# Patient Record
Sex: FEMALE | Race: WHITE | NOT HISPANIC OR LATINO | Employment: UNEMPLOYED | URBAN - METROPOLITAN AREA
[De-identification: names, ages, dates, MRNs, and addresses within clinical notes are randomized per-mention and may not be internally consistent; named-entity substitution may affect disease eponyms.]

---

## 2017-05-02 DIAGNOSIS — Z00.00 ROUTINE GENERAL MEDICAL EXAMINATION AT A HEALTH CARE FACILITY: Primary | ICD-10-CM

## 2017-05-08 DIAGNOSIS — Z00.00 ROUTINE GENERAL MEDICAL EXAMINATION AT A HEALTH CARE FACILITY: Primary | ICD-10-CM

## 2017-06-05 ENCOUNTER — HOSPITAL ENCOUNTER (OUTPATIENT)
Dept: RADIOLOGY | Facility: HOSPITAL | Age: 57
Discharge: HOME | End: 2017-06-05
Attending: INTERNAL MEDICINE
Payer: COMMERCIAL

## 2017-06-05 ENCOUNTER — OFFICE VISIT (OUTPATIENT)
Dept: INTERNAL MEDICINE | Facility: CLINIC | Age: 57
End: 2017-06-05
Payer: COMMERCIAL

## 2017-06-05 ENCOUNTER — CLINICAL SUPPORT (OUTPATIENT)
Dept: INTERNAL MEDICINE | Facility: CLINIC | Age: 57
End: 2017-06-05
Attending: INTERNAL MEDICINE
Payer: COMMERCIAL

## 2017-06-05 ENCOUNTER — HOSPITAL ENCOUNTER (OUTPATIENT)
Dept: CARDIOLOGY | Facility: CLINIC | Age: 57
Discharge: HOME | End: 2017-06-05
Payer: COMMERCIAL

## 2017-06-05 VITALS
HEIGHT: 63 IN | DIASTOLIC BLOOD PRESSURE: 75 MMHG | SYSTOLIC BLOOD PRESSURE: 120 MMHG | WEIGHT: 143.31 LBS | BODY MASS INDEX: 25.39 KG/M2

## 2017-06-05 DIAGNOSIS — Z00.00 ROUTINE GENERAL MEDICAL EXAMINATION AT A HEALTH CARE FACILITY: ICD-10-CM

## 2017-06-05 DIAGNOSIS — Z00.00 ANNUAL PHYSICAL EXAM: Primary | ICD-10-CM

## 2017-06-05 LAB — DIASTOLIC DYSFUNCTION: NO

## 2017-06-05 PROCEDURE — 99999 PR PBB SHADOW E&M-EST. PATIENT-LVL III: CPT | Mod: PBBFAC,,, | Performed by: INTERNAL MEDICINE

## 2017-06-05 PROCEDURE — 99396 PREV VISIT EST AGE 40-64: CPT | Mod: S$GLB,,, | Performed by: INTERNAL MEDICINE

## 2017-06-05 PROCEDURE — 71020 XR CHEST PA AND LATERAL: CPT | Mod: TC

## 2017-06-05 PROCEDURE — 71020 XR CHEST PA AND LATERAL: CPT | Mod: 26,,, | Performed by: RADIOLOGY

## 2017-06-05 PROCEDURE — 77067 SCR MAMMO BI INCL CAD: CPT | Mod: 26,,, | Performed by: RADIOLOGY

## 2017-06-05 PROCEDURE — 77067 SCR MAMMO BI INCL CAD: CPT | Mod: TC

## 2017-06-05 PROCEDURE — 93015 CV STRESS TEST SUPVJ I&R: CPT | Mod: S$GLB,,, | Performed by: INTERNAL MEDICINE

## 2017-06-05 RX ORDER — LEVOTHYROXINE SODIUM 137 UG/1
137 TABLET ORAL DAILY
Qty: 30 TABLET | Refills: 12
Start: 2017-06-05 | End: 2017-06-05

## 2017-06-05 RX ORDER — LEVOTHYROXINE SODIUM 125 UG/1
TABLET ORAL
Refills: 4 | COMMUNITY
Start: 2017-05-19 | End: 2020-07-06

## 2017-06-05 RX ORDER — VENLAFAXINE 50 MG/1
TABLET ORAL
Refills: 2 | COMMUNITY
Start: 2017-05-01 | End: 2023-10-02 | Stop reason: SDUPTHER

## 2017-06-05 RX ORDER — ERGOCALCIFEROL 1.25 MG/1
50000 CAPSULE ORAL
Refills: 2 | COMMUNITY
Start: 2017-04-15 | End: 2018-07-02

## 2017-06-05 NOTE — PROGRESS NOTES
Subjective:       Patient ID: Ranjana Kang is a 56 y.o. female.    Chief Complaint: Executive Health     PE    Recent sleeve surgery with 50 # weight loss.    Doing well, no complaints.  Labs done at [facility] a few months ago were acceptable with a cholesterol of 180, HDL 49, .  Glucose was 102.  Metabolic panel was acceptable.  A1c was 5.3.  TSH was suppressed at 0.04 with a T4 of 1.2; at that time, her thyroid was reduced to 125.  She will follow with her primary doctor soon regarding this.  CBC was acceptable.  Thiamine was 214.  Vitamin D was elevated at 98.  I have recommended that she reduce her vitamin D from 50,000 units weekly to 50,000 units monthly; she will discuss this with her PCP.    Patient Active Problem List:     Depression     Anxiety     Menopause syndrome     Glucose intolerance (impaired glucose tolerance)     Mixed hyperlipidemia     Acquired hypothyroidism     Family history of colon cancer: father in his 60s        Review of Systems   Constitutional: Negative for activity change, appetite change, chills, fatigue and fever.   HENT: Negative for sinus pressure and sore throat.    Eyes: Negative for visual disturbance.   Respiratory: Negative for cough, chest tightness, shortness of breath and wheezing.         No further KACY sx   Cardiovascular: Negative for chest pain, palpitations and leg swelling.   Gastrointestinal: Negative for abdominal distention, abdominal pain, anal bleeding, blood in stool, constipation, diarrhea, nausea and vomiting.   Endocrine: Negative for cold intolerance, heat intolerance, polydipsia, polyphagia and polyuria.        Thyroid overactive; meds adjusted now on 125   Genitourinary: Negative for dysuria, frequency, hematuria and vaginal bleeding.   Musculoskeletal: Negative for gait problem, joint swelling and myalgias.   Skin: Negative for rash.        Some hair loss   Neurological: Negative for dizziness, tremors, weakness, light-headedness and headaches.    Hematological: Negative for adenopathy. Does not bruise/bleed easily.   Psychiatric/Behavioral: Negative for confusion, hallucinations, sleep disturbance and suicidal ideas.       Objective:      Physical Exam   Constitutional: She is oriented to person, place, and time. She appears well-developed and well-nourished.   HENT:   Head: Normocephalic and atraumatic.   Right Ear: External ear normal.   Left Ear: External ear normal.   Nose: Nose normal.   Mouth/Throat: Oropharynx is clear and moist. No oropharyngeal exudate.   Eyes: Conjunctivae and EOM are normal. No scleral icterus.   Neck: Normal range of motion. Neck supple. No JVD present. No thyromegaly present.   Cardiovascular: Normal rate, regular rhythm, normal heart sounds and intact distal pulses.  Exam reveals no gallop.    No murmur heard.  Pulmonary/Chest: Effort normal and breath sounds normal. No respiratory distress. She has no wheezes. She exhibits no tenderness.   Abdominal: Soft. Bowel sounds are normal. She exhibits no distension and no mass. There is no tenderness. There is no rebound and no guarding.   Musculoskeletal: Normal range of motion. She exhibits no edema or tenderness.   Lymphadenopathy:     She has no cervical adenopathy.   Neurological: She is alert and oriented to person, place, and time. She displays normal reflexes. No cranial nerve deficit. Coordination normal.   Skin: Skin is warm. No rash noted. No erythema.   Psychiatric: She has a normal mood and affect. Her behavior is normal. Judgment and thought content normal.   Nursing note and vitals reviewed.      Assessment:       1. Annual physical exam    2. Hypothyroidism due to medication    3. Postmenopausal hormone replacement therapy        Plan:         Annual physical exam    Hypothyroidism due to medication  -     Continue on current regimen; will need repeat labs in the next 2-3 months; she will follow with her primary doctor regarding this    Menopause- off ERT    ETT  acceptable  Mammogram done today  Keep GYN follow up

## 2017-06-05 NOTE — LETTER
2017    Ranjana Kang  [address]  [place] [address]             [facility] - Internal Medicine  [address]  [facility] [address]  Phone: [phone]  Fax: [phone] Dear Mrs. Kang:    Thank you for allowing me to serve you and perform your Executive Health exam on 2017.  This letter will serve a brief summary of the history, physical findings, and laboratory/studies performed and recommendations at that time.    Reason for Visit: Executive Health Preventive Physical Examination    Past Medical History:   Diagnosis Date    Allergy     Anxiety     Depression     Family history of colon cancer: father in his 60s 2016    Glucose intolerance (impaired glucose tolerance) 2012    Headache     Hypothyroidism     Memory loss     Menopause syndrome     Mitral valve disorders 2012    Obesity     Other and unspecified hyperlipidemia     Sleep apnea: per sleep study 2015; CPAP at 11 cm recommended 2015    Special screening for malignant neoplasms, colon 2015    Thyroid disease        Past Surgical History:   Procedure Laterality Date    APPENDECTOMY  2015     SECTION, CLASSIC      FRACTURE SURGERY      HERNIA REPAIR      umbilical hernia    NOSE SURGERY         Family History   Problem Relation Age of Onset    Sleep apnea Mother     Cancer Mother      bladder sarcoma, smoking    Sleep apnea Father     Colon cancer Father     Depression Father      Bipolar    Cancer Father      colon    Depression Sister      suicide    No Known Problems Brother     No Known Problems Daughter     Melanoma Neg Hx     Skin cancer Neg Hx     Breast cancer Neg Hx     Ovarian cancer Neg Hx             Review of patient's allergies indicates:  No Known Allergies      Current Outpatient Prescriptions:     cetirizine (ZYRTEC) 10 mg Cap, Take by mouth., Disp: , Rfl:     sulfacetamide sodium-sulfur 10-5 % (w/w) Clsr, , Disp: , Rfl:  10    ergocalciferol (ERGOCALCIFEROL) 50,000 unit Cap, Take 50,000 Units by mouth every 30 days., Disp: , Rfl: 2    levothyroxine (SYNTHROID) 125 MCG tablet, TK 1 T PO QD, Disp: , Rfl: 4    lorazepam (ATIVAN) 0.5 MG tablet, , Disp: , Rfl: 2    nystatin-triamcinolone (MYCOLOG II) cream, APPLY TO THE AFFECTED AREA TWICE DAILY, Disp: 30 g, Rfl: 0    tretinoin (ALTRALIN) 0.05 % gel, APPLY TO FACE ONCE D QHS UTD, Disp: , Rfl: 10    venlafaxine (EFFEXOR) 50 MG Tab, TK 1 T PO  TID, Disp: , Rfl: 2     Review of Systems  Review of Systems - Negative except for some hair loss, possibly secondary to thyroid    Physical Exam:  General: General appearance: alert, well appearing, and in no distress.   Skin: Skin exam - normal coloration and turgor, no rashes, no suspicious skin lesions noted.  HEENT: Ears - bilateral TM's and external ear canals normal. , ENT exam reveals - ENT exam normal, no neck nodes or sinus tenderness.   Lungs: Chest: clear to auscultation, no wheezes, rales or rhonchi, symmetric air entry.   Heart: CVS exam: normal rate, regular rhythm, normal S1, S2, no murmurs, rubs, clicks or gallops.   Extremities: Exam of extremities: peripheral pulses normal, no pedal edema, no clubbing or cyanosis    Labs:  Results for orders placed or performed during the hospital encounter of 06/05/17   Cardiac treadmill stress test   Result Value Ref Range    Diastolic Dysfunction No         Assessment/Recommendations:  Routine Health Maintenance is up to date.  You should keep your follow-up with GYN within the next year.  You will be due for a colonoscopy next year.    At this time, you appear to be in good medical condition.   Your thyroid should be monitored at least every 3-4 months until levels normalize.  This could be responsible for some of the hair thinning.  Please let me know if your symptoms persist or worsen or do not improve after thyroid has normalized.      I look forward to seeing you again next year.   Please contact me should you have any questions or concerns regarding physical findings, or my recommendations.      Sincerely,            Malena Pinto MD, FACP

## 2018-06-05 DIAGNOSIS — Z12.11 SPECIAL SCREENING FOR MALIGNANT NEOPLASMS, COLON: ICD-10-CM

## 2018-06-05 DIAGNOSIS — Z00.00 ROUTINE GENERAL MEDICAL EXAMINATION AT A HEALTH CARE FACILITY: Primary | ICD-10-CM

## 2018-06-27 DIAGNOSIS — Z12.11 SPECIAL SCREENING FOR MALIGNANT NEOPLASMS, COLON: Primary | ICD-10-CM

## 2018-07-02 ENCOUNTER — HOSPITAL ENCOUNTER (OUTPATIENT)
Dept: RADIOLOGY | Facility: CLINIC | Age: 58
Discharge: HOME | End: 2018-07-02
Payer: COMMERCIAL

## 2018-07-02 ENCOUNTER — HOSPITAL ENCOUNTER (OUTPATIENT)
Dept: RADIOLOGY | Facility: HOSPITAL | Age: 58
Discharge: HOME | End: 2018-07-02
Attending: INTERNAL MEDICINE
Payer: COMMERCIAL

## 2018-07-02 ENCOUNTER — CLINICAL SUPPORT (OUTPATIENT)
Dept: INTERNAL MEDICINE | Facility: CLINIC | Age: 58
End: 2018-07-02
Payer: COMMERCIAL

## 2018-07-02 ENCOUNTER — OFFICE VISIT (OUTPATIENT)
Dept: INTERNAL MEDICINE | Facility: CLINIC | Age: 58
End: 2018-07-02
Payer: COMMERCIAL

## 2018-07-02 ENCOUNTER — HOSPITAL ENCOUNTER (OUTPATIENT)
Dept: CARDIOLOGY | Facility: CLINIC | Age: 58
Discharge: HOME | End: 2018-07-02
Payer: COMMERCIAL

## 2018-07-02 VITALS
HEIGHT: 63 IN | SYSTOLIC BLOOD PRESSURE: 98 MMHG | DIASTOLIC BLOOD PRESSURE: 60 MMHG | BODY MASS INDEX: 18.75 KG/M2 | WEIGHT: 105.81 LBS

## 2018-07-02 DIAGNOSIS — Z00.00 ANNUAL PHYSICAL EXAM: Primary | ICD-10-CM

## 2018-07-02 DIAGNOSIS — Z00.00 ROUTINE GENERAL MEDICAL EXAMINATION AT A HEALTH CARE FACILITY: Primary | ICD-10-CM

## 2018-07-02 DIAGNOSIS — Z00.00 ROUTINE GENERAL MEDICAL EXAMINATION AT A HEALTH CARE FACILITY: ICD-10-CM

## 2018-07-02 DIAGNOSIS — Z80.0 FAMILY HISTORY OF COLON CANCER: ICD-10-CM

## 2018-07-02 PROBLEM — Z98.84 HISTORY OF BARIATRIC SURGERY: Status: ACTIVE | Noted: 2018-07-02

## 2018-07-02 LAB
ALBUMIN SERPL BCP-MCNC: 3.9 G/DL
ALP SERPL-CCNC: 69 U/L
ALT SERPL W/O P-5'-P-CCNC: 43 U/L
ANION GAP SERPL CALC-SCNC: 9 MMOL/L
AST SERPL-CCNC: 35 U/L
BILIRUB SERPL-MCNC: 0.9 MG/DL
BUN SERPL-MCNC: 23 MG/DL
CALCIUM SERPL-MCNC: 9.9 MG/DL
CHLORIDE SERPL-SCNC: 107 MMOL/L
CHOLEST SERPL-MCNC: 182 MG/DL
CHOLEST/HDLC SERPL: 2.2 {RATIO}
CO2 SERPL-SCNC: 31 MMOL/L
CREAT SERPL-MCNC: 0.9 MG/DL
ERYTHROCYTE [DISTWIDTH] IN BLOOD BY AUTOMATED COUNT: 11.7 %
EST. GFR  (AFRICAN AMERICAN): >60 ML/MIN/1.73 M^2
EST. GFR  (NON AFRICAN AMERICAN): >60 ML/MIN/1.73 M^2
ESTIMATED AVG GLUCOSE: 100 MG/DL
GLUCOSE SERPL-MCNC: 103 MG/DL
HBA1C MFR BLD HPLC: 5.1 %
HCT VFR BLD AUTO: 44.3 %
HDLC SERPL-MCNC: 82 MG/DL
HDLC SERPL: 45.1 %
HGB BLD-MCNC: 14.6 G/DL
LDLC SERPL CALC-MCNC: 79 MG/DL
MCH RBC QN AUTO: 32.4 PG
MCHC RBC AUTO-ENTMCNC: 33 G/DL
MCV RBC AUTO: 98 FL
NONHDLC SERPL-MCNC: 100 MG/DL
PLATELET # BLD AUTO: 245 K/UL
PMV BLD AUTO: 10.2 FL
POTASSIUM SERPL-SCNC: 5 MMOL/L
PROT SERPL-MCNC: 6.4 G/DL
RBC # BLD AUTO: 4.5 M/UL
SODIUM SERPL-SCNC: 147 MMOL/L
TRIGL SERPL-MCNC: 105 MG/DL
TSH SERPL DL<=0.005 MIU/L-ACNC: 2.24 UIU/ML
WBC # BLD AUTO: 3.7 K/UL

## 2018-07-02 PROCEDURE — 77067 SCR MAMMO BI INCL CAD: CPT | Mod: 26,,, | Performed by: RADIOLOGY

## 2018-07-02 PROCEDURE — 77067 SCR MAMMO BI INCL CAD: CPT | Mod: TC

## 2018-07-02 PROCEDURE — 77063 BREAST TOMOSYNTHESIS BI: CPT | Mod: 26,,, | Performed by: RADIOLOGY

## 2018-07-02 PROCEDURE — 80053 COMPREHEN METABOLIC PANEL: CPT

## 2018-07-02 PROCEDURE — 77080 DXA BONE DENSITY AXIAL: CPT | Mod: 26,,, | Performed by: INTERNAL MEDICINE

## 2018-07-02 PROCEDURE — 77080 DXA BONE DENSITY AXIAL: CPT | Mod: TC

## 2018-07-02 PROCEDURE — 36415 COLL VENOUS BLD VENIPUNCTURE: CPT

## 2018-07-02 PROCEDURE — 84443 ASSAY THYROID STIM HORMONE: CPT

## 2018-07-02 PROCEDURE — 99999 PR PBB SHADOW E&M-EST. PATIENT-LVL III: CPT | Mod: PBBFAC,,, | Performed by: INTERNAL MEDICINE

## 2018-07-02 PROCEDURE — 80061 LIPID PANEL: CPT

## 2018-07-02 PROCEDURE — 99396 PREV VISIT EST AGE 40-64: CPT | Mod: S$GLB,,, | Performed by: INTERNAL MEDICINE

## 2018-07-02 PROCEDURE — 83036 HEMOGLOBIN GLYCOSYLATED A1C: CPT

## 2018-07-02 PROCEDURE — 93000 ELECTROCARDIOGRAM COMPLETE: CPT | Mod: S$GLB,,, | Performed by: INTERNAL MEDICINE

## 2018-07-02 PROCEDURE — 85027 COMPLETE CBC AUTOMATED: CPT

## 2018-07-02 NOTE — PATIENT INSTRUCTIONS
Prevention Guidelines, Women Ages 50 to 64  Screening tests and vaccines are an important part of managing your health. Health counseling is essential, too. Below are guidelines for these, for women ages 50 to 64. Talk with your healthcare provider to make sure youre up to date on what you need.  Screening Who needs it How often   Type 2 diabetes or prediabetes All adults beginning at age 45 and adults without symptoms at any age who are overweight or obese and have 1 or more additional risk factors for diabetes. At  least every 3 years   Alcohol misuse All women in this age group At routine exams   Blood pressure All women in this age group Every 2 years if your blood pressure is less than 120/80 mm Hg; yearly if your systolic blood pressure is 120 to 139 mm Hg, or your diastolic blood pressure reading is 80 to 89 mm Hg   Breast cancer All women in this age group Yearly mammogram and clinical breast exam1   Cervical cancer All women in this age group, except women who have had a complete hysterectomy Pap test every 3 years or Pap test with human papillomavirus (HPV) test every 5 years   Chlamydia Women at increased risk for infection At routine exams   Colorectal cancer All women in this age group Flexible sigmoidoscopy every 5 years, or colonoscopy every 10 years, or double-contrast barium enema every 5 years; yearly fecal occult blood test or fecal immunochemical test; or a stool DNA test as often as your health care provider advises; talk with your health care provider about which tests are best for you   Depression All women in this age group At routine exams   Gonorrhea Sexually active women at increased risk for infection At routine exams   Hepatitis C Anyone at increased risk; 1 time for those born between 1945 and 1965 At routine exams   High cholesterol or triglycerides All women in this age group who are at risk for coronary artery disease At least every 5 years   HIV All women At routine exams   Lung  cancer Adults age 55 to 80 who have smoked Yearly screening in smokers with 30 pack-year history of smoking or who quit within 15 years   Obesity All women in this age group At routine exams   Osteoporosis Women who are postmenopausal Ask your healthcare provider   Syphilis Women at increased risk for infection - talk with your healthcare provider At routine exams   Tuberculosis Women at increased risk for infection - talk with your healthcare provider Ask your healthcare provider   Vision All women in this age group Ask your healthcare provider   Vaccine Who needs it How often   Chickenpox (varicella) All women in this age group who have no record of this infection or vaccine 2 doses; the second dose should be given at least 4 weeks after the first dose   Hepatitis A Women at increased risk for infection - talk with your healthcare provider 2 doses given at least 6 months apart   Hepatitis B Women at increased risk for infection - talk with your healthcare provider 3 doses over 6 months; second dose should be given 1 month after the first dose; the third dose should be given at least 2 months after the second dose and at least 4 months after the first dose   Haemophilus influenzaeType B (HIB) Women at increased risk for infection - talk with your healthcare provider 1 to 3 doses   Influenza (flu) All women in this age group Once a year   Measles, mumps, rubella (MMR) Women in this age group through their late 50s who have no record of these infections or vaccines 1 dose   Meningococcal Women at increased risk for infection - talk with your healthcare provider 1 or more doses   Pneumococcal conjugate vaccine (PCV13) and pneumococcal polysaccharide vaccine (PPSV23) Women at increased risk for infection - talk with your healthcare provider PCV13: 1 dose ages 19 to 65 (protects against 13 types of pneumococcal bacteria)  PPSV23: 1 to 2 doses through age 64, or 1 dose at 65 or older (protects against 23 types of  pneumococcal bacteria)   Tetanus/diphtheria/pertussis (Td/Tdap) booster All women in this age group Td every 10 years, or a one-time dose of Tdap instead of a Td booster after age 18, then Td every 10 years   Zoster All women ages 60 and older 1 dose   Counseling Who needs it How often   BRCA gene mutation testing for breast and ovarian cancer susceptibility Women with increased risk for having gene mutation When your risk is known   Breast cancer and chemoprevention Women at high risk for breast cancer When your risk is known   Diet and exercise Women who are overweight or obese When diagnosed, and then at routine exams   Sexually transmitted infection prevention Women at increased risk for infection - talk with your healthcare provider At routine exams   Use of daily aspirin Women ages 55 and up in this age group who are at risk for cardiovascular health problems such as stroke When your risk is known   Use of tobacco and the health effects it can cause All women in this age group Every exam   1American Cancer Society  Date Last Reviewed: 1/26/2016  © 0459-2675 [employer]. [address] [place] [address], [place] [address]. All rights reserved. This information is not intended as a substitute for professional medical care. Always follow your healthcare professional's instructions.

## 2018-07-02 NOTE — LETTER
2018    Ranjana Dominguez  [address]  [place] [address]             [facility] - Internal Medicine  [address]  [facility] [address]  Phone: [phone]  Fax: [phone] Dear Mrs. Dominguez:    Thank you for allowing me to serve you and perform your Executive Health exam on 2018.  This letter will serve a brief summary of the history, physical findings, and laboratory/studies performed and recommendations at that time.    Reason for Visit: Executive Health Preventive Physical Examination    Past Medical History:   Diagnosis Date    Allergy     Anxiety     Depression     Family history of colon cancer: father in his 60s 2016    Glucose intolerance (impaired glucose tolerance) 2012    Headache(784.0)     History of bariatric surgery 2018    Hypothyroidism     Memory loss     Menopause syndrome     Mitral valve disorders(424.0) 2012    Obesity     Other and unspecified hyperlipidemia     Sleep apnea: per sleep study 2015; CPAP at 11 cm recommended 2015    Special screening for malignant neoplasms, colon 2015    Thyroid disease        Past Surgical History:   Procedure Laterality Date    APPENDECTOMY  2015     SECTION, CLASSIC      FRACTURE SURGERY      HERNIA REPAIR      umbilical hernia    NOSE SURGERY         Family History   Problem Relation Age of Onset    Sleep apnea Mother     Cancer Mother         bladder sarcoma, smoking    Sleep apnea Father     Colon cancer Father     Depression Father         Bipolar    Cancer Father         colon    Depression Sister         suicide    No Known Problems Brother     No Known Problems Daughter     Melanoma Neg Hx     Skin cancer Neg Hx     Breast cancer Neg Hx     Ovarian cancer Neg Hx             Review of patient's allergies indicates:   Allergen Reactions    Hydrocaine          Current Outpatient Prescriptions:     cetirizine (ZYRTEC) 10 mg Cap, Take  by mouth., Disp: , Rfl:     levothyroxine (SYNTHROID) 125 MCG tablet, TK 1 T PO QD, Disp: , Rfl: 4    lorazepam (ATIVAN) 0.5 MG tablet, , Disp: , Rfl: 2    nystatin-triamcinolone (MYCOLOG II) cream, APPLY TO THE AFFECTED AREA TWICE DAILY, Disp: 30 g, Rfl: 0    sulfacetamide sodium-sulfur 10-5 % (w/w) Clsr, , Disp: , Rfl: 10    tretinoin (ALTRALIN) 0.05 % gel, APPLY TO FACE ONCE D QHS UTD, Disp: , Rfl: 10    venlafaxine (EFFEXOR) 50 MG Tab, TK 1 T PO  TID, Disp: , Rfl: 2     Review of Systems  Review of Systems - Negative except for some additional weight loss and low blood pressure    Physical Exam:  General: General appearance: alert, well appearing, and in no distress.   Skin: Skin exam - normal coloration and turgor, no rashes, no suspicious skin lesions noted.  HEENT: Ears - bilateral TM's and external ear canals normal. , ENT exam reveals - ENT exam normal, no neck nodes or sinus tenderness.   Lungs: Chest: clear to auscultation, no wheezes, rales or rhonchi, symmetric air entry.   Heart: CVS exam: normal rate, regular rhythm, normal S1, S2, no murmurs, rubs, clicks or gallops.   Extremities: Exam of extremities: peripheral pulses normal, no pedal edema, no clubbing or cyanosis    Labs:  Results for orders placed or performed in visit on 07/02/18   Comprehensive metabolic panel   Result Value Ref Range    Sodium 147 (H) 136 - 145 mmol/L    Potassium 5.0 3.5 - 5.1 mmol/L    Chloride 107 95 - 110 mmol/L    CO2 31 (H) 23 - 29 mmol/L    Glucose 103 70 - 110 mg/dL    BUN, Bld 23 (H) 6 - 20 mg/dL    Creatinine 0.9 0.5 - 1.4 mg/dL    Calcium 9.9 8.7 - 10.5 mg/dL    Total Protein 6.4 6.0 - 8.4 g/dL    Albumin 3.9 3.5 - 5.2 g/dL    Total Bilirubin 0.9 0.1 - 1.0 mg/dL    Alkaline Phosphatase 69 55 - 135 U/L    AST 35 10 - 40 U/L    ALT 43 10 - 44 U/L    Anion Gap 9 8 - 16 mmol/L    eGFR if African American >60.0 >60 mL/min/1.73 m^2    eGFR if non African American >60.0 >60 mL/min/1.73 m^2   CBC Without Differential    Result Value Ref Range    WBC 3.70 (L) 3.90 - 12.70 K/uL    RBC 4.50 4.00 - 5.40 M/uL    Hemoglobin 14.6 12.0 - 16.0 g/dL    Hematocrit 44.3 37.0 - 48.5 %    MCV 98 82 - 98 fL    MCH 32.4 (H) 27.0 - 31.0 pg    MCHC 33.0 32.0 - 36.0 g/dL    RDW 11.7 11.5 - 14.5 %    Platelets 245 150 - 350 K/uL    MPV 10.2 9.2 - 12.9 fL   Lipid panel   Result Value Ref Range    Cholesterol 182 120 - 199 mg/dL    Triglycerides 105 30 - 150 mg/dL    HDL 82 (H) 40 - 75 mg/dL    LDL Cholesterol 79.0 63.0 - 159.0 mg/dL    HDL/Chol Ratio 45.1 20.0 - 50.0 %    Total Cholesterol/HDL Ratio 2.2 2.0 - 5.0    Non-HDL Cholesterol 100 mg/dL   TSH   Result Value Ref Range    TSH 2.236 0.400 - 4.000 uIU/mL   Hemoglobin A1c   Result Value Ref Range    Hemoglobin A1C 5.1 4.0 - 5.6 %    Estimated Avg Glucose 100 68 - 131 mg/dL        EKG and bone density acceptable.  Mammogram was likewise acceptable.    Assessment/Recommendations:  Routine Health Maintenance:  You will have a gyn exam later this year.  A colonoscopy has been ordered.    At this time, you appear to be in good medical condition.  However, your weight is low with a BMI of 18 and I recommend 10-15 lb of weight gain.  I suspect that your low blood pressure readings are related to her rapid weight loss.  Healthy fats such as avocado, nuts and small amounts of cheese and eggs would be beneficial.  If you are unable to gain weight, please let your bariatric surgeon know.  In addition, I recommend you discuss with your endocrinologist reducing her thyroid medication down from 125 to 100 or 112.  Even though your thyroid lab work is normal, this dose may be high for a woman of your size.    I look forward to seeing you again next year.  Please contact me should you have any questions or concerns regarding physical findings, or my recommendations.    When the new shingles vaccine is available next year, I would recommend that you take it- it is called Shingrix.    Sincerely,          Malena BYRD  MD Blair, FACP

## 2018-07-02 NOTE — PROGRESS NOTES
Subjective:       Patient ID: Ranjana Dominguez is a 58 y.o. female.    Chief Complaint: Executive Health    [facility]    Sees  at  for thyroid    Sometimes BP is low normal.    Weight loss, now BMI is 18.    Follows with bariatric physician (Dr. Kaur) as well as Psych MD.  Weaning Effexor from 150 to 100.    Due for gyn and colonoscopy.  Gyn has been scheduled.  Colonoscopy has been ordered but not scheduled, this was reiterated.    DEXA from today looks good.  EKG acceptable.  She will get mammogram today as well.    Patient Active Problem List:     Depression     Anxiety     Menopause syndrome     Glucose intolerance (impaired glucose tolerance)     Mixed hyperlipidemia     Acquired hypothyroidism     Family history of colon cancer: father in his 60s     History of bariatric surgery: sleeve 2017        Review of Systems   Constitutional: Negative for activity change, appetite change, chills, fatigue and fever.        Additional weight loss down to BMI 18   HENT: Negative for congestion, hearing loss, sinus pressure and sore throat.    Eyes: Negative for visual disturbance.   Respiratory: Negative for apnea, cough, shortness of breath and wheezing.    Cardiovascular: Negative for chest pain, palpitations and leg swelling.        Occasionally BP goes down, lightheaded- she had 1 fall when she got up out of bed rapidly and blood pressure dropped.   Gastrointestinal: Negative for abdominal distention, abdominal pain, constipation, diarrhea, nausea and vomiting.   Genitourinary: Negative for dysuria, frequency, hematuria and vaginal bleeding.   Musculoskeletal: Negative for gait problem, joint swelling and myalgias.   Skin: Negative for rash.   Neurological: Negative for dizziness, weakness, light-headedness and headaches.   Hematological: Negative for adenopathy. Does not bruise/bleed easily.   Psychiatric/Behavioral: Negative for confusion, hallucinations, sleep disturbance and suicidal ideas.        Objective:      Physical Exam   Constitutional: She is oriented to person, place, and time. She appears well-developed and well-nourished.   HENT:   Head: Normocephalic and atraumatic.   Right Ear: External ear normal.   Left Ear: External ear normal.   Nose: Nose normal.   Mouth/Throat: Oropharynx is clear and moist. No oropharyngeal exudate.   Eyes: Conjunctivae and EOM are normal. No scleral icterus.   Neck: Normal range of motion. Neck supple. No JVD present. No thyromegaly present.   Cardiovascular: Normal rate, regular rhythm, normal heart sounds and intact distal pulses.  Exam reveals no gallop.    No murmur heard.  Pulmonary/Chest: Effort normal and breath sounds normal. No respiratory distress. She has no wheezes.   Abdominal: Soft. Bowel sounds are normal. She exhibits no distension and no mass. There is no tenderness. There is no rebound and no guarding.   Musculoskeletal: Normal range of motion. She exhibits no edema or tenderness.   Lymphadenopathy:     She has no cervical adenopathy.   Neurological: She is alert and oriented to person, place, and time. She displays normal reflexes. No cranial nerve deficit. Coordination normal.   Skin: Skin is warm. No rash noted. No erythema.   Psychiatric: She has a normal mood and affect. Her behavior is normal. Judgment and thought content normal.   Nursing note and vitals reviewed.      Assessment:       1. Annual physical exam    2. Family history of colon cancer: father in his 60s        Plan:         Annual physical exam    Family history of colon cancer: father in his 60s  -     Case request GI: COLONOSCOPY     Labs reviewed, all acceptable   I told her that I think she should be on a lower dose of Synthroid, 125 is a big dose for a woman of her size, she will discuss with her endocrinologist.  Even the TSH is normal, think reducing to 112 or 100 would be better  I strongly encouraged her to gain 10 lb, BMI of 18 is too low  Hydration, healthy fat, salt,  monitor blood pressure  Colonoscopy this year  Shingles vaccine recommended, consider for 2019  Mammogram today  Gyn scheduled

## 2018-07-08 ENCOUNTER — PATIENT MESSAGE (OUTPATIENT)
Dept: INTERNAL MEDICINE | Facility: CLINIC | Age: 58
End: 2018-07-08

## 2018-07-09 DIAGNOSIS — Z80.0 FAMILY HISTORY OF COLON CANCER REQUIRING SCREENING COLONOSCOPY: Primary | ICD-10-CM

## 2018-07-09 RX ORDER — SODIUM, POTASSIUM,MAG SULFATES 17.5-3.13G
1 SOLUTION, RECONSTITUTED, ORAL ORAL DAILY
Qty: 1 KIT | Refills: 0 | Status: SHIPPED | OUTPATIENT
Start: 2018-07-09 | End: 2018-07-11

## 2018-08-13 ENCOUNTER — ANESTHESIA (OUTPATIENT)
Dept: ENDOSCOPY | Facility: HOSPITAL | Age: 58
End: 2018-08-13
Payer: COMMERCIAL

## 2018-08-13 ENCOUNTER — HOSPITAL ENCOUNTER (OUTPATIENT)
Facility: HOSPITAL | Age: 58
Discharge: HOME | End: 2018-08-13
Attending: COLON & RECTAL SURGERY | Admitting: COLON & RECTAL SURGERY
Payer: COMMERCIAL

## 2018-08-13 ENCOUNTER — ANESTHESIA EVENT (OUTPATIENT)
Dept: ENDOSCOPY | Facility: HOSPITAL | Age: 58
End: 2018-08-13
Payer: COMMERCIAL

## 2018-08-13 VITALS
BODY MASS INDEX: 18.07 KG/M2 | SYSTOLIC BLOOD PRESSURE: 119 MMHG | TEMPERATURE: 98 F | HEART RATE: 55 BPM | DIASTOLIC BLOOD PRESSURE: 75 MMHG | RESPIRATION RATE: 18 BRPM | OXYGEN SATURATION: 100 % | HEIGHT: 63 IN | WEIGHT: 102 LBS

## 2018-08-13 DIAGNOSIS — Z12.11 SPECIAL SCREENING FOR MALIGNANT NEOPLASMS, COLON: ICD-10-CM

## 2018-08-13 PROCEDURE — E9220 PRA ENDO ANESTHESIA: HCPCS | Mod: 33,,, | Performed by: NURSE ANESTHETIST, CERTIFIED REGISTERED

## 2018-08-13 PROCEDURE — 27201012 HC FORCEPS, HOT/COLD, DISP: Performed by: COLON & RECTAL SURGERY

## 2018-08-13 PROCEDURE — 88305 TISSUE EXAM BY PATHOLOGIST: CPT | Mod: 26,,, | Performed by: PATHOLOGY

## 2018-08-13 PROCEDURE — 88305 TISSUE EXAM BY PATHOLOGIST: CPT | Performed by: PATHOLOGY

## 2018-08-13 PROCEDURE — S5010 5% DEXTROSE AND 0.45% SALINE: HCPCS | Performed by: COLON & RECTAL SURGERY

## 2018-08-13 PROCEDURE — 37000008 HC ANESTHESIA 1ST 15 MINUTES: Performed by: COLON & RECTAL SURGERY

## 2018-08-13 PROCEDURE — 63600175 PHARM REV CODE 636 W HCPCS: Performed by: NURSE ANESTHETIST, CERTIFIED REGISTERED

## 2018-08-13 PROCEDURE — 37000009 HC ANESTHESIA EA ADD 15 MINS: Performed by: COLON & RECTAL SURGERY

## 2018-08-13 PROCEDURE — 45380 COLONOSCOPY AND BIOPSY: CPT | Mod: 33,,, | Performed by: COLON & RECTAL SURGERY

## 2018-08-13 PROCEDURE — 25000003 PHARM REV CODE 250: Performed by: COLON & RECTAL SURGERY

## 2018-08-13 PROCEDURE — 45380 COLONOSCOPY AND BIOPSY: CPT | Performed by: COLON & RECTAL SURGERY

## 2018-08-13 RX ORDER — DEXTROSE MONOHYDRATE AND SODIUM CHLORIDE 5; .45 G/100ML; G/100ML
INJECTION, SOLUTION INTRAVENOUS CONTINUOUS
Status: ACTIVE | OUTPATIENT
Start: 2018-08-13

## 2018-08-13 RX ORDER — LIDOCAINE HCL/PF 100 MG/5ML
SYRINGE (ML) INTRAVENOUS
Status: DISCONTINUED | OUTPATIENT
Start: 2018-08-13 | End: 2018-08-13

## 2018-08-13 RX ORDER — SODIUM CHLORIDE 0.9 % (FLUSH) 0.9 %
3 SYRINGE (ML) INJECTION
Status: ACTIVE | OUTPATIENT
Start: 2018-08-13

## 2018-08-13 RX ORDER — PROPOFOL 10 MG/ML
VIAL (ML) INTRAVENOUS
Status: DISCONTINUED | OUTPATIENT
Start: 2018-08-13 | End: 2018-08-13

## 2018-08-13 RX ORDER — PROPOFOL 10 MG/ML
VIAL (ML) INTRAVENOUS CONTINUOUS PRN
Status: DISCONTINUED | OUTPATIENT
Start: 2018-08-13 | End: 2018-08-13

## 2018-08-13 RX ADMIN — PROPOFOL 40 MG: 10 INJECTION, EMULSION INTRAVENOUS at 08:08

## 2018-08-13 RX ADMIN — LIDOCAINE HYDROCHLORIDE 100 MG: 20 INJECTION, SOLUTION INTRAVENOUS at 08:08

## 2018-08-13 RX ADMIN — PROPOFOL 20 MG: 10 INJECTION, EMULSION INTRAVENOUS at 09:08

## 2018-08-13 RX ADMIN — PROPOFOL 200 MCG/KG/MIN: 10 INJECTION, EMULSION INTRAVENOUS at 08:08

## 2018-08-13 RX ADMIN — PROPOFOL 20 MG: 10 INJECTION, EMULSION INTRAVENOUS at 08:08

## 2018-08-13 RX ADMIN — DEXTROSE MONOHYDRATE AND SODIUM CHLORIDE: 5; .45 INJECTION, SOLUTION INTRAVENOUS at 08:08

## 2018-08-13 RX ADMIN — DEXTROSE AND SODIUM CHLORIDE: 5; .45 INJECTION, SOLUTION INTRAVENOUS at 07:08

## 2018-08-13 NOTE — [employer]OVATION PATIENT INSTRUCTIONS
Discharge Summary/Instructions after an Endoscopic Procedure  Patient Name: Ranjana Dominguez  Patient MRN: [id number]  Patient YOB: 1960 Monday, August 13, 2018  Hansel Shell MD  RESTRICTIONS:  During your procedure today, you received medications for sedation.  These   medications may affect your judgment, balance and coordination.  Therefore,   for 24 hours, you have the following restrictions:   - DO NOT drive a car, operate machinery, make legal/financial decisions,   sign important papers or drink alcohol.    ACTIVITY:  Today: no heavy lifting, straining or running due to procedural   sedation/anesthesia.  The following day: return to full activity including work.  DIET:  Eat and drink normally unless instructed otherwise.     TREATMENT FOR COMMON SIDE EFFECTS:  - Mild abdominal pain, nausea, belching, bloating or excessive gas:  rest,   eat lightly and use a heating pad.  - Sore Throat: treat with throat lozenges and/or gargle with warm salt   water.  - Because air was used during the procedure, expelling large amounts of air   from your rectum or belching is normal.  - If a bowel prep was taken, you may not have a bowel movement for 1-3 days.    This is normal.  SYMPTOMS TO WATCH FOR AND REPORT TO YOUR PHYSICIAN:  1. Abdominal pain or bloating, other than gas cramps.  2. Chest pain.  3. Back pain.  4. Signs of infection such as: chills or fever occurring within 24 hours   after the procedure.  5. Rectal bleeding, which would show as bright red, maroon, or black stools.   (A tablespoon of blood from the rectum is not serious, especially if   hemorrhoids are present.)  6. Vomiting.  7. Weakness or dizziness.  GO DIRECTLY TO THE NEAREST EMERGENCY ROOM IF YOU HAVE ANY OF THE FOLLOWING:      Difficulty breathing              Chills and/or fever over 101 F   Persistent vomiting and/or vomiting blood   Severe abdominal pain   Severe chest pain   Black, tarry stools   Bleeding- more than one  tablespoon   Any other symptom or condition that you feel may need urgent attention  Your doctor recommends these additional instructions:  If any biopsies were taken, your doctors clinic will contact you in 1 to 2   weeks with any results.  - Discharge patient to home.   - Repeat colonoscopy in 3 years for surveillance.  For questions, problems or results please call your physician - Hansel Shell MD at Work:  [phone].  [facility], EMERGENCY ROOM PHONE NUMBER: [phone]  IF A COMPLICATION OR EMERGENCY SITUATION ARISES AND YOU ARE UNABLE TO REACH   YOUR PHYSICIAN - GO DIRECTLY TO THE EMERGENCY ROOM.  Hansel Shell MD  8/13/2018 9:16:27 AM  This report has been verified and signed electronically.  [employer]

## 2018-08-13 NOTE — ANESTHESIA RELEASE NOTE
"Anesthesia Release from PACU Note    Patient: Ranjana Dominguez    Procedure(s) Performed: Procedure(s) (LRB):  COLONOSCOPY (N/A)    Anesthesia type: general    Post pain: Adequate analgesia    Post assessment: no apparent anesthetic complications and tolerated procedure well    Last Vitals:   Visit Vitals  /75 (BP Location: Left arm, Patient Position: Lying)   Pulse (!) 55   Temp 36.4 °C (97.5 °F) (Temporal)   Resp 18   Ht 5' 3" (1.6 m)   Wt 46.3 kg (102 lb)   LMP 07/09/2004   SpO2 100%   Breastfeeding? No   BMI 18.07 kg/m²       Post vital signs: stable    Level of consciousness: awake and alert     Nausea/Vomiting: no nausea/no vomiting    Complications: none    Airway Patency: patent    Respiratory: unassisted, spontaneous ventilation, room air    Cardiovascular: stable and blood pressure at baseline    Hydration: euvolemic  "

## 2018-08-13 NOTE — PLAN OF CARE
Pt ambulated to Bed S. Arm band and allergy band applied. Plan of care initiated with patient. Pt verbalized understanding. Pt currently awaiting procedure. No c/o. No distress noted

## 2018-08-13 NOTE — TRANSFER OF CARE
"Anesthesia Transfer of Care Note    Patient: Ranjana Dominguez    Procedure(s) Performed: Procedure(s) (LRB):  COLONOSCOPY (N/A)    Patient location: GI    Anesthesia Type: general    Transport from OR: Transported from OR on room air with adequate spontaneous ventilation    Post pain: adequate analgesia    Post assessment: no apparent anesthetic complications    Post vital signs: stable    Level of consciousness: awake, alert and oriented    Nausea/Vomiting: no nausea/vomiting    Complications: none    Transfer of care protocol was followed      Last vitals:   Visit Vitals  /77 (BP Location: Left arm, Patient Position: Lying)   Pulse 72   Temp 36.7 °C (98.1 °F) (Temporal)   Resp 17   Ht 5' 3" (1.6 m)   Wt 46.3 kg (102 lb)   LMP 07/09/2004   SpO2 100%   Breastfeeding? No   BMI 18.07 kg/m²     "

## 2018-08-13 NOTE — DISCHARGE INSTRUCTIONS
Colonoscopy     A camera attached to a flexible tube with a viewing lens is used to take video pictures.     Colonoscopy is a test to view the inside of your lower digestive tract (colon and rectum). Sometimes it can show the last part of the small intestine (ileum). During the test, small pieces of tissue may be removed for testing. This is called a biopsy. Small growths, such as polyps, may also be removed.   Why is colonoscopy done?  The test is done to help look for colon cancer. And it can help find the source of abdominal pain, bleeding, and changes in bowel habits. It may be needed once a year, depending on factors such as your:  · Age  · Health history  · Family health history  · Symptoms  · Results from any prior colonoscopy  Risks and possible complications  These include:  · Bleeding               · A puncture or tear in the colon   · Risks of anesthesia  · A cancer lesion not being seen  Getting ready   To prepare for the test:  · Talk with your healthcare provider about the risks of the test (see below). Also ask your healthcare provider about alternatives to the test.  · Tell your healthcare provider about any medicines you take. Also tell him or her about any health conditions you may have.  · Make sure your rectum and colon are empty for the test. Follow the diet and bowel prep instructions exactly. If you dont, the test may need to be rescheduled.  · Plan for a friend or family member to drive you home after the test.     Colonoscopy provides an inside view of the entire colon.     You may discuss the results with your doctor right away or at a future visit.  During the test   The test is usually done in the hospital on an outpatient basis. This means you go home the same day. The procedure takes about 30 minutes. During that time:  · You are given relaxing (sedating) medicine through an IV line. You may be drowsy, or fully asleep.  · The healthcare provider will first give you a physical exam to  check for anal and rectal problems.  · Then the anus is lubricated and the scope inserted.  · If you are awake, you may have a feeling similar to needing to have a bowel movement. You may also feel pressure as air is pumped into the colon. Its OK to pass gas during the procedure.  · Biopsy, polyp removal, or other treatments may be done during the test.  After the test   You may have gas right after the test. It can help to try to pass it to help prevent later bloating. Your healthcare provider may discuss the results with you right away. Or you may need to schedule a follow-up visit to talk about the results. After the test, you can go back to your normal eating and other activities. You may be tired from the sedation and need to rest for a few hours.  Date Last Reviewed: 11/1/2016 © 2000-2017 [employer]. [address] [place] [address], [place] [address]. All rights reserved. This information is not intended as a substitute for professional medical care. Always follow your healthcare professional's instructions.

## 2018-08-13 NOTE — ANESTHESIA POSTPROCEDURE EVALUATION
"Anesthesia Post Evaluation    Patient: Ranjana Dominguez    Procedure(s) Performed: Procedure(s) (LRB):  COLONOSCOPY (N/A)    Final Anesthesia Type: general  Patient location during evaluation: GI PACU  Patient participation: Yes- Able to Participate  Level of consciousness: awake and alert  Post-procedure vital signs: reviewed and stable  Pain management: adequate  Airway patency: patent  PONV status at discharge: No PONV  Anesthetic complications: no      Cardiovascular status: hemodynamically stable  Respiratory status: unassisted, spontaneous ventilation and room air  Hydration status: euvolemic  Follow-up not needed.        Visit Vitals  /75 (BP Location: Left arm, Patient Position: Lying)   Pulse (!) 55   Temp 36.4 °C (97.5 °F) (Temporal)   Resp 18   Ht 5' 3" (1.6 m)   Wt 46.3 kg (102 lb)   LMP 07/09/2004   SpO2 100%   Breastfeeding? No   BMI 18.07 kg/m²       Pain/Alexsander Score: Pain Assessment Performed: Yes (8/13/2018  9:30 AM)  Presence of Pain: non-verbal indicators absent (8/13/2018  9:30 AM)  Alexsander Score: 10 (8/13/2018  9:30 AM)        "

## 2018-08-13 NOTE — ANESTHESIA PREPROCEDURE EVALUATION
2018  Ranjana Dominguez is a 58 y.o., female.  Past Medical History:   Diagnosis Date    Allergy     Anxiety     Depression     Family history of colon cancer: father in his 60s 2016    Glucose intolerance (impaired glucose tolerance) 2012    Headache(784.0)     History of bariatric surgery 2018    Hypothyroidism     Memory loss     Menopause syndrome     Mitral valve disorders(424.0) 2012    Obesity     Other and unspecified hyperlipidemia     Sleep apnea: per sleep study 2015; CPAP at 11 cm recommended 2015    Special screening for malignant neoplasms, colon 2015    Thyroid disease      Past Surgical History:   Procedure Laterality Date    APPENDECTOMY  2015     SECTION, CLASSIC      FRACTURE SURGERY      HERNIA REPAIR      umbilical hernia    NOSE SURGERY           Anesthesia Evaluation    I have reviewed the Patient Summary Reports.    I have reviewed the Nursing Notes.   I have reviewed the Medications.     Review of Systems  Anesthesia Hx:  No problems with previous Anesthesia  History of prior surgery of interest to airway management or planning: gastric bypass. Previous anesthesia: General Denies Family Hx of Anesthesia complications.   Denies Personal Hx of Anesthesia complications.   Social:  Non-Smoker    Cardiovascular:   Exercise tolerance: good Denies Hypertension.  Denies MI.  Denies CAD.    Low BP 2/2 effexor causing syncopal episodes,  titrating dose   Pulmonary:   Denies Sleep Apnea.    Renal/:  Renal/ Normal     Hepatic/GI:   H/o gastric bypass, doing well, denies GERD or abdominal pain   Endocrine:   Hypothyroidism        Physical Exam  General:  Well nourished    Airway/Jaw/Neck:  Airway Findings: Mouth Opening: Normal Tongue: Normal  General Airway Assessment: Adult  Mallampati: I  TM Distance: Normal, at  least 6 cm  Jaw/Neck Findings:  Neck ROM: Normal ROM      Dental:  Dental Findings: In tact        Mental Status:  Mental Status Findings:  Cooperative, Alert and Oriented         Anesthesia Plan  Type of Anesthesia, risks & benefits discussed:  Anesthesia Type:  general  Patient's Preference:   Intra-op Monitoring Plan:   Intra-op Monitoring Plan Comments:   Post Op Pain Control Plan:   Post Op Pain Control Plan Comments:   Induction:   IV  Beta Blocker:  Patient is not currently on a Beta-Blocker (No further documentation required).       Informed Consent: Patient understands risks and agrees with Anesthesia plan.  Questions answered. Anesthesia consent signed with patient.  ASA Score: 2     Day of Surgery Review of History & Physical:    H&P update referred to the surgeon.         Ready For Surgery From Anesthesia Perspective.

## 2018-08-13 NOTE — H&P
Endoscopy H&P    Procedure : Colonoscopy      family history of colon cancer (father)      Past Medical History:   Diagnosis Date    Allergy     Anxiety     Depression     Family history of colon cancer: father in his 60s 5/13/2016    Glucose intolerance (impaired glucose tolerance) 7/9/2012    Headache(784.0)     History of bariatric surgery 7/2/2018    Hypothyroidism     Memory loss     Menopause syndrome     Mitral valve disorders(424.0) 5/30/2012    Obesity     Other and unspecified hyperlipidemia     Sleep apnea: per sleep study June 2015; CPAP at 11 cm recommended 6/29/2015    Special screening for malignant neoplasms, colon 6/18/2015    Thyroid disease      Sedation Problems: NO  Family History   Problem Relation Age of Onset    Sleep apnea Mother     Cancer Mother         bladder sarcoma, smoking    Sleep apnea Father     Depression Father         Bipolar    Cancer Father         colon    Colon cancer Father     Depression Sister         suicide    No Known Problems Brother     No Known Problems Daughter     Melanoma Neg Hx     Skin cancer Neg Hx     Breast cancer Neg Hx     Ovarian cancer Neg Hx      Fam Hx of Sedation Problems: NO  Social History     Socioeconomic History    Marital status:      Spouse name: Not on file    Number of children: Not on file    Years of education: Not on file    Highest education level: Not on file   Social Needs    Financial resource strain: Not on file    Food insecurity - worry: Not on file    Food insecurity - inability: Not on file    Transportation needs - medical: Not on file    Transportation needs - non-medical: Not on file   Occupational History    Occupation: [employer]   Tobacco Use    Smoking status: Former Smoker     Packs/day: 0.50     Years: 7.00     Pack years: 3.50     Types: Cigarettes     Last attempt to quit: 11/20/1982      Years since quittin.7    Smokeless tobacco: Never Used   Substance and Sexual Activity    Alcohol use: Yes     Alcohol/week: 0.0 oz     Types: 2 - 4 Glasses of wine per week     Frequency: Never     Comment: weekends    Drug use: No    Sexual activity: Yes     Partners: Male   Other Topics Concern    Are you pregnant or think you may be? No    Breast-feeding No   Social History Narrative    Not on file       Review of Systems - Negative    Respiratory ROS: no cough, shortness of breath, or wheezing  Cardiovascular ROS: no chest pain or dyspnea on exertion  Gastrointestinal ROS: no abdominal pain, change in bowel habits, or black or bloody stools  Musculoskeletal ROS: negative  Neurological ROS: no TIA or stroke symptoms        Physical Exam:  General: no distress  Head: normocephalic  Airway:  normal oropharynx, airway normal  Neck: supple, symmetrical, trachea midline  Lungs:  clear to auscultation bilaterally and normal respiratory effort  Heart: regular rate and rhythm, S1, S2 normal, no murmur, rub or gallop  Abdomen: soft, non-tender non-distented; bowel sounds normal; no masses,  no organomegaly  Extremities: no cyanosis or edema, or clubbing       Deep Sedation: Mallampati Score per anesthesia     SedationPlan :Gen     ASA : II

## 2018-08-14 ENCOUNTER — PATIENT MESSAGE (OUTPATIENT)
Dept: INTERNAL MEDICINE | Facility: CLINIC | Age: 58
End: 2018-08-14

## 2018-08-14 PROBLEM — K63.5 COLON POLYP, HYPERPLASTIC: Status: ACTIVE | Noted: 2018-08-14

## 2018-08-20 ENCOUNTER — TELEPHONE (OUTPATIENT)
Dept: INTERNAL MEDICINE | Facility: CLINIC | Age: 58
End: 2018-08-20

## 2018-08-20 ENCOUNTER — PATIENT MESSAGE (OUTPATIENT)
Dept: INTERNAL MEDICINE | Facility: CLINIC | Age: 58
End: 2018-08-20

## 2018-08-20 NOTE — LETTER
August 31, 2018    Ranjana Dominguez  [address]  [place] [address]             [facility]  [address]  [facility] [address]  Phone: [phone]  Fax: [phone] Dear Mrs. Dominguez:    Your health and well-being are very important to us.  You are due for your Pap smear at this time.  Please reschedule your GYN appointment at your earliest convenience.  You can call gyn at [phone] to schedule this.  If you need any assistance with this appointment, please do not hesitate to respond to this letter, or to the [facility] message that contains this same information, or call my office at [phone].        Sincerely,        Malena Pinto MD

## 2018-08-21 ENCOUNTER — TELEPHONE (OUTPATIENT)
Dept: ENDOSCOPY | Facility: HOSPITAL | Age: 58
End: 2018-08-21

## 2018-08-23 ENCOUNTER — TELEPHONE (OUTPATIENT)
Dept: SURGERY | Facility: CLINIC | Age: 58
End: 2018-08-23

## 2018-08-23 NOTE — TELEPHONE ENCOUNTER
----- Message from Hansel Shell MD sent at 8/20/2018  7:52 AM CDT -----  Hyperpalstic polyp  recommend scope 5 years due to family hsitory

## 2018-08-24 ENCOUNTER — TELEPHONE (OUTPATIENT)
Dept: SURGERY | Facility: CLINIC | Age: 58
End: 2018-08-24

## 2018-08-24 NOTE — TELEPHONE ENCOUNTER
Left message for patient to follow up in 3 years for a colonoscopy and disregard the 5 year follow up letter that was sent inadvertently.

## 2018-09-25 ENCOUNTER — OFFICE VISIT (OUTPATIENT)
Dept: URGENT CARE | Facility: CLINIC | Age: 58
End: 2018-09-25
Payer: COMMERCIAL

## 2018-09-25 VITALS
HEART RATE: 60 BPM | DIASTOLIC BLOOD PRESSURE: 86 MMHG | WEIGHT: 102 LBS | BODY MASS INDEX: 18.07 KG/M2 | OXYGEN SATURATION: 100 % | TEMPERATURE: 98 F | SYSTOLIC BLOOD PRESSURE: 133 MMHG | HEIGHT: 63 IN

## 2018-09-25 DIAGNOSIS — R52 BODY ACHES: ICD-10-CM

## 2018-09-25 DIAGNOSIS — B34.9 VIRAL SYNDROME: Primary | ICD-10-CM

## 2018-09-25 DIAGNOSIS — R09.81 SINUS CONGESTION: ICD-10-CM

## 2018-09-25 PROCEDURE — 96372 THER/PROPH/DIAG INJ SC/IM: CPT | Mod: S$GLB,,, | Performed by: FAMILY MEDICINE

## 2018-09-25 PROCEDURE — 99214 OFFICE O/P EST MOD 30 MIN: CPT | Mod: 25,S$GLB,, | Performed by: FAMILY MEDICINE

## 2018-09-25 RX ORDER — BETAMETHASONE SODIUM PHOSPHATE AND BETAMETHASONE ACETATE 3; 3 MG/ML; MG/ML
9 INJECTION, SUSPENSION INTRA-ARTICULAR; INTRALESIONAL; INTRAMUSCULAR; SOFT TISSUE
Status: COMPLETED | OUTPATIENT
Start: 2018-09-25 | End: 2018-09-25

## 2018-09-25 RX ORDER — FLUTICASONE PROPIONATE 50 MCG
2 SPRAY, SUSPENSION (ML) NASAL DAILY
Qty: 1 BOTTLE | Refills: 0 | Status: SHIPPED | OUTPATIENT
Start: 2018-09-25 | End: 2018-09-25 | Stop reason: SDUPTHER

## 2018-09-25 RX ORDER — IBUPROFEN 800 MG/1
800 TABLET ORAL 3 TIMES DAILY
Qty: 30 TABLET | Refills: 0 | Status: SHIPPED | OUTPATIENT
Start: 2018-09-25 | End: 2018-10-05

## 2018-09-25 RX ADMIN — BETAMETHASONE SODIUM PHOSPHATE AND BETAMETHASONE ACETATE 9 MG: 3; 3 INJECTION, SUSPENSION INTRA-ARTICULAR; INTRALESIONAL; INTRAMUSCULAR; SOFT TISSUE at 05:09

## 2018-09-25 NOTE — PROGRESS NOTES
"Subjective:       Patient ID: Ranjana Dominguez is a 58 y.o. female.    Vitals:  height is 5' 3" (1.6 m) and weight is 46.3 kg (102 lb). Her oral temperature is 97.8 °F (36.6 °C). Her blood pressure is 133/86 and her pulse is 60. Her oxygen saturation is 100%.     Chief Complaint: Sinus Problem    Pt doesn't like taking decongestants bc of the stimulant feeling will do congestants.       Sinus Problem   This is a new problem. The current episode started in the past 7 days. The problem has been gradually worsening since onset. Her pain is at a severity of 2/10. The pain is mild. Associated symptoms include chills, coughing, headaches, sinus pressure, a sore throat and swollen glands. Pertinent negatives include no shortness of breath. Past treatments include acetaminophen. The treatment provided mild relief.     Review of Systems   Constitution: Positive for chills and weakness. Negative for fever.   HENT: Positive for sinus pressure and sore throat.    Eyes: Negative for blurred vision.   Cardiovascular: Negative for chest pain.   Respiratory: Positive for cough. Negative for shortness of breath.    Skin: Negative for rash.   Musculoskeletal: Negative for back pain and joint pain.   Gastrointestinal: Negative for abdominal pain, diarrhea, nausea and vomiting.   Neurological: Positive for headaches.   Psychiatric/Behavioral: The patient is not nervous/anxious.        Objective:      Physical Exam   Constitutional: She is oriented to person, place, and time. She appears well-developed and well-nourished.   HENT:   Head: Normocephalic and atraumatic.   Right Ear: External ear normal.   Left Ear: External ear normal.   Mouth/Throat: Oropharynx is clear and moist.   Eyes: EOM are normal. Pupils are equal, round, and reactive to light.   Neck: Normal range of motion. Neck supple. No JVD present. No tracheal deviation present. No thyromegaly present.   Cardiovascular: Normal rate, regular rhythm and normal heart " sounds. Exam reveals no gallop and no friction rub.   No murmur heard.  Pulmonary/Chest: Breath sounds normal. No respiratory distress. She has no wheezes. She has no rales. She exhibits no tenderness.   Abdominal: Soft. Bowel sounds are normal. She exhibits no distension and no mass. There is no tenderness. There is no rebound and no guarding. No hernia.   Musculoskeletal: Normal range of motion. She exhibits no edema, tenderness or deformity.   Lymphadenopathy:     She has no cervical adenopathy.   Neurological: She is alert and oriented to person, place, and time. She displays normal reflexes. No cranial nerve deficit. She exhibits normal muscle tone. Coordination normal.   Skin: Skin is warm. Capillary refill takes less than 2 seconds. No rash noted. No erythema. No pallor.   Psychiatric: She has a normal mood and affect. Her behavior is normal. Judgment and thought content normal.   Vitals reviewed.      Assessment:       1. Viral syndrome    2. Sinus congestion    3. Body aches        Plan:         Viral syndrome  -     ibuprofen (ADVIL,MOTRIN) 800 MG tablet; Take 1 tablet (800 mg total) by mouth 3 (three) times daily. for 10 days  Dispense: 30 tablet; Refill: 0    Sinus congestion  -     betamethasone acetate-betamethasone sodium phosphate injection 9 mg; Inject 1.5 mLs (9 mg total) into the muscle one time.  -     fluticasone (FLONASE) 50 mcg/actuation nasal spray; 2 sprays (100 mcg total) by Each Nare route once daily.  Dispense: 1 Bottle; Refill: 0    Body aches  -     betamethasone acetate-betamethasone sodium phosphate injection 9 mg; Inject 1.5 mLs (9 mg total) into the muscle one time.  -     ibuprofen (ADVIL,MOTRIN) 800 MG tablet; Take 1 tablet (800 mg total) by mouth 3 (three) times daily. for 10 days  Dispense: 30 tablet; Refill: 0          Patient Instructions     Viral Syndrome (Adult)  A viral illness may cause a number of symptoms. The symptoms depend on the part of the body that the virus  "affects. If it settles in your nose, throat, and lungs, it may cause cough, sore throat, congestion, and sometimes headache. If it settles in your stomach and intestinal tract, it may cause vomiting and diarrhea. Sometimes it causes vague symptoms like "aching all over," feeling tired, loss of appetite, or fever.  A viral illness usually lasts 1 to 2 weeks, but sometimes it lasts longer. In some cases, a more serious infection can look like a viral syndrome in the first few days of the illness. You may need another exam and additional tests to know the difference. Watch for the warning signs listed below.  Home care  Follow these guidelines for taking care of yourself at home:  · If symptoms are severe, rest at home for the first 2 to 3 days.  · Stay away from cigarette smoke - both your smoke and the smoke from others.  · You may use over-the-counter acetaminophen or ibuprofen for fever, muscle aching, and headache, unless another medicine was prescribed for this. If you have chronic liver or kidney disease or ever had a stomach ulcer or GI bleeding, talk with your doctor before using these medicines. No one who is younger than 18 and ill with a fever should take aspirin. It may cause severe disease or death.  · Your appetite may be poor, so a light diet is fine. Avoid dehydration by drinking 8 to 12 8-ounce glasses of fluids each day. This may include water; orange juice; lemonade; apple, grape, and cranberry juice; clear fruit drinks; electrolyte replacement and sports drinks; and decaffeinated teas and coffee. If you have been diagnosed with a kidney disease, ask your doctor how much and what types of fluids you should drink to prevent dehydration. If you have kidney disease, drinking too much fluid can cause it build up in the your body and be dangerous to your health.  · Over-the-counter remedies won't shorten the length of the illness but may be helpful for cough, sore throat; and nasal and sinus congestion. " Don't use decongestants if you have high blood pressure.  Follow-up care  Follow up with your healthcare provider if you do not improve over the next week.  Call 911  Get emergency medical care if any of the following occur:  · Convulsion  · Feeling weak, dizzy, or like you are going to faint  · Chest pain, shortness of breath, wheezing, or difficulty breathing  When to seek medical advice  Call your healthcare provider right away if any of these occur:  · Cough with lots of colored sputum (mucus) or blood in your sputum  · Chest pain, shortness of breath, wheezing, or difficulty breathing  · Severe headache; face, neck, or ear pain  · Severe, constant pain in the lower right side of your belly (abdominal)  · Continued vomiting (cant keep liquids down)  · Frequent diarrhea (more than 5 times a day); blood (red or black color) or mucus in diarrhea  · Feeling weak, dizzy, or like you are going to faint  · Extreme thirst  · Fever of 100.4°F (38°C) or higher, or as directed by your healthcare provider  Date Last Reviewed: 9/25/2015  © 7877-5471 [email or web address]. [address] [place] [address]. All rights reserved. This information is not intended as a substitute for professional medical care. Always follow your healthcare professional's instructions.      Follow up with your doctor in a few days as needed.  Return to the urgent care or go to the ER if symptoms get worse.    Prabhu Farris MD

## 2018-09-25 NOTE — PATIENT INSTRUCTIONS
"  Viral Syndrome (Adult)  A viral illness may cause a number of symptoms. The symptoms depend on the part of the body that the virus affects. If it settles in your nose, throat, and lungs, it may cause cough, sore throat, congestion, and sometimes headache. If it settles in your stomach and intestinal tract, it may cause vomiting and diarrhea. Sometimes it causes vague symptoms like "aching all over," feeling tired, loss of appetite, or fever.  A viral illness usually lasts 1 to 2 weeks, but sometimes it lasts longer. In some cases, a more serious infection can look like a viral syndrome in the first few days of the illness. You may need another exam and additional tests to know the difference. Watch for the warning signs listed below.  Home care  Follow these guidelines for taking care of yourself at home:  · If symptoms are severe, rest at home for the first 2 to 3 days.  · Stay away from cigarette smoke - both your smoke and the smoke from others.  · You may use over-the-counter acetaminophen or ibuprofen for fever, muscle aching, and headache, unless another medicine was prescribed for this. If you have chronic liver or kidney disease or ever had a stomach ulcer or GI bleeding, talk with your doctor before using these medicines. No one who is younger than 18 and ill with a fever should take aspirin. It may cause severe disease or death.  · Your appetite may be poor, so a light diet is fine. Avoid dehydration by drinking 8 to 12 8-ounce glasses of fluids each day. This may include water; orange juice; lemonade; apple, grape, and cranberry juice; clear fruit drinks; electrolyte replacement and sports drinks; and decaffeinated teas and coffee. If you have been diagnosed with a kidney disease, ask your doctor how much and what types of fluids you should drink to prevent dehydration. If you have kidney disease, drinking too much fluid can cause it build up in the your body and be dangerous to your " health.  · Over-the-counter remedies won't shorten the length of the illness but may be helpful for cough, sore throat; and nasal and sinus congestion. Don't use decongestants if you have high blood pressure.  Follow-up care  Follow up with your healthcare provider if you do not improve over the next week.  Call 911  Get emergency medical care if any of the following occur:  · Convulsion  · Feeling weak, dizzy, or like you are going to faint  · Chest pain, shortness of breath, wheezing, or difficulty breathing  When to seek medical advice  Call your healthcare provider right away if any of these occur:  · Cough with lots of colored sputum (mucus) or blood in your sputum  · Chest pain, shortness of breath, wheezing, or difficulty breathing  · Severe headache; face, neck, or ear pain  · Severe, constant pain in the lower right side of your belly (abdominal)  · Continued vomiting (cant keep liquids down)  · Frequent diarrhea (more than 5 times a day); blood (red or black color) or mucus in diarrhea  · Feeling weak, dizzy, or like you are going to faint  · Extreme thirst  · Fever of 100.4°F (38°C) or higher, or as directed by your healthcare provider  Date Last Reviewed: 9/25/2015  © 6122-0546 [employer]. [address] [place] [address], [place] [address]. All rights reserved. This information is not intended as a substitute for professional medical care. Always follow your healthcare professional's instructions.      Follow up with your doctor in a few days as needed.  Return to the urgent care or go to the ER if symptoms get worse.    Prabhu Farris MD

## 2018-09-28 RX ORDER — FLUTICASONE PROPIONATE 50 MCG
SPRAY, SUSPENSION (ML) NASAL
Qty: 48 ML | Refills: 0 | Status: SHIPPED | OUTPATIENT
Start: 2018-09-28 | End: 2024-01-09

## 2018-10-12 ENCOUNTER — CLINICAL SUPPORT (OUTPATIENT)
Dept: URGENT CARE | Facility: CLINIC | Age: 58
End: 2018-10-12
Payer: COMMERCIAL

## 2018-10-12 PROCEDURE — 90471 IMMUNIZATION ADMIN: CPT | Mod: S$GLB,,, | Performed by: EMERGENCY MEDICINE

## 2018-10-12 PROCEDURE — 90686 IIV4 VACC NO PRSV 0.5 ML IM: CPT | Mod: S$GLB,,, | Performed by: EMERGENCY MEDICINE

## 2018-12-11 ENCOUNTER — OFFICE VISIT (OUTPATIENT)
Dept: OBSTETRICS AND GYNECOLOGY | Facility: CLINIC | Age: 58
End: 2018-12-11
Payer: COMMERCIAL

## 2018-12-11 ENCOUNTER — TELEPHONE (OUTPATIENT)
Dept: OBSTETRICS AND GYNECOLOGY | Facility: CLINIC | Age: 58
End: 2018-12-11

## 2018-12-11 VITALS
WEIGHT: 110.63 LBS | BODY MASS INDEX: 19.6 KG/M2 | DIASTOLIC BLOOD PRESSURE: 76 MMHG | HEIGHT: 63 IN | SYSTOLIC BLOOD PRESSURE: 126 MMHG

## 2018-12-11 DIAGNOSIS — N95.2 VAGINAL ATROPHY: ICD-10-CM

## 2018-12-11 DIAGNOSIS — Z78.0 POSTMENOPAUSE: ICD-10-CM

## 2018-12-11 DIAGNOSIS — Z01.419 WELL WOMAN EXAM WITH ROUTINE GYNECOLOGICAL EXAM: Primary | ICD-10-CM

## 2018-12-11 PROCEDURE — 99396 PREV VISIT EST AGE 40-64: CPT | Mod: S$GLB,,, | Performed by: NURSE PRACTITIONER

## 2018-12-11 PROCEDURE — 99999 PR PBB SHADOW E&M-EST. PATIENT-LVL III: CPT | Mod: PBBFAC,,, | Performed by: NURSE PRACTITIONER

## 2018-12-11 PROCEDURE — 88175 CYTOPATH C/V AUTO FLUID REDO: CPT

## 2018-12-11 RX ORDER — VENLAFAXINE 25 MG/1
TABLET ORAL
Refills: 4 | COMMUNITY
Start: 2018-12-03 | End: 2021-07-30

## 2018-12-11 RX ORDER — LORAZEPAM 1 MG/1
TABLET ORAL
Refills: 0 | COMMUNITY
Start: 2018-09-24 | End: 2019-08-15 | Stop reason: SDUPTHER

## 2018-12-11 RX ORDER — ESTRADIOL 10 UG/1
INSERT VAGINAL
Qty: 24 TABLET | Refills: 4 | Status: ON HOLD | OUTPATIENT
Start: 2018-12-11 | End: 2021-11-17

## 2018-12-11 NOTE — PROGRESS NOTES
HISTORY OF PRESENT ILLNESS:    Ranjana Dominguez is a 58 y.o. female , presents for an [facility] routine exam and c/o vaginal dryness.  -Not taking oral HRT.   -Has lost weight due to weight loss surgery and exercise.    Past Medical History:   Diagnosis Date    Allergy     Anxiety     Colon polyp, hyperplastic: see colonoscopy 2018    Depression     Family history of colon cancer: father in his 60s 2016    Glucose intolerance (impaired glucose tolerance) 2012    Headache(784.0)     History of bariatric surgery 2018    Hypothyroidism     Memory loss     Menopause syndrome     Mitral valve disorders(424.0) 2012    Obesity     Other and unspecified hyperlipidemia     Sleep apnea: per sleep study 2015; CPAP at 11 cm recommended 2015    Special screening for malignant neoplasms, colon 2015    Thyroid disease        Past Surgical History:   Procedure Laterality Date    APPENDECTOMY  2015     SECTION, CLASSIC      COLONOSCOPY N/A 2018    Procedure: COLONOSCOPY;  Surgeon: Hansel Shell MD;  Location: [facility] ([address] [place] [address]);  Service: Endoscopy;  Laterality: N/A;    COLONOSCOPY N/A 2018    Performed by Hansel Shell MD at [facility] (4TH FLR)    COLONOSCOPY N/A 2015    Performed by Hansel Shell MD at [facility] (4TH FLR)    FRACTURE SURGERY      HERNIA REPAIR      umbilical hernia    NOSE SURGERY          MEDICATIONS AND ALLERGIES:      Current Outpatient Medications:     cetirizine (ZYRTEC) 10 mg Cap, Take by mouth., Disp: , Rfl:     fluticasone (FLONASE) 50 mcg/actuation nasal spray, SHAKE LIQUID AND USE 2 SPRAYS(100 MCG) IN EACH NOSTRIL EVERY DAY, Disp: 48 mL, Rfl: 0    levothyroxine (SYNTHROID) 125 MCG tablet, TK 1 T PO QD, Disp: , Rfl: 4    lorazepam (ATIVAN) 0.5 MG tablet, , Disp: , Rfl: 2    LORazepam (ATIVAN) 1 MG tablet, , Disp: , Rfl: 0    sulfacetamide sodium-sulfur 10-5 % (w/w) Clsr,  , Disp: , Rfl: 10    venlafaxine (EFFEXOR) 25 MG Tab, TK 1 T PO TID, Disp: , Rfl: 4    venlafaxine (EFFEXOR) 50 MG Tab, TK 1 T PO  TID, Disp: , Rfl: 2    estradiol (VAGIFEM) 10 mcg Tab, Insert one tablet vaginally twice weekly H.S, Disp: 24 tablet, Rfl: 4  No current facility-administered medications for this visit.     Facility-Administered Medications Ordered in Other Visits:     dextrose 5 % and 0.45 % NaCl infusion, , Intravenous, Continuous, Hansel Shell MD    dextrose 5 % and 0.45 % NaCl infusion, , Intravenous, Continuous, Hansel Shell MD, Last Rate: 20 mL/hr at 08/13/18 0755    sodium chloride 0.9% flush 3 mL, 3 mL, Intravenous, PRN, Hansel Shell MD    Review of patient's allergies indicates:   Allergen Reactions    Hydrocaine     Hydrocodone Itching       Family History   Problem Relation Age of Onset    Sleep apnea Mother     Cancer Mother         bladder sarcoma, smoking    Sleep apnea Father     Depression Father         Bipolar    Cancer Father         colon    Colon cancer Father     Depression Sister         suicide    No Known Problems Brother     No Known Problems Daughter     Melanoma Neg Hx     Skin cancer Neg Hx     Breast cancer Neg Hx     Ovarian cancer Neg Hx        Social History     Socioeconomic History    Marital status:      Spouse name: Not on file    Number of children: Not on file    Years of education: Not on file    Highest education level: Not on file   Social Needs    Financial resource strain: Not on file    Food insecurity - worry: Not on file    Food insecurity - inability: Not on file    Transportation needs - medical: Not on file    Transportation needs - non-medical: Not on file   Occupational History    Occupation: [employer]   Tobacco Use    Smoking status: Former Smoker     Packs/day: 0.50     Years: 7.00     Pack years: 3.50     Types: Cigarettes     Last attempt to quit: 11/20/1982     Years since  "quittin.0    Smokeless tobacco: Never Used   Substance and Sexual Activity    Alcohol use: Yes     Alcohol/week: 0.0 oz     Types: 2 - 4 Glasses of wine per week     Frequency: Never     Comment: weekends    Drug use: No    Sexual activity: Yes     Partners: Male   Other Topics Concern    Are you pregnant or think you may be? No    Breast-feeding No   Social History Narrative    Not on file       OB HISTORY: Number of vaginal deliveries: 1     COMPREHENSIVE GYN HISTORY:  PAP History: Denies abnormal Paps. LAST PAP 4-21-15 NORMAL.   Infection History: Denies STDs. Denies PID.  Benign History: Denies uterine fibroids. Denies ovarian cysts. Denies endometriosis. Denies other conditions.  Cancer History: Denies cervical cancer. Denies uterine cancer or hyperplasia. Denies ovarian cancer. Denies vulvar cancer or pre-cancer. Denies vaginal cancer or pre-cancer. Denies breast cancer. Denies colon cancer.  Sexual Activity History: Reports currently being sexually active  Menstrual History: Denies menses. Pt is : 2004. On HRT    ROS:  GENERAL: + INTENTIONAL WT LOSS. No swelling. No fatigue. No fever. No vasomotor symptoms.  CARDIOVASCULAR: No chest pain. No shortness of breath. No leg cramps.   NEUROLOGICAL: No headaches. No vision changes.  BREASTS: No pain. No lumps. No discharge.  ABDOMEN: No pain. No nausea. No vomiting. No diarrhea. No constipation.  REPRODUCTIVE: No abnormal bleeding.   VULVA: No pain. No lesions. No itching.  VAGINA: No relaxation. No itching. No odor. No discharge. No lesions. + DRYNESS.  URINARY: No incontinence. No nocturia. No frequency. No dysuria.    /76   Ht 5' 3" (1.6 m)   Wt 50.2 kg (110 lb 9.6 oz)   LMP 2004   BMI 19.59 kg/m²   6-7-16  lb    PE:  APPEARANCE: Well nourished, well developed, in no acute distress.  AFFECT: WNL, alert and oriented x 3.  SKIN: No hirsutism or acne.  NECK: Neck symmetric without masses or thyromegaly.  NODES: No inguinal, " cervical, axillary or femoral lymph node enlargement.  CHEST: Good respiratory effort.   ABDOMEN: Soft. No tenderness or masses.  BREASTS: Symmetrical, no skin changes or visible lesions. No palpable masses, nipple discharge bilaterally.  PELVIC: ATROPHIC EXTERNAL FEMALE GENITALIA without lesions. Normal hair distribution. Adequate perineal body, normal urethral meatus. VAGINA DRY / ATROPHIC  without lesions or discharge. CERVIX STENOTIC without lesions, discharge or tenderness. No significant cystocele or rectocele. Bimanual exam shows uterus to be normal size, regular, mobile and nontender. Adnexa without masses or tenderness.  RECTAL: Rectovaginal exam confirms above with normal sphincter tone, no masses.  EXTREMITIES: No edema.    DIAGNOSIS:  1. Well woman exam with routine gynecological exam    2. Postmenopause    3. Vaginal atrophy        PLAN:    Orders Placed This Encounter    Liquid-based pap smear, screening    estradiol (VAGIFEM) 10 mcg Tab   Up to date on mammogram, BMD and colonoscopy    COUNSELING:  The patient was counseled today on:  -options for treatment of vaginal atrophy with water based vaginal lubricants vs vaginal estrogen cream, tablets, ring and she chose tablets;  -Vagifem use and potential side effects;  -osteoporosis prevention and regular weight bearing exercise;  -A.C.S. Pap and pelvic exam guidelines (pap every 3 years, no pap after age 65) and recommendations for yearly mammogram;  -to see her PCP for other health maintenance.    FOLLOW-UP with me in one year and with Dr Navarro in one year.

## 2019-03-16 ENCOUNTER — OFFICE VISIT (OUTPATIENT)
Dept: URGENT CARE | Facility: CLINIC | Age: 59
End: 2019-03-16
Payer: COMMERCIAL

## 2019-03-16 VITALS
WEIGHT: 110 LBS | RESPIRATION RATE: 19 BRPM | BODY MASS INDEX: 18.78 KG/M2 | SYSTOLIC BLOOD PRESSURE: 136 MMHG | HEIGHT: 64 IN | HEART RATE: 94 BPM | OXYGEN SATURATION: 97 % | TEMPERATURE: 102 F | DIASTOLIC BLOOD PRESSURE: 88 MMHG

## 2019-03-16 DIAGNOSIS — J02.9 SORE THROAT: Primary | ICD-10-CM

## 2019-03-16 DIAGNOSIS — R50.9 FEVER, UNSPECIFIED FEVER CAUSE: ICD-10-CM

## 2019-03-16 DIAGNOSIS — J10.1 INFLUENZA A: ICD-10-CM

## 2019-03-16 LAB
CTP QC/QA: YES
CTP QC/QA: YES
FLUAV AG NPH QL: POSITIVE
FLUBV AG NPH QL: NEGATIVE
S PYO RRNA THROAT QL PROBE: NEGATIVE

## 2019-03-16 PROCEDURE — 99214 PR OFFICE/OUTPT VISIT, EST, LEVL IV, 30-39 MIN: ICD-10-PCS | Mod: 25,S$GLB,, | Performed by: FAMILY MEDICINE

## 2019-03-16 PROCEDURE — 96372 PR INJECTION,THERAP/PROPH/DIAG2ST, IM OR SUBCUT: ICD-10-PCS | Mod: S$GLB,,, | Performed by: FAMILY MEDICINE

## 2019-03-16 PROCEDURE — 96372 THER/PROPH/DIAG INJ SC/IM: CPT | Mod: S$GLB,,, | Performed by: FAMILY MEDICINE

## 2019-03-16 PROCEDURE — 87880 POCT RAPID STREP A: ICD-10-PCS | Mod: QW,S$GLB,, | Performed by: FAMILY MEDICINE

## 2019-03-16 PROCEDURE — 87804 INFLUENZA ASSAY W/OPTIC: CPT | Mod: QW,S$GLB,, | Performed by: FAMILY MEDICINE

## 2019-03-16 PROCEDURE — 3008F PR BODY MASS INDEX (BMI) DOCUMENTED: ICD-10-PCS | Mod: CPTII,S$GLB,, | Performed by: FAMILY MEDICINE

## 2019-03-16 PROCEDURE — 3008F BODY MASS INDEX DOCD: CPT | Mod: CPTII,S$GLB,, | Performed by: FAMILY MEDICINE

## 2019-03-16 PROCEDURE — 87804 POCT INFLUENZA A/B: ICD-10-PCS | Mod: 59,QW,S$GLB, | Performed by: FAMILY MEDICINE

## 2019-03-16 PROCEDURE — 99214 OFFICE O/P EST MOD 30 MIN: CPT | Mod: 25,S$GLB,, | Performed by: FAMILY MEDICINE

## 2019-03-16 PROCEDURE — 87880 STREP A ASSAY W/OPTIC: CPT | Mod: QW,S$GLB,, | Performed by: FAMILY MEDICINE

## 2019-03-16 RX ORDER — OSELTAMIVIR PHOSPHATE 75 MG/1
75 CAPSULE ORAL 2 TIMES DAILY
Qty: 10 CAPSULE | Refills: 0 | Status: SHIPPED | OUTPATIENT
Start: 2019-03-16 | End: 2019-03-21

## 2019-03-16 RX ORDER — BETAMETHASONE SODIUM PHOSPHATE AND BETAMETHASONE ACETATE 3; 3 MG/ML; MG/ML
6 INJECTION, SUSPENSION INTRA-ARTICULAR; INTRALESIONAL; INTRAMUSCULAR; SOFT TISSUE
Status: COMPLETED | OUTPATIENT
Start: 2019-03-16 | End: 2019-03-16

## 2019-03-16 RX ORDER — IBUPROFEN 800 MG/1
800 TABLET ORAL 2 TIMES DAILY
Qty: 14 TABLET | Refills: 0 | Status: SHIPPED | OUTPATIENT
Start: 2019-03-16 | End: 2019-03-23

## 2019-03-16 RX ADMIN — BETAMETHASONE SODIUM PHOSPHATE AND BETAMETHASONE ACETATE 6 MG: 3; 3 INJECTION, SUSPENSION INTRA-ARTICULAR; INTRALESIONAL; INTRAMUSCULAR; SOFT TISSUE at 11:03

## 2019-03-16 NOTE — PROGRESS NOTES
"Subjective:       Patient ID: Ranjana Dominguez is a 58 y.o. female.    Vitals:  height is 5' 4" (1.626 m) and weight is 49.9 kg (110 lb). Her oral temperature is 102 °F (38.9 °C) (abnormal). Her blood pressure is 136/88 and her pulse is 94. Her respiration is 19 and oxygen saturation is 97%.     Chief Complaint: URI and Rash    URI    This is a new problem. The current episode started in the past 7 days (thursday). The problem has been gradually worsening. The maximum temperature recorded prior to her arrival was 102 - 102.9 F. The fever has been present for 1 to 2 days. Associated symptoms include a sore throat. Pertinent negatives include no abdominal pain, chest pain, congestion, coughing, diarrhea, dysuria, ear pain, headaches, joint pain, joint swelling, nausea, neck pain, plugged ear sensation, rash, rhinorrhea, sinus pain, sneezing, swollen glands, vomiting or wheezing. She has tried acetaminophen and NSAIDs for the symptoms. The treatment provided mild relief.   Rash   This is a new problem. The current episode started today. The problem is unchanged. The affected locations include the chest. The rash is characterized by redness. It is unknown if there was an exposure to a precipitant. Associated symptoms include a fever and a sore throat. Pertinent negatives include no anorexia, congestion, cough, diarrhea, eye pain, facial edema, fatigue, joint pain, nail changes, rhinorrhea, shortness of breath or vomiting. Past treatments include nothing. The treatment provided no relief. There is no history of allergies, asthma, eczema or varicella.       Constitution: Positive for fever. Negative for chills, sweating and fatigue.   HENT: Positive for sore throat. Negative for ear pain, facial swelling, congestion, sinus pain, sinus pressure and voice change.    Neck: Negative for neck pain and painful lymph nodes.   Cardiovascular: Negative for chest pain.   Eyes: Negative for eye itching, eye pain, eye redness " and eyelid swelling.   Respiratory: Negative for chest tightness, cough, sputum production, bloody sputum, COPD, shortness of breath, stridor, wheezing and asthma.    Gastrointestinal: Negative for abdominal pain, nausea, vomiting and diarrhea.   Genitourinary: Negative for dysuria.   Musculoskeletal: Negative for joint pain, joint swelling and muscle ache.   Skin: Negative for color change, pale, rash, wound, abrasion, laceration, lesion, skin thickening/induration, puncture wound, erythema, bruising, abscess, avulsion and hives.   Allergic/Immunologic: Negative for environmental allergies, seasonal allergies, asthma, immunocompromised state, hives and sneezing.   Neurological: Negative for headaches.   Hematologic/Lymphatic: Negative for swollen lymph nodes.       Objective:      Physical Exam   Skin: No erythema.       Assessment:       1. Sore throat    2. Fever, unspecified fever cause    3. Influenza A        Plan:         Sore throat  -     POCT rapid strep A    Fever, unspecified fever cause  -     POCT Influenza A/B    Influenza A    Other orders  -     oseltamivir (TAMIFLU) 75 MG capsule; Take 1 capsule (75 mg total) by mouth 2 (two) times daily. for 5 days  Dispense: 10 capsule; Refill: 0  -     ibuprofen (ADVIL,MOTRIN) 800 MG tablet; Take 1 tablet (800 mg total) by mouth 2 (two) times daily. for 7 days  Dispense: 14 tablet; Refill: 0  -     betamethasone acetate-betamethasone sodium phosphate injection 6 mg

## 2019-03-16 NOTE — PATIENT INSTRUCTIONS

## 2019-07-02 ENCOUNTER — PATIENT MESSAGE (OUTPATIENT)
Dept: INTERNAL MEDICINE | Facility: CLINIC | Age: 59
End: 2019-07-02

## 2019-07-11 ENCOUNTER — PATIENT MESSAGE (OUTPATIENT)
Dept: INTERNAL MEDICINE | Facility: CLINIC | Age: 59
End: 2019-07-11

## 2019-08-06 DIAGNOSIS — Z00.00 ROUTINE GENERAL MEDICAL EXAMINATION AT A HEALTH CARE FACILITY: Primary | ICD-10-CM

## 2019-08-15 ENCOUNTER — HOSPITAL ENCOUNTER (OUTPATIENT)
Dept: RADIOLOGY | Facility: HOSPITAL | Age: 59
Discharge: HOME | End: 2019-08-15
Attending: INTERNAL MEDICINE
Payer: COMMERCIAL

## 2019-08-15 ENCOUNTER — PATIENT MESSAGE (OUTPATIENT)
Dept: INTERNAL MEDICINE | Facility: CLINIC | Age: 59
End: 2019-08-15

## 2019-08-15 ENCOUNTER — CLINICAL SUPPORT (OUTPATIENT)
Dept: INTERNAL MEDICINE | Facility: CLINIC | Age: 59
End: 2019-08-15
Payer: COMMERCIAL

## 2019-08-15 ENCOUNTER — OFFICE VISIT (OUTPATIENT)
Dept: INTERNAL MEDICINE | Facility: CLINIC | Age: 59
End: 2019-08-15
Payer: COMMERCIAL

## 2019-08-15 ENCOUNTER — HOSPITAL ENCOUNTER (OUTPATIENT)
Dept: CARDIOLOGY | Facility: CLINIC | Age: 59
Discharge: HOME | End: 2019-08-15
Payer: COMMERCIAL

## 2019-08-15 VITALS
BODY MASS INDEX: 19.92 KG/M2 | SYSTOLIC BLOOD PRESSURE: 110 MMHG | HEIGHT: 63 IN | WEIGHT: 112.44 LBS | DIASTOLIC BLOOD PRESSURE: 70 MMHG

## 2019-08-15 DIAGNOSIS — Z00.00 ROUTINE GENERAL MEDICAL EXAMINATION AT A HEALTH CARE FACILITY: Primary | ICD-10-CM

## 2019-08-15 DIAGNOSIS — Z00.00 ROUTINE GENERAL MEDICAL EXAMINATION AT A HEALTH CARE FACILITY: ICD-10-CM

## 2019-08-15 DIAGNOSIS — Z00.00 ANNUAL PHYSICAL EXAM: Primary | ICD-10-CM

## 2019-08-15 LAB
ALBUMIN SERPL BCP-MCNC: 3.9 G/DL (ref 3.5–5.2)
ALP SERPL-CCNC: 65 U/L (ref 55–135)
ALT SERPL W/O P-5'-P-CCNC: 16 U/L (ref 10–44)
ANION GAP SERPL CALC-SCNC: 6 MMOL/L (ref 8–16)
AST SERPL-CCNC: 21 U/L (ref 10–40)
BILIRUB SERPL-MCNC: 0.6 MG/DL (ref 0.1–1)
BUN SERPL-MCNC: 19 MG/DL (ref 6–20)
CALCIUM SERPL-MCNC: 9.7 MG/DL (ref 8.7–10.5)
CHLORIDE SERPL-SCNC: 104 MMOL/L (ref 95–110)
CHOLEST SERPL-MCNC: 217 MG/DL (ref 120–199)
CHOLEST/HDLC SERPL: 2.2 {RATIO} (ref 2–5)
CO2 SERPL-SCNC: 31 MMOL/L (ref 23–29)
CREAT SERPL-MCNC: 0.8 MG/DL (ref 0.5–1.4)
ERYTHROCYTE [DISTWIDTH] IN BLOOD BY AUTOMATED COUNT: 11.8 % (ref 11.5–14.5)
EST. GFR  (AFRICAN AMERICAN): >60 ML/MIN/1.73 M^2
EST. GFR  (NON AFRICAN AMERICAN): >60 ML/MIN/1.73 M^2
ESTIMATED AVG GLUCOSE: 97 MG/DL (ref 68–131)
GLUCOSE SERPL-MCNC: 114 MG/DL (ref 70–110)
HBA1C MFR BLD HPLC: 5 % (ref 4–5.6)
HCT VFR BLD AUTO: 46.1 % (ref 37–48.5)
HDLC SERPL-MCNC: 97 MG/DL (ref 40–75)
HDLC SERPL: 44.7 % (ref 20–50)
HGB BLD-MCNC: 15.7 G/DL (ref 12–16)
LDLC SERPL CALC-MCNC: 103.4 MG/DL (ref 63–159)
MCH RBC QN AUTO: 32.8 PG (ref 27–31)
MCHC RBC AUTO-ENTMCNC: 34.1 G/DL (ref 32–36)
MCV RBC AUTO: 96 FL (ref 82–98)
NONHDLC SERPL-MCNC: 120 MG/DL
PLATELET # BLD AUTO: 284 K/UL (ref 150–350)
PMV BLD AUTO: 10.2 FL (ref 9.2–12.9)
POTASSIUM SERPL-SCNC: 4.4 MMOL/L (ref 3.5–5.1)
PROT SERPL-MCNC: 6.5 G/DL (ref 6–8.4)
RBC # BLD AUTO: 4.78 M/UL (ref 4–5.4)
SODIUM SERPL-SCNC: 141 MMOL/L (ref 136–145)
T4 FREE SERPL-MCNC: 0.94 NG/DL (ref 0.71–1.51)
TRIGL SERPL-MCNC: 83 MG/DL (ref 30–150)
TSH SERPL DL<=0.005 MIU/L-ACNC: 4.92 UIU/ML (ref 0.4–4)
WBC # BLD AUTO: 3.86 K/UL (ref 3.9–12.7)

## 2019-08-15 PROCEDURE — 85027 COMPLETE CBC AUTOMATED: CPT

## 2019-08-15 PROCEDURE — 84439 ASSAY OF FREE THYROXINE: CPT

## 2019-08-15 PROCEDURE — 77067 SCR MAMMO BI INCL CAD: CPT | Mod: TC

## 2019-08-15 PROCEDURE — 93005 ELECTROCARDIOGRAM TRACING: CPT | Mod: PBBFAC | Performed by: INTERNAL MEDICINE

## 2019-08-15 PROCEDURE — 77067 SCR MAMMO BI INCL CAD: CPT | Mod: 26,,, | Performed by: RADIOLOGY

## 2019-08-15 PROCEDURE — 99396 PR PREVENTIVE VISIT,EST,40-64: ICD-10-PCS | Mod: S$GLB,,, | Performed by: INTERNAL MEDICINE

## 2019-08-15 PROCEDURE — 77063 MAMMO DIGITAL SCREENING BILAT WITH TOMOSYNTHESIS_CAD: ICD-10-PCS | Mod: 26,,, | Performed by: RADIOLOGY

## 2019-08-15 PROCEDURE — 93010 EKG 12-LEAD: ICD-10-PCS | Mod: S$PBB,,, | Performed by: INTERNAL MEDICINE

## 2019-08-15 PROCEDURE — 84443 ASSAY THYROID STIM HORMONE: CPT

## 2019-08-15 PROCEDURE — 36415 COLL VENOUS BLD VENIPUNCTURE: CPT

## 2019-08-15 PROCEDURE — 77067 MAMMO DIGITAL SCREENING BILAT WITH TOMOSYNTHESIS_CAD: ICD-10-PCS | Mod: 26,,, | Performed by: RADIOLOGY

## 2019-08-15 PROCEDURE — 80053 COMPREHEN METABOLIC PANEL: CPT

## 2019-08-15 PROCEDURE — 80061 LIPID PANEL: CPT

## 2019-08-15 PROCEDURE — 99999 PR PBB SHADOW E&M-EST. PATIENT-LVL III: ICD-10-PCS | Mod: PBBFAC,,, | Performed by: INTERNAL MEDICINE

## 2019-08-15 PROCEDURE — 93010 ELECTROCARDIOGRAM REPORT: CPT | Mod: S$PBB,,, | Performed by: INTERNAL MEDICINE

## 2019-08-15 PROCEDURE — 77063 BREAST TOMOSYNTHESIS BI: CPT | Mod: 26,,, | Performed by: RADIOLOGY

## 2019-08-15 PROCEDURE — 83036 HEMOGLOBIN GLYCOSYLATED A1C: CPT

## 2019-08-15 PROCEDURE — 99999 PR PBB SHADOW E&M-EST. PATIENT-LVL III: CPT | Mod: PBBFAC,,, | Performed by: INTERNAL MEDICINE

## 2019-08-15 PROCEDURE — 99396 PREV VISIT EST AGE 40-64: CPT | Mod: S$GLB,,, | Performed by: INTERNAL MEDICINE

## 2019-08-15 NOTE — LETTER
August 15, 2019    Ranjana Dominguez  [address]  [place] [address]             [facility]  [address]  [facility] [address]  Phone: [phone]  Fax: [phone] Dear Mrs. Dominguez:    Thank you for allowing me to serve you and perform your [facility] exam on 8/15/2019.  This letter will serve a brief summary of the history, physical findings, and laboratory/studies performed and recommendations at that time.    Reason for Visit: [facility] Preventive Physical Examination    Past Medical History:   Diagnosis Date    Allergy     Anxiety     Colon polyp, hyperplastic: see colonoscopy 2018    Depression     Family history of colon cancer: father in his 60s 2016    Glucose intolerance (impaired glucose tolerance) 2012    Headache(784.0)     History of bariatric surgery 2018    Hypothyroidism     Memory loss     Menopause syndrome     Mitral valve disorders(424.0) 2012    Obesity     Other and unspecified hyperlipidemia     Sleep apnea: per sleep study 2015; CPAP at 11 cm recommended 2015    Special screening for malignant neoplasms, colon 2015    Thyroid disease        Past Surgical History:   Procedure Laterality Date    APPENDECTOMY  2015    BREAST CYST ASPIRATION       SECTION, CLASSIC      COLONOSCOPY N/A 2018    [facility]formed [facility] at [facility] (4TH FLR)    COLONOSCOPY N/A 2015    Performed by [facility] at [facility] (4TH FLR)    FRACTURE SURGERY      HERNIA REPAIR      umbilical hernia    NOSE SURGERY      SLEEVE GASTROPLASTY         Family History   Problem Relation Age of Onset    Sleep apnea Mother     Cancer Mother         bladder sarcoma, smoking    Sleep apnea Father     Depression Father         Bipolar    Cancer Father         colon    Colon cancer Father     Depression Sister         suicide    No Known Problems Brother      No Known Problems Daughter     Cancer Maternal Grandmother         lymphoma    Heart disease Paternal Grandmother     Melanoma Neg Hx     Skin cancer Neg Hx     Breast cancer Neg Hx     Ovarian cancer Neg Hx             Review of patient's allergies indicates:   Allergen Reactions    Decongestant tablet     Hydrocodone Itching         Current Outpatient Medications:     cetirizine (ZYRTEC) 10 mg Cap, Take by mouth., Disp: , Rfl:     fluticasone (FLONASE) 50 mcg/actuation nasal spray, SHAKE LIQUID AND USE 2 SPRAYS(100 MCG) IN EACH NOSTRIL EVERY DAY, Disp: 48 mL, Rfl: 0    levothyroxine (SYNTHROID) 125 MCG tablet, TK 1 T PO QD, Disp: , Rfl: 4    lorazepam (ATIVAN) 0.5 MG tablet, , Disp: , Rfl: 2    sulfacetamide sodium-sulfur 10-5 % (w/w) Clsr, , Disp: , Rfl: 10    venlafaxine (EFFEXOR) 25 MG Tab, TK 1 T PO TID, Disp: , Rfl: 4    venlafaxine (EFFEXOR) 50 MG Tab, TK 1 T PO  TID, Disp: , Rfl: 2    estradiol (VAGIFEM) 10 mcg Tab, Insert one tablet vaginally twice weekly H.S, Disp: 24 tablet, Rfl: 4  No current facility-administered medications for this visit.     Facility-Administered Medications Ordered in Other Visits:     dextrose 5 % and 0.45 % NaCl infusion, , Intravenous, Continuous, Hansel Shell MD    dextrose 5 % and 0.45 % NaCl infusion, , Intravenous, Continuous, Hansel Shell MD, Last Rate: 20 mL/hr at 08/13/18 0755    sodium chloride 0.9% flush 3 mL, 3 mL, Intravenous, PRN, Hansel Shell MD     Review of Systems  Review of Systems - Negative     Physical Exam:  General: General appearance: alert, well appearing, and in no distress.   Skin: Skin exam - normal coloration and turgor, no rashes, no suspicious skin lesions noted.  HEENT: Ears - bilateral TM's and external ear canals normal. , ENT exam reveals - ENT exam normal, no neck nodes or sinus tenderness.   Lungs: Chest: clear to auscultation, no wheezes, rales or rhonchi, symmetric air entry.   Heart: CVS exam: normal  rate, regular rhythm, normal S1, S2, no murmurs, rubs, clicks or gallops.   Extremities: Exam of extremities: peripheral pulses normal, no pedal edema, no clubbing or cyanosis    Labs:  Results for orders placed or performed in visit on 08/15/19   Comprehensive metabolic panel   Result Value Ref Range    Sodium 141 136 - 145 mmol/L    Potassium 4.4 3.5 - 5.1 mmol/L    Chloride 104 95 - 110 mmol/L    CO2 31 (H) 23 - 29 mmol/L    Glucose 114 (H) 70 - 110 mg/dL    BUN, Bld 19 6 - 20 mg/dL    Creatinine 0.8 0.5 - 1.4 mg/dL    Calcium 9.7 8.7 - 10.5 mg/dL    Total Protein 6.5 6.0 - 8.4 g/dL    Albumin 3.9 3.5 - 5.2 g/dL    Total Bilirubin 0.6 0.1 - 1.0 mg/dL    Alkaline Phosphatase 65 55 - 135 U/L    AST 21 10 - 40 U/L    ALT 16 10 - 44 U/L    Anion Gap 6 (L) 8 - 16 mmol/L    eGFR if African American >60.0 >60 mL/min/1.73 m^2    eGFR if non African American >60.0 >60 mL/min/1.73 m^2   CBC Without Differential   Result Value Ref Range    WBC 3.86 (L) 3.90 - 12.70 K/uL    RBC 4.78 4.00 - 5.40 M/uL    Hemoglobin 15.7 12.0 - 16.0 g/dL    Hematocrit 46.1 37.0 - 48.5 %    Mean Corpuscular Volume 96 82 - 98 fL    Mean Corpuscular Hemoglobin 32.8 (H) 27.0 - 31.0 pg    Mean Corpuscular Hemoglobin Conc 34.1 32.0 - 36.0 g/dL    RDW 11.8 11.5 - 14.5 %    Platelets 284 150 - 350 K/uL    MPV 10.2 9.2 - 12.9 fL   Lipid panel   Result Value Ref Range    Cholesterol 217 (H) 120 - 199 mg/dL    Triglycerides 83 30 - 150 mg/dL    HDL 97 (H) 40 - 75 mg/dL    LDL Cholesterol 103.4 63.0 - 159.0 mg/dL    Hdl/Cholesterol Ratio 44.7 20.0 - 50.0 %    Total Cholesterol/HDL Ratio 2.2 2.0 - 5.0    Non-HDL Cholesterol 120 mg/dL   TSH   Result Value Ref Range    TSH 4.921 (H) 0.400 - 4.000 uIU/mL   Hemoglobin A1c   Result Value Ref Range    Hemoglobin A1C 5.0 4.0 - 5.6 %    Estimated Avg Glucose 97 68 - 131 mg/dL   T4, free   Result Value Ref Range    Free T4 0.94 0.71 - 1.51 ng/dL      EKG was acceptable.    Mammogram was  acceptable.    Assessment/Recommendations:  Routine Health Maintenance:  I recommend an annual flu shot.  I also recommend that you try to get the shingles vaccine which is a series of 2.    At this time, you appear to be in good medical condition.   Please keep all your follow-ups with your other physicians and work on gaining 5 lb in the next year.  We can do a bone density exam sometime before the age of 65.  I look forward to seeing you again next year.  Please contact me should you have any questions or concerns regarding physical findings, or my recommendations.        Sincerely,            Malena Pinto MD, FACP

## 2019-08-15 NOTE — PROGRESS NOTES
Subjective:       Patient ID: Ranjana Dominguez is a 59 y.o. female.    Chief Complaint: [employer] [facility]    [facility]     Sees  at  for thyroid.  Reviewed labs, slightly underactive.  However, she feels well and given her BMI of 19 I am reluctant to increase her medication.     BP doing well.     Weight loss has stabilized, now BMI is 19.     Follows with bariatric physician (Dr. Kaur) twice a year and he does labs.  No longer follows with Psych.  Weaning Effexor from 150 to 100.     Due for gyn and colonoscopy.  Gyn has been scheduled.  Colonoscopy has been ordered but not scheduled, this was reiterated.    Patient Active Problem List:     Anxiety     Glucose intolerance (impaired glucose tolerance)     Mixed hyperlipidemia     Acquired hypothyroidism     Family history of colon cancer: father in his 60s     History of bariatric surgery: sleeve 2017     Colon polyp, hyperplastic: see colonoscopy 8/18      Review of Systems   Constitutional: Negative for activity change, appetite change, chills, fatigue and fever.   HENT: Negative for congestion, hearing loss, sinus pressure and sore throat.    Eyes: Negative for visual disturbance.   Respiratory: Negative for apnea, cough, shortness of breath and wheezing.    Cardiovascular: Negative for chest pain, palpitations and leg swelling.   Gastrointestinal: Negative for abdominal distention, abdominal pain, constipation, diarrhea, nausea and vomiting.   Endocrine: Negative for polydipsia, polyphagia and polyuria.   Genitourinary: Negative for dysuria, frequency, hematuria and vaginal bleeding.   Musculoskeletal: Negative for gait problem, joint swelling and myalgias.   Skin: Negative for rash.   Neurological: Negative for dizziness, weakness, light-headedness and headaches.   Hematological: Negative for adenopathy. Does not bruise/bleed easily.   Psychiatric/Behavioral: Negative for confusion, hallucinations, sleep disturbance and suicidal ideas.        Objective:      Physical Exam   Constitutional: She is oriented to person, place, and time. She appears well-developed and well-nourished.   HENT:   Head: Normocephalic and atraumatic.   Right Ear: External ear normal.   Left Ear: External ear normal.   Nose: Nose normal.   Mouth/Throat: Oropharynx is clear and moist. No oropharyngeal exudate.   Eyes: Conjunctivae and EOM are normal. No scleral icterus.   Neck: Normal range of motion. Neck supple. No JVD present. No thyromegaly present.   Cardiovascular: Normal rate, regular rhythm, normal heart sounds and intact distal pulses. Exam reveals no gallop.   No murmur heard.  Pulmonary/Chest: Effort normal and breath sounds normal. No respiratory distress. She has no wheezes.   Abdominal: Soft. Bowel sounds are normal. She exhibits no distension and no mass. There is no tenderness. There is no rebound and no guarding.   Musculoskeletal: Normal range of motion. She exhibits no edema or tenderness.   Lymphadenopathy:     She has no cervical adenopathy.   Neurological: She is alert and oriented to person, place, and time. She displays normal reflexes. No cranial nerve deficit. Coordination normal.   Skin: Skin is warm. No rash noted. No erythema.   Psychiatric: She has a normal mood and affect. Her behavior is normal. Judgment and thought content normal.   Nursing note and vitals reviewed.      Assessment:       1. Annual physical exam        Plan:         IFG reviewed, A1c is 5    Mammogram to be obtained    Other labs an EKG acceptable    Continue with exercise and follow-ups with her other physicians    Try to gain 5 lb in the next year    Flu shot and shingles vaccine when available

## 2019-08-15 NOTE — PATIENT INSTRUCTIONS
Prevention Guidelines, Women Ages 50 to 64  Screening tests and vaccines are an important part of managing your health. Health counseling is essential, too. Below are guidelines for these, for women ages 50 to 64. Talk with your healthcare provider to make sure youre up to date on what you need.  Screening Who needs it How often   Type 2 diabetes or prediabetes All adults beginning at age 45 and adults without symptoms at any age who are overweight or obese and have 1 or more additional risk factors for diabetes. At  least every 3 years   Alcohol misuse All women in this age group At routine exams   Blood pressure All women in this age group Every 2 years if your blood pressure is less than 120/80 mm Hg; yearly if your systolic blood pressure is 120 to 139 mm Hg, or your diastolic blood pressure reading is 80 to 89 mm Hg   Breast cancer All women in this age group Yearly mammogram and clinical breast exam1   Cervical cancer All women in this age group, except women who have had a complete hysterectomy Pap test every 3 years or Pap test with human papillomavirus (HPV) test every 5 years   Chlamydia Women at increased risk for infection At routine exams   Colorectal cancer All women in this age group Flexible sigmoidoscopy every 5 years, or colonoscopy every 10 years, or double-contrast barium enema every 5 years; yearly fecal occult blood test or fecal immunochemical test; or a stool DNA test as often as your health care provider advises; talk with your health care provider about which tests are best for you   Depression All women in this age group At routine exams   Gonorrhea Sexually active women at increased risk for infection At routine exams   Hepatitis C Anyone at increased risk; 1 time for those born between 1945 and 1965 At routine exams   High cholesterol or triglycerides All women in this age group who are at risk for coronary artery disease At least every 5 years   HIV All women At routine exams   Lung  cancer Adults age 55 to 80 who have smoked Yearly screening in smokers with 30 pack-year history of smoking or who quit within 15 years   Obesity All women in this age group At routine exams   Osteoporosis Women who are postmenopausal Ask your healthcare provider   Syphilis Women at increased risk for infection - talk with your healthcare provider At routine exams   Tuberculosis Women at increased risk for infection - talk with your healthcare provider Ask your healthcare provider   Vision All women in this age group Ask your healthcare provider   Vaccine Who needs it How often   Chickenpox (varicella) All women in this age group who have no record of this infection or vaccine 2 doses; the second dose should be given at least 4 weeks after the first dose   Hepatitis A Women at increased risk for infection - talk with your healthcare provider 2 doses given at least 6 months apart   Hepatitis B Women at increased risk for infection - talk with your healthcare provider 3 doses over 6 months; second dose should be given 1 month after the first dose; the third dose should be given at least 2 months after the second dose and at least 4 months after the first dose   Haemophilus influenzaeType B (HIB) Women at increased risk for infection - talk with your healthcare provider 1 to 3 doses   Influenza (flu) All women in this age group Once a year   Measles, mumps, rubella (MMR) Women in this age group through their late 50s who have no record of these infections or vaccines 1 dose   Meningococcal Women at increased risk for infection - talk with your healthcare provider 1 or more doses   Pneumococcal conjugate vaccine (PCV13) and pneumococcal polysaccharide vaccine (PPSV23) Women at increased risk for infection - talk with your healthcare provider PCV13: 1 dose ages 19 to 65 (protects against 13 types of pneumococcal bacteria)  PPSV23: 1 to 2 doses through age 64, or 1 dose at 65 or older (protects against 23 types of  pneumococcal bacteria)   Tetanus/diphtheria/pertussis (Td/Tdap) booster All women in this age group Td every 10 years, or a one-time dose of Tdap instead of a Td booster after age 18, then Td every 10 years   Zoster All women ages 60 and older 1 dose   Counseling Who needs it How often   BRCA gene mutation testing for breast and ovarian cancer susceptibility Women with increased risk for having gene mutation When your risk is known   Breast cancer and chemoprevention Women at high risk for breast cancer When your risk is known   Diet and exercise Women who are overweight or obese When diagnosed, and then at routine exams   Sexually transmitted infection prevention Women at increased risk for infection - talk with your healthcare provider At routine exams   Use of daily aspirin Women ages 55 and up in this age group who are at risk for cardiovascular health problems such as stroke When your risk is known   Use of tobacco and the health effects it can cause All women in this age group Every exam   1American [employer]  Date Last Reviewed: 1/26/2016  © 6272-5095 [employer]. [address] [place] [address], [place] [address]. All rights reserved. This information is not intended as a substitute for professional medical care. Always follow your healthcare professional's instructions.    [employer] when available

## 2019-08-23 ENCOUNTER — PATIENT MESSAGE (OUTPATIENT)
Dept: INTERNAL MEDICINE | Facility: CLINIC | Age: 59
End: 2019-08-23

## 2020-07-06 ENCOUNTER — IMMUNIZATION (OUTPATIENT)
Dept: PHARMACY | Facility: CLINIC | Age: 60
End: 2020-07-06
Payer: COMMERCIAL

## 2020-07-06 ENCOUNTER — OFFICE VISIT (OUTPATIENT)
Dept: INTERNAL MEDICINE | Facility: CLINIC | Age: 60
End: 2020-07-06
Payer: COMMERCIAL

## 2020-07-06 VITALS
DIASTOLIC BLOOD PRESSURE: 75 MMHG | HEIGHT: 63 IN | BODY MASS INDEX: 19.84 KG/M2 | WEIGHT: 112 LBS | SYSTOLIC BLOOD PRESSURE: 120 MMHG

## 2020-07-06 DIAGNOSIS — K63.5 HYPERPLASTIC COLONIC POLYP, UNSPECIFIED PART OF COLON: ICD-10-CM

## 2020-07-06 DIAGNOSIS — Z98.84 HISTORY OF BARIATRIC SURGERY: ICD-10-CM

## 2020-07-06 DIAGNOSIS — E03.9 ACQUIRED HYPOTHYROIDISM: ICD-10-CM

## 2020-07-06 DIAGNOSIS — Z80.0 FAMILY HISTORY OF COLON CANCER: ICD-10-CM

## 2020-07-06 DIAGNOSIS — Z12.31 SCREENING MAMMOGRAM, ENCOUNTER FOR: ICD-10-CM

## 2020-07-06 DIAGNOSIS — E78.2 MIXED HYPERLIPIDEMIA: ICD-10-CM

## 2020-07-06 DIAGNOSIS — Z00.00 ANNUAL PHYSICAL EXAM: Primary | ICD-10-CM

## 2020-07-06 LAB
BACTERIA #/AREA URNS AUTO: NORMAL /HPF
BILIRUB UR QL STRIP: NEGATIVE
CLARITY UR REFRACT.AUTO: CLEAR
COLOR UR AUTO: YELLOW
GLUCOSE UR QL STRIP: NEGATIVE
HGB UR QL STRIP: NEGATIVE
KETONES UR QL STRIP: NEGATIVE
LEUKOCYTE ESTERASE UR QL STRIP: ABNORMAL
MICROSCOPIC COMMENT: NORMAL
NITRITE UR QL STRIP: NEGATIVE
NON-SQ EPI CELLS #/AREA URNS AUTO: <1 /HPF
PH UR STRIP: 6 [PH] (ref 5–8)
PROT UR QL STRIP: NEGATIVE
SP GR UR STRIP: 1.01 (ref 1–1.03)
SQUAMOUS #/AREA URNS AUTO: 1 /HPF
URN SPEC COLLECT METH UR: ABNORMAL
WBC #/AREA URNS AUTO: 5 /HPF (ref 0–5)

## 2020-07-06 PROCEDURE — 81001 URINALYSIS AUTO W/SCOPE: CPT

## 2020-07-06 PROCEDURE — 99999 PR PBB SHADOW E&M-EST. PATIENT-LVL III: ICD-10-PCS | Mod: PBBFAC,,, | Performed by: INTERNAL MEDICINE

## 2020-07-06 PROCEDURE — 99999 PR PBB SHADOW E&M-EST. PATIENT-LVL III: CPT | Mod: PBBFAC,,, | Performed by: INTERNAL MEDICINE

## 2020-07-06 PROCEDURE — 99396 PREV VISIT EST AGE 40-64: CPT | Mod: S$GLB,,, | Performed by: INTERNAL MEDICINE

## 2020-07-06 PROCEDURE — 99396 PR PREVENTIVE VISIT,EST,40-64: ICD-10-PCS | Mod: S$GLB,,, | Performed by: INTERNAL MEDICINE

## 2020-07-06 RX ORDER — LEVOTHYROXINE SODIUM 100 UG/1
100 TABLET ORAL
Qty: 30 TABLET | Refills: 11 | Status: SHIPPED | OUTPATIENT
Start: 2020-07-06 | End: 2021-05-21

## 2020-07-06 NOTE — PROGRESS NOTES
Subjective:       Patient ID: Ranjana Dominguez is a 60 y.o. female.    Chief Complaint: Annual Exam    Annual exam    Overall ok    Some stress, laid off last December.  Has virtual visits with her counselor.  Feels OK overall.    Seen by her bariatric doctor, all labs ok other than TSH .02.  Discussed reducing.  BMI is still below 20 but slightly improved.  I have encouraged her to try to gain weight to get up to 120 lb.    Bone density in the last couple of years was normal; will recheck at the appropriate time.    Cholesterol 204; HDL 89; LDL 93.    Loose stools a few months ago but have abated; all studies acceptable.  No fever, chills or sweats.  No change in caliber or color of stool.  Colonoscopy is due next year.    The 10-year ASCVD risk score (Tarik PRESCOTT Jr., et al., 2013) is: 2.4% from last labs    Values used to calculate the score:      Age: 60 years      Sex: Female      Is Non- : No      Diabetic: No      Tobacco smoker: No      Systolic Blood Pressure: 120 mmHg      Is BP treated: No      HDL Cholesterol: 97 mg/dL      Total Cholesterol: 217 mg/dL    Patient Active Problem List:     Anxiety     Glucose intolerance (impaired glucose tolerance)     Mixed hyperlipidemia     Acquired hypothyroidism     Family history of colon cancer: father in his 60s     History of bariatric surgery: sleeve 2017     Colon polyp, hyperplastic: see colonoscopy 8/18      Review of Systems   Constitutional: Negative for activity change and unexpected weight change.   HENT: Negative for hearing loss, rhinorrhea and trouble swallowing.    Eyes: Negative for discharge and visual disturbance.   Respiratory: Negative for chest tightness and wheezing.    Cardiovascular: Negative for chest pain and palpitations.   Gastrointestinal: Positive for constipation and diarrhea. Negative for blood in stool and vomiting.        Had loose stools, this has abated.  Occasional sluggish bowels.  No current diarrhea,  no fever, chills, sweats or blood in her stool.  No change in stool caliber or color   Endocrine: Negative for polydipsia and polyuria.   Genitourinary: Positive for dysuria. Negative for difficulty urinating, hematuria and menstrual problem.        Had some mild symptoms, drink a lot of water and they have completely resolved.  No hematuria   Musculoskeletal: Negative for arthralgias, joint swelling and neck pain.   Neurological: Negative for weakness and headaches.   Psychiatric/Behavioral: Negative for confusion and dysphoric mood.        Situational stress, no SI or HI, sees counselor outside       Objective:      Physical Exam  Vitals signs and nursing note reviewed.   Constitutional:       Appearance: She is well-developed.   HENT:      Head: Normocephalic and atraumatic.      Right Ear: External ear normal.      Left Ear: External ear normal.      Nose: Nose normal.      Mouth/Throat:      Pharynx: No oropharyngeal exudate.   Eyes:      General: No scleral icterus.     Conjunctiva/sclera: Conjunctivae normal.   Neck:      Musculoskeletal: Normal range of motion and neck supple.      Thyroid: No thyromegaly.      Vascular: No JVD.   Cardiovascular:      Rate and Rhythm: Normal rate and regular rhythm.      Heart sounds: Normal heart sounds. No murmur. No gallop.    Pulmonary:      Effort: Pulmonary effort is normal. No respiratory distress.      Breath sounds: Normal breath sounds. No wheezing.   Abdominal:      General: Bowel sounds are normal. There is no distension.      Palpations: Abdomen is soft. There is no mass.      Tenderness: There is no abdominal tenderness. There is no guarding or rebound.   Musculoskeletal: Normal range of motion.         General: No tenderness.   Lymphadenopathy:      Cervical: No cervical adenopathy.   Skin:     General: Skin is warm.      Findings: No erythema or rash.      Comments: Large tattoo left shoulder   Neurological:      Mental Status: She is alert and oriented to  person, place, and time.      Cranial Nerves: No cranial nerve deficit.      Coordination: Coordination normal.      Deep Tendon Reflexes: Reflexes normal.   Psychiatric:         Behavior: Behavior normal.         Thought Content: Thought content normal.         Judgment: Judgment normal.         Assessment:       1. Annual physical exam    2. Mixed hyperlipidemia    3. Acquired hypothyroidism    4. History of bariatric surgery: sleeve 2017    5. Hyperplastic colonic polyp, unspecified part of colon    6. Family history of colon cancer: father in his 60s    7. Screening mammogram, encounter for        Plan:         Ranjana was seen today for annual exam.    Diagnoses and all orders for this visit:    Annual physical exam  -     Urinalysis, Reflex to Urine Culture Urine, Clean Catch    Mixed hyperlipidemia; still not a candidate for statin therapy    Acquired hypothyroidism; reduce Synthroid to 100 mcg and recheck labs in 3 months  -     TSH; Future    History of bariatric surgery: sleeve 2017; stable.  Encourage her to try to work on gaining another 8 lb to get her BMI above 20.  She feels she can do this.  No alarm symptoms.  -     Vitamin B12; Future    Hyperplastic colonic polyp, unspecified part of colon; colonoscopy due next year    Family history of colon cancer: father in his 60s; colonoscopy due next year    Screening mammogram, encounter for  -     Mammo Digital Screening Bilat w/ [facility]; Standing    Other orders  -     levothyroxine (SYNTHROID) 100 MCG tablet; Take 1 tablet (100 mcg total) by mouth before breakfast.      Shingles vaccine today  I will review all studies and determine further tx depending on findings

## 2020-08-11 ENCOUNTER — PATIENT MESSAGE (OUTPATIENT)
Dept: INTERNAL MEDICINE | Facility: CLINIC | Age: 60
End: 2020-08-11

## 2020-08-15 ENCOUNTER — HOSPITAL ENCOUNTER (OUTPATIENT)
Dept: RADIOLOGY | Facility: HOSPITAL | Age: 60
Discharge: HOME | End: 2020-08-15
Attending: INTERNAL MEDICINE
Payer: COMMERCIAL

## 2020-08-15 DIAGNOSIS — Z12.31 SCREENING MAMMOGRAM, ENCOUNTER FOR: ICD-10-CM

## 2020-08-15 PROCEDURE — 77067 MAMMO DIGITAL SCREENING BILAT WITH TOMOSYNTHESIS_CAD: ICD-10-PCS | Mod: 26,,, | Performed by: RADIOLOGY

## 2020-08-15 PROCEDURE — 77063 BREAST TOMOSYNTHESIS BI: CPT | Mod: 26,,, | Performed by: RADIOLOGY

## 2020-08-15 PROCEDURE — 77063 MAMMO DIGITAL SCREENING BILAT WITH TOMOSYNTHESIS_CAD: ICD-10-PCS | Mod: 26,,, | Performed by: RADIOLOGY

## 2020-08-15 PROCEDURE — 77067 SCR MAMMO BI INCL CAD: CPT | Mod: 26,,, | Performed by: RADIOLOGY

## 2020-08-15 PROCEDURE — 77067 SCR MAMMO BI INCL CAD: CPT | Mod: TC

## 2020-09-08 ENCOUNTER — IMMUNIZATION (OUTPATIENT)
Dept: PHARMACY | Facility: CLINIC | Age: 60
End: 2020-09-08
Payer: COMMERCIAL

## 2020-09-08 ENCOUNTER — IMMUNIZATION (OUTPATIENT)
Dept: PHARMACY | Facility: CLINIC | Age: 60
End: 2020-09-08

## 2020-10-07 ENCOUNTER — TELEPHONE (OUTPATIENT)
Dept: INTERNAL MEDICINE | Facility: CLINIC | Age: 60
End: 2020-10-07

## 2020-10-07 NOTE — TELEPHONE ENCOUNTER
She needs labs, TSH and B12, please schedule and let me know what date scheduled, thank you, orders are in from July

## 2020-11-10 ENCOUNTER — LAB VISIT (OUTPATIENT)
Dept: LAB | Facility: HOSPITAL | Age: 60
End: 2020-11-10
Attending: INTERNAL MEDICINE
Payer: COMMERCIAL

## 2020-11-10 DIAGNOSIS — Z98.84 HISTORY OF BARIATRIC SURGERY: ICD-10-CM

## 2020-11-10 DIAGNOSIS — E03.9 ACQUIRED HYPOTHYROIDISM: ICD-10-CM

## 2020-11-10 LAB
TSH SERPL DL<=0.005 MIU/L-ACNC: 0.49 UIU/ML (ref 0.4–4)
VIT B12 SERPL-MCNC: 875 PG/ML (ref 210–950)

## 2020-11-10 PROCEDURE — 36415 COLL VENOUS BLD VENIPUNCTURE: CPT

## 2020-11-10 PROCEDURE — 84443 ASSAY THYROID STIM HORMONE: CPT

## 2020-11-10 PROCEDURE — 82607 VITAMIN B-12: CPT

## 2020-11-11 ENCOUNTER — PATIENT MESSAGE (OUTPATIENT)
Dept: INTERNAL MEDICINE | Facility: CLINIC | Age: 60
End: 2020-11-11

## 2020-11-11 NOTE — LETTER
November 19, 2020    Ranjana Dominguez  [address]  [place] [address]             Venkata Gallagher [facility]  [address]  [facility] [address]  Phone: [phone]  Fax: [phone] Dear Mrs. Dominguez:    Labs acceptable.  Please continue on your same regimen.     If you have any questions or concerns, please don't hesitate to call or you can respond to the my [facility] message which has this same information.    Sincerely,        Malena Pinto MD

## 2020-11-19 NOTE — TELEPHONE ENCOUNTER
Has not read [employer] message, letter sent today with  results and recommendations

## 2021-02-26 ENCOUNTER — IMMUNIZATION (OUTPATIENT)
Dept: PHARMACY | Facility: CLINIC | Age: 61
End: 2021-02-26
Payer: COMMERCIAL

## 2021-02-26 DIAGNOSIS — Z23 NEED FOR VACCINATION: Primary | ICD-10-CM

## 2021-03-09 ENCOUNTER — OFFICE VISIT (OUTPATIENT)
Dept: PODIATRY | Facility: CLINIC | Age: 61
End: 2021-03-09
Payer: COMMERCIAL

## 2021-03-09 ENCOUNTER — HOSPITAL ENCOUNTER (OUTPATIENT)
Dept: CARDIOLOGY | Facility: HOSPITAL | Age: 61
Discharge: HOME | End: 2021-03-09
Attending: PODIATRIST
Payer: COMMERCIAL

## 2021-03-09 ENCOUNTER — HOSPITAL ENCOUNTER (OUTPATIENT)
Dept: RADIOLOGY | Facility: HOSPITAL | Age: 61
Discharge: HOME | End: 2021-03-09
Attending: PODIATRIST
Payer: COMMERCIAL

## 2021-03-09 ENCOUNTER — TELEPHONE (OUTPATIENT)
Dept: RHEUMATOLOGY | Facility: CLINIC | Age: 61
End: 2021-03-09

## 2021-03-09 VITALS
SYSTOLIC BLOOD PRESSURE: 160 MMHG | BODY MASS INDEX: 19.84 KG/M2 | DIASTOLIC BLOOD PRESSURE: 87 MMHG | HEIGHT: 63 IN | HEART RATE: 76 BPM

## 2021-03-09 DIAGNOSIS — M79.676 PAIN AND SWELLING OF TOE, UNSPECIFIED LATERALITY: ICD-10-CM

## 2021-03-09 DIAGNOSIS — L53.9 ERYTHEMA OF TOE: ICD-10-CM

## 2021-03-09 DIAGNOSIS — M79.89 PAIN AND SWELLING OF TOE, UNSPECIFIED LATERALITY: ICD-10-CM

## 2021-03-09 DIAGNOSIS — L53.9 ERYTHEMA OF TOE: Primary | ICD-10-CM

## 2021-03-09 LAB
LEFT ABI: 1.11
LEFT ARM BP: 127 MMHG
LEFT DORSALIS PEDIS: 140 MMHG
LEFT POSTERIOR TIBIAL: 147 MMHG
RIGHT ABI: 1.09
RIGHT ARM BP: 133 MMHG
RIGHT DORSALIS PEDIS: 137 MMHG
RIGHT POSTERIOR TIBIAL: 145 MMHG

## 2021-03-09 PROCEDURE — 1125F AMNT PAIN NOTED PAIN PRSNT: CPT | Mod: S$GLB,,, | Performed by: PODIATRIST

## 2021-03-09 PROCEDURE — 3008F BODY MASS INDEX DOCD: CPT | Mod: CPTII,S$GLB,, | Performed by: PODIATRIST

## 2021-03-09 PROCEDURE — 99203 OFFICE O/P NEW LOW 30 MIN: CPT | Mod: S$GLB,,, | Performed by: PODIATRIST

## 2021-03-09 PROCEDURE — 73630 XR FOOT COMPLETE 3 VIEW BILATERAL: ICD-10-PCS | Mod: 26,,, | Performed by: RADIOLOGY

## 2021-03-09 PROCEDURE — 73630 X-RAY EXAM OF FOOT: CPT | Mod: 26,,, | Performed by: RADIOLOGY

## 2021-03-09 PROCEDURE — 93922 UPR/L XTREMITY ART 2 LEVELS: CPT

## 2021-03-09 PROCEDURE — 3008F PR BODY MASS INDEX (BMI) DOCUMENTED: ICD-10-PCS | Mod: CPTII,S$GLB,, | Performed by: PODIATRIST

## 2021-03-09 PROCEDURE — 99999 PR PBB SHADOW E&M-EST. PATIENT-LVL III: ICD-10-PCS | Mod: PBBFAC,,, | Performed by: PODIATRIST

## 2021-03-09 PROCEDURE — 99999 PR PBB SHADOW E&M-EST. PATIENT-LVL III: CPT | Mod: PBBFAC,,, | Performed by: PODIATRIST

## 2021-03-09 PROCEDURE — 93922 ANKLE BRACHIAL INDICES (ABI): ICD-10-PCS | Mod: 26,,, | Performed by: INTERNAL MEDICINE

## 2021-03-09 PROCEDURE — 1125F PR PAIN SEVERITY QUANTIFIED, PAIN PRESENT: ICD-10-PCS | Mod: S$GLB,,, | Performed by: PODIATRIST

## 2021-03-09 PROCEDURE — 93922 UPR/L XTREMITY ART 2 LEVELS: CPT | Mod: 26,,, | Performed by: INTERNAL MEDICINE

## 2021-03-09 PROCEDURE — 99203 PR OFFICE/OUTPT VISIT, NEW, LEVL III, 30-44 MIN: ICD-10-PCS | Mod: S$GLB,,, | Performed by: PODIATRIST

## 2021-03-09 PROCEDURE — 73630 X-RAY EXAM OF FOOT: CPT | Mod: TC,50,PO

## 2021-03-26 ENCOUNTER — IMMUNIZATION (OUTPATIENT)
Dept: PHARMACY | Facility: CLINIC | Age: 61
End: 2021-03-26
Payer: COMMERCIAL

## 2021-03-26 DIAGNOSIS — Z23 NEED FOR VACCINATION: Primary | ICD-10-CM

## 2021-05-21 ENCOUNTER — OFFICE VISIT (OUTPATIENT)
Dept: INTERNAL MEDICINE | Facility: CLINIC | Age: 61
End: 2021-05-21
Payer: COMMERCIAL

## 2021-05-21 VITALS
DIASTOLIC BLOOD PRESSURE: 80 MMHG | SYSTOLIC BLOOD PRESSURE: 120 MMHG | HEIGHT: 63 IN | BODY MASS INDEX: 20.2 KG/M2 | WEIGHT: 114 LBS

## 2021-05-21 DIAGNOSIS — Z80.0 FAMILY HISTORY OF COLON CANCER: ICD-10-CM

## 2021-05-21 DIAGNOSIS — Z12.31 SCREENING MAMMOGRAM, ENCOUNTER FOR: ICD-10-CM

## 2021-05-21 DIAGNOSIS — K63.5 HYPERPLASTIC COLONIC POLYP, UNSPECIFIED PART OF COLON: ICD-10-CM

## 2021-05-21 DIAGNOSIS — M81.0 OSTEOPOROSIS WITHOUT CURRENT PATHOLOGICAL FRACTURE, UNSPECIFIED OSTEOPOROSIS TYPE: ICD-10-CM

## 2021-05-21 DIAGNOSIS — Z00.00 ANNUAL PHYSICAL EXAM: Primary | ICD-10-CM

## 2021-05-21 DIAGNOSIS — E78.2 MIXED HYPERLIPIDEMIA: ICD-10-CM

## 2021-05-21 DIAGNOSIS — Z12.4 ENCOUNTER FOR PAPANICOLAOU SMEAR FOR CERVICAL CANCER SCREENING: ICD-10-CM

## 2021-05-21 DIAGNOSIS — Z98.84 HISTORY OF BARIATRIC SURGERY: ICD-10-CM

## 2021-05-21 DIAGNOSIS — R73.02 GLUCOSE INTOLERANCE (IMPAIRED GLUCOSE TOLERANCE): ICD-10-CM

## 2021-05-21 DIAGNOSIS — I73.00 RAYNAUD'S DISEASE WITHOUT GANGRENE: ICD-10-CM

## 2021-05-21 DIAGNOSIS — E03.9 ACQUIRED HYPOTHYROIDISM: ICD-10-CM

## 2021-05-21 PROCEDURE — 99396 PR PREVENTIVE VISIT,EST,40-64: ICD-10-PCS | Mod: S$GLB,,, | Performed by: INTERNAL MEDICINE

## 2021-05-21 PROCEDURE — 1126F PR PAIN SEVERITY QUANTIFIED, NO PAIN PRESENT: ICD-10-PCS | Mod: S$GLB,,, | Performed by: INTERNAL MEDICINE

## 2021-05-21 PROCEDURE — 99999 PR PBB SHADOW E&M-EST. PATIENT-LVL V: ICD-10-PCS | Mod: PBBFAC,,, | Performed by: INTERNAL MEDICINE

## 2021-05-21 PROCEDURE — 1126F AMNT PAIN NOTED NONE PRSNT: CPT | Mod: S$GLB,,, | Performed by: INTERNAL MEDICINE

## 2021-05-21 PROCEDURE — 99396 PREV VISIT EST AGE 40-64: CPT | Mod: S$GLB,,, | Performed by: INTERNAL MEDICINE

## 2021-05-21 PROCEDURE — 3008F PR BODY MASS INDEX (BMI) DOCUMENTED: ICD-10-PCS | Mod: CPTII,S$GLB,, | Performed by: INTERNAL MEDICINE

## 2021-05-21 PROCEDURE — 99999 PR PBB SHADOW E&M-EST. PATIENT-LVL V: CPT | Mod: PBBFAC,,, | Performed by: INTERNAL MEDICINE

## 2021-05-21 PROCEDURE — 3008F BODY MASS INDEX DOCD: CPT | Mod: CPTII,S$GLB,, | Performed by: INTERNAL MEDICINE

## 2021-05-21 RX ORDER — LEVOTHYROXINE SODIUM 88 UG/1
88 TABLET ORAL
Qty: 30 TABLET | Refills: 11 | Status: SHIPPED | OUTPATIENT
Start: 2021-05-21 | End: 2022-05-04 | Stop reason: SDUPTHER

## 2021-07-16 ENCOUNTER — HOSPITAL ENCOUNTER (OUTPATIENT)
Dept: RADIOLOGY | Facility: CLINIC | Age: 61
Discharge: HOME | End: 2021-07-16
Attending: INTERNAL MEDICINE
Payer: COMMERCIAL

## 2021-07-16 DIAGNOSIS — M81.0 OSTEOPOROSIS WITHOUT CURRENT PATHOLOGICAL FRACTURE, UNSPECIFIED OSTEOPOROSIS TYPE: ICD-10-CM

## 2021-07-16 PROCEDURE — 77080 DXA BONE DENSITY AXIAL: CPT | Mod: TC

## 2021-07-16 PROCEDURE — 77080 DXA BONE DENSITY AXIAL: CPT | Mod: 26,,, | Performed by: INTERNAL MEDICINE

## 2021-07-16 PROCEDURE — 77080 DEXA BONE DENSITY SPINE HIP: ICD-10-PCS | Mod: 26,,, | Performed by: INTERNAL MEDICINE

## 2021-07-28 PROBLEM — M85.89 OSTEOPENIA OF MULTIPLE SITES: Status: ACTIVE | Noted: 2021-07-28

## 2021-07-30 ENCOUNTER — OFFICE VISIT (OUTPATIENT)
Dept: OBSTETRICS AND GYNECOLOGY | Facility: CLINIC | Age: 61
End: 2021-07-30
Payer: COMMERCIAL

## 2021-07-30 VITALS
WEIGHT: 116.19 LBS | DIASTOLIC BLOOD PRESSURE: 84 MMHG | HEIGHT: 63 IN | SYSTOLIC BLOOD PRESSURE: 120 MMHG | BODY MASS INDEX: 20.59 KG/M2

## 2021-07-30 DIAGNOSIS — Z01.818 PRE-OP TESTING: Primary | ICD-10-CM

## 2021-07-30 DIAGNOSIS — Z01.419 WELL WOMAN EXAM WITH ROUTINE GYNECOLOGICAL EXAM: Primary | ICD-10-CM

## 2021-07-30 DIAGNOSIS — Z12.11 ENCOUNTER FOR SCREENING COLONOSCOPY: Primary | ICD-10-CM

## 2021-07-30 DIAGNOSIS — Z12.4 ENCOUNTER FOR PAPANICOLAOU SMEAR FOR CERVICAL CANCER SCREENING: ICD-10-CM

## 2021-07-30 PROCEDURE — 1126F PR PAIN SEVERITY QUANTIFIED, NO PAIN PRESENT: ICD-10-PCS | Mod: CPTII,S$GLB,, | Performed by: OBSTETRICS & GYNECOLOGY

## 2021-07-30 PROCEDURE — 87624 HPV HI-RISK TYP POOLED RSLT: CPT | Performed by: OBSTETRICS & GYNECOLOGY

## 2021-07-30 PROCEDURE — 99396 PR PREVENTIVE VISIT,EST,40-64: ICD-10-PCS | Mod: S$GLB,,, | Performed by: OBSTETRICS & GYNECOLOGY

## 2021-07-30 PROCEDURE — 3079F DIAST BP 80-89 MM HG: CPT | Mod: CPTII,S$GLB,, | Performed by: OBSTETRICS & GYNECOLOGY

## 2021-07-30 PROCEDURE — 3079F PR MOST RECENT DIASTOLIC BLOOD PRESSURE 80-89 MM HG: ICD-10-PCS | Mod: CPTII,S$GLB,, | Performed by: OBSTETRICS & GYNECOLOGY

## 2021-07-30 PROCEDURE — 99999 PR PBB SHADOW E&M-EST. PATIENT-LVL III: ICD-10-PCS | Mod: PBBFAC,,, | Performed by: OBSTETRICS & GYNECOLOGY

## 2021-07-30 PROCEDURE — 1159F PR MEDICATION LIST DOCUMENTED IN MEDICAL RECORD: ICD-10-PCS | Mod: CPTII,S$GLB,, | Performed by: OBSTETRICS & GYNECOLOGY

## 2021-07-30 PROCEDURE — 1159F MED LIST DOCD IN RCRD: CPT | Mod: CPTII,S$GLB,, | Performed by: OBSTETRICS & GYNECOLOGY

## 2021-07-30 PROCEDURE — 3008F PR BODY MASS INDEX (BMI) DOCUMENTED: ICD-10-PCS | Mod: CPTII,S$GLB,, | Performed by: OBSTETRICS & GYNECOLOGY

## 2021-07-30 PROCEDURE — 3074F PR MOST RECENT SYSTOLIC BLOOD PRESSURE < 130 MM HG: ICD-10-PCS | Mod: CPTII,S$GLB,, | Performed by: OBSTETRICS & GYNECOLOGY

## 2021-07-30 PROCEDURE — 3074F SYST BP LT 130 MM HG: CPT | Mod: CPTII,S$GLB,, | Performed by: OBSTETRICS & GYNECOLOGY

## 2021-07-30 PROCEDURE — 99999 PR PBB SHADOW E&M-EST. PATIENT-LVL III: CPT | Mod: PBBFAC,,, | Performed by: OBSTETRICS & GYNECOLOGY

## 2021-07-30 PROCEDURE — 99396 PREV VISIT EST AGE 40-64: CPT | Mod: S$GLB,,, | Performed by: OBSTETRICS & GYNECOLOGY

## 2021-07-30 PROCEDURE — 3008F BODY MASS INDEX DOCD: CPT | Mod: CPTII,S$GLB,, | Performed by: OBSTETRICS & GYNECOLOGY

## 2021-07-30 PROCEDURE — 1126F AMNT PAIN NOTED NONE PRSNT: CPT | Mod: CPTII,S$GLB,, | Performed by: OBSTETRICS & GYNECOLOGY

## 2021-07-30 PROCEDURE — 88175 CYTOPATH C/V AUTO FLUID REDO: CPT | Performed by: OBSTETRICS & GYNECOLOGY

## 2021-07-30 RX ORDER — SODIUM, POTASSIUM,MAG SULFATES 17.5-3.13G
1 SOLUTION, RECONSTITUTED, ORAL ORAL DAILY
Qty: 1 KIT | Refills: 0 | Status: SHIPPED | OUTPATIENT
Start: 2021-07-30 | End: 2021-08-01

## 2021-08-05 LAB
HPV HR 12 DNA SPEC QL NAA+PROBE: NEGATIVE
HPV16 AG SPEC QL: NEGATIVE
HPV18 DNA SPEC QL NAA+PROBE: NEGATIVE

## 2021-08-07 LAB
FINAL PATHOLOGIC DIAGNOSIS: NORMAL
Lab: NORMAL

## 2021-08-21 ENCOUNTER — HOSPITAL ENCOUNTER (OUTPATIENT)
Dept: RADIOLOGY | Facility: HOSPITAL | Age: 61
Discharge: HOME | End: 2021-08-21
Attending: INTERNAL MEDICINE
Payer: COMMERCIAL

## 2021-08-21 ENCOUNTER — LAB VISIT (OUTPATIENT)
Dept: LAB | Facility: HOSPITAL | Age: 61
End: 2021-08-21
Payer: COMMERCIAL

## 2021-08-21 VITALS — WEIGHT: 114 LBS | HEIGHT: 63 IN | BODY MASS INDEX: 20.2 KG/M2

## 2021-08-21 DIAGNOSIS — E03.9 ACQUIRED HYPOTHYROIDISM: ICD-10-CM

## 2021-08-21 DIAGNOSIS — Z12.31 SCREENING MAMMOGRAM, ENCOUNTER FOR: ICD-10-CM

## 2021-08-21 DIAGNOSIS — E78.2 MIXED HYPERLIPIDEMIA: ICD-10-CM

## 2021-08-21 LAB
CHOLEST SERPL-MCNC: 193 MG/DL (ref 120–199)
CHOLEST/HDLC SERPL: 1.9 {RATIO} (ref 2–5)
HDLC SERPL-MCNC: 103 MG/DL (ref 40–75)
HDLC SERPL: 53.4 % (ref 20–50)
LDLC SERPL CALC-MCNC: 78.8 MG/DL (ref 63–159)
NONHDLC SERPL-MCNC: 90 MG/DL
TRIGL SERPL-MCNC: 56 MG/DL (ref 30–150)
TSH SERPL DL<=0.005 MIU/L-ACNC: 3.26 UIU/ML (ref 0.4–4)

## 2021-08-21 PROCEDURE — 36415 COLL VENOUS BLD VENIPUNCTURE: CPT | Performed by: INTERNAL MEDICINE

## 2021-08-21 PROCEDURE — 77067 MAMMO DIGITAL SCREENING BILAT WITH TOMO: ICD-10-PCS | Mod: 26,,, | Performed by: RADIOLOGY

## 2021-08-21 PROCEDURE — 77063 BREAST TOMOSYNTHESIS BI: CPT | Mod: 26,,, | Performed by: RADIOLOGY

## 2021-08-21 PROCEDURE — 77063 MAMMO DIGITAL SCREENING BILAT WITH TOMO: ICD-10-PCS | Mod: 26,,, | Performed by: RADIOLOGY

## 2021-08-21 PROCEDURE — 84443 ASSAY THYROID STIM HORMONE: CPT | Performed by: INTERNAL MEDICINE

## 2021-08-21 PROCEDURE — 77067 SCR MAMMO BI INCL CAD: CPT | Mod: TC

## 2021-08-21 PROCEDURE — 80061 LIPID PANEL: CPT | Performed by: INTERNAL MEDICINE

## 2021-08-21 PROCEDURE — 77067 SCR MAMMO BI INCL CAD: CPT | Mod: 26,,, | Performed by: RADIOLOGY

## 2021-09-13 ENCOUNTER — TELEPHONE (OUTPATIENT)
Dept: ENDOSCOPY | Facility: HOSPITAL | Age: 61
End: 2021-09-13

## 2021-10-18 ENCOUNTER — TELEPHONE (OUTPATIENT)
Dept: ENDOSCOPY | Facility: HOSPITAL | Age: 61
End: 2021-10-18

## 2021-10-18 ENCOUNTER — PATIENT MESSAGE (OUTPATIENT)
Dept: ENDOSCOPY | Facility: HOSPITAL | Age: 61
End: 2021-10-18
Payer: COMMERCIAL

## 2021-11-17 ENCOUNTER — HOSPITAL ENCOUNTER (OUTPATIENT)
Facility: HOSPITAL | Age: 61
Discharge: HOME | End: 2021-11-17
Attending: STUDENT IN AN ORGANIZED HEALTH CARE EDUCATION/TRAINING PROGRAM | Admitting: STUDENT IN AN ORGANIZED HEALTH CARE EDUCATION/TRAINING PROGRAM
Payer: COMMERCIAL

## 2021-11-17 ENCOUNTER — ANESTHESIA EVENT (OUTPATIENT)
Dept: ENDOSCOPY | Facility: HOSPITAL | Age: 61
End: 2021-11-17
Payer: COMMERCIAL

## 2021-11-17 ENCOUNTER — ANESTHESIA (OUTPATIENT)
Dept: ENDOSCOPY | Facility: HOSPITAL | Age: 61
End: 2021-11-17
Payer: COMMERCIAL

## 2021-11-17 VITALS
TEMPERATURE: 98 F | OXYGEN SATURATION: 99 % | SYSTOLIC BLOOD PRESSURE: 132 MMHG | HEIGHT: 63 IN | WEIGHT: 111 LBS | RESPIRATION RATE: 14 BRPM | HEART RATE: 64 BPM | BODY MASS INDEX: 19.67 KG/M2 | DIASTOLIC BLOOD PRESSURE: 70 MMHG

## 2021-11-17 DIAGNOSIS — Z80.0 FAMILY HISTORY OF COLON CANCER: Primary | ICD-10-CM

## 2021-11-17 DIAGNOSIS — Z12.11 ENCOUNTER FOR SCREENING COLONOSCOPY: ICD-10-CM

## 2021-11-17 PROCEDURE — E9220 PRA ENDO ANESTHESIA: ICD-10-PCS | Mod: 33,,, | Performed by: NURSE ANESTHETIST, CERTIFIED REGISTERED

## 2021-11-17 PROCEDURE — 88305 TISSUE EXAM BY PATHOLOGIST: CPT | Performed by: PATHOLOGY

## 2021-11-17 PROCEDURE — E9220 PRA ENDO ANESTHESIA: HCPCS | Mod: 33,,, | Performed by: NURSE ANESTHETIST, CERTIFIED REGISTERED

## 2021-11-17 PROCEDURE — 45380 COLONOSCOPY AND BIOPSY: CPT | Mod: 33,,, | Performed by: STUDENT IN AN ORGANIZED HEALTH CARE EDUCATION/TRAINING PROGRAM

## 2021-11-17 PROCEDURE — 25000003 PHARM REV CODE 250: Performed by: NURSE ANESTHETIST, CERTIFIED REGISTERED

## 2021-11-17 PROCEDURE — 45380 COLONOSCOPY AND BIOPSY: CPT | Performed by: STUDENT IN AN ORGANIZED HEALTH CARE EDUCATION/TRAINING PROGRAM

## 2021-11-17 PROCEDURE — 27201012 HC FORCEPS, HOT/COLD, DISP: Performed by: STUDENT IN AN ORGANIZED HEALTH CARE EDUCATION/TRAINING PROGRAM

## 2021-11-17 PROCEDURE — 37000009 HC ANESTHESIA EA ADD 15 MINS: Performed by: STUDENT IN AN ORGANIZED HEALTH CARE EDUCATION/TRAINING PROGRAM

## 2021-11-17 PROCEDURE — 63600175 PHARM REV CODE 636 W HCPCS: Performed by: NURSE ANESTHETIST, CERTIFIED REGISTERED

## 2021-11-17 PROCEDURE — 88305 TISSUE EXAM BY PATHOLOGIST: CPT | Mod: 26,,, | Performed by: PATHOLOGY

## 2021-11-17 PROCEDURE — 88305 TISSUE EXAM BY PATHOLOGIST: ICD-10-PCS | Mod: 26,,, | Performed by: PATHOLOGY

## 2021-11-17 PROCEDURE — 45380 PR COLONOSCOPY,BIOPSY: ICD-10-PCS | Mod: 33,,, | Performed by: STUDENT IN AN ORGANIZED HEALTH CARE EDUCATION/TRAINING PROGRAM

## 2021-11-17 PROCEDURE — 37000008 HC ANESTHESIA 1ST 15 MINUTES: Performed by: STUDENT IN AN ORGANIZED HEALTH CARE EDUCATION/TRAINING PROGRAM

## 2021-11-17 RX ORDER — LIDOCAINE HYDROCHLORIDE 20 MG/ML
INJECTION, SOLUTION EPIDURAL; INFILTRATION; INTRACAUDAL; PERINEURAL
Status: DISCONTINUED | OUTPATIENT
Start: 2021-11-17 | End: 2021-11-17

## 2021-11-17 RX ORDER — PROPOFOL 10 MG/ML
VIAL (ML) INTRAVENOUS CONTINUOUS PRN
Status: DISCONTINUED | OUTPATIENT
Start: 2021-11-17 | End: 2021-11-17

## 2021-11-17 RX ORDER — PROPOFOL 10 MG/ML
VIAL (ML) INTRAVENOUS
Status: DISCONTINUED | OUTPATIENT
Start: 2021-11-17 | End: 2021-11-17

## 2021-11-17 RX ADMIN — SODIUM CHLORIDE: 0.9 INJECTION, SOLUTION INTRAVENOUS at 08:11

## 2021-11-17 RX ADMIN — PROPOFOL 40 MG: 10 INJECTION, EMULSION INTRAVENOUS at 08:11

## 2021-11-17 RX ADMIN — LIDOCAINE HYDROCHLORIDE 50 MG: 20 INJECTION, SOLUTION EPIDURAL; INFILTRATION; INTRACAUDAL; PERINEURAL at 08:11

## 2021-11-17 RX ADMIN — PROPOFOL 10 MG: 10 INJECTION, EMULSION INTRAVENOUS at 08:11

## 2021-11-17 RX ADMIN — Medication 150 MCG/KG/MIN: at 08:11

## 2021-11-24 LAB
FINAL PATHOLOGIC DIAGNOSIS: NORMAL
GROSS: NORMAL
Lab: NORMAL

## 2021-12-13 ENCOUNTER — IMMUNIZATION (OUTPATIENT)
Dept: PHARMACY | Facility: CLINIC | Age: 61
End: 2021-12-13
Payer: COMMERCIAL

## 2021-12-13 DIAGNOSIS — Z23 NEED FOR VACCINATION: Primary | ICD-10-CM

## 2021-12-15 ENCOUNTER — NURSE TRIAGE (OUTPATIENT)
Dept: ADMINISTRATIVE | Facility: CLINIC | Age: 61
End: 2021-12-15
Payer: COMMERCIAL

## 2022-04-28 ENCOUNTER — PATIENT MESSAGE (OUTPATIENT)
Dept: INTERNAL MEDICINE | Facility: CLINIC | Age: 62
End: 2022-04-28
Payer: COMMERCIAL

## 2022-05-03 ENCOUNTER — RESEARCH ENCOUNTER (OUTPATIENT)
Dept: RESEARCH | Facility: HOSPITAL | Age: 62
End: 2022-05-03
Payer: COMMERCIAL

## 2022-05-03 NOTE — PROGRESS NOTES
The Patient was called today regarding the patient's participation in (IRB #[id number] PI: Julia).   The Verbal Informed Consent was read and discussed by the consenter. The following was discussed:   Types of specimens to be collected   All medical information released to researchers will be stripped of identifiers and no patient information will be given to anyone outside of this research project.    Participating in a research study is not the same as getting regular medical care and will not improve the patient's health. The purpose of a research study is to gather information.  Being in this study does not interfere with your regular medical care.   The patient/LAR does not have to participate in this study. If they do not join, their care at [facility] will not be affected.  The person granting permission was provided adequate time to ask questions regarding the scope and purpose of the study.  Permission was obtained by telephone.   The above statements were read by the person obtaining permission to the person granting permission and witnessed by Jenny Sheridan, who also confirmed the patient's consent to the study. The witness information was documented on the verbal consent form as well.  This Verbal Informed Consent process was conducted prior to initiation of any study procedures.

## 2022-05-04 ENCOUNTER — OFFICE VISIT (OUTPATIENT)
Dept: INTERNAL MEDICINE | Facility: CLINIC | Age: 62
End: 2022-05-04
Payer: COMMERCIAL

## 2022-05-04 VITALS
SYSTOLIC BLOOD PRESSURE: 120 MMHG | HEIGHT: 63 IN | DIASTOLIC BLOOD PRESSURE: 80 MMHG | WEIGHT: 119 LBS | BODY MASS INDEX: 21.09 KG/M2

## 2022-05-04 DIAGNOSIS — K57.90 DIVERTICULOSIS: ICD-10-CM

## 2022-05-04 DIAGNOSIS — Z00.00 ANNUAL PHYSICAL EXAM: Primary | ICD-10-CM

## 2022-05-04 DIAGNOSIS — E78.2 MIXED HYPERLIPIDEMIA: ICD-10-CM

## 2022-05-04 DIAGNOSIS — K63.5 HYPERPLASTIC COLONIC POLYP, UNSPECIFIED PART OF COLON: ICD-10-CM

## 2022-05-04 DIAGNOSIS — E03.9 ACQUIRED HYPOTHYROIDISM: ICD-10-CM

## 2022-05-04 DIAGNOSIS — M85.89 OSTEOPENIA OF MULTIPLE SITES: ICD-10-CM

## 2022-05-04 DIAGNOSIS — F43.9 SITUATIONAL STRESS: ICD-10-CM

## 2022-05-04 PROCEDURE — 3074F SYST BP LT 130 MM HG: CPT | Mod: CPTII,S$GLB,, | Performed by: INTERNAL MEDICINE

## 2022-05-04 PROCEDURE — 99999 PR PBB SHADOW E&M-EST. PATIENT-LVL IV: CPT | Mod: PBBFAC,,, | Performed by: INTERNAL MEDICINE

## 2022-05-04 PROCEDURE — 1159F PR MEDICATION LIST DOCUMENTED IN MEDICAL RECORD: ICD-10-PCS | Mod: CPTII,S$GLB,, | Performed by: INTERNAL MEDICINE

## 2022-05-04 PROCEDURE — 99396 PR PREVENTIVE VISIT,EST,40-64: ICD-10-PCS | Mod: S$GLB,,, | Performed by: INTERNAL MEDICINE

## 2022-05-04 PROCEDURE — 1159F MED LIST DOCD IN RCRD: CPT | Mod: CPTII,S$GLB,, | Performed by: INTERNAL MEDICINE

## 2022-05-04 PROCEDURE — 3079F DIAST BP 80-89 MM HG: CPT | Mod: CPTII,S$GLB,, | Performed by: INTERNAL MEDICINE

## 2022-05-04 PROCEDURE — 3079F PR MOST RECENT DIASTOLIC BLOOD PRESSURE 80-89 MM HG: ICD-10-PCS | Mod: CPTII,S$GLB,, | Performed by: INTERNAL MEDICINE

## 2022-05-04 PROCEDURE — 3008F BODY MASS INDEX DOCD: CPT | Mod: CPTII,S$GLB,, | Performed by: INTERNAL MEDICINE

## 2022-05-04 PROCEDURE — 3074F PR MOST RECENT SYSTOLIC BLOOD PRESSURE < 130 MM HG: ICD-10-PCS | Mod: CPTII,S$GLB,, | Performed by: INTERNAL MEDICINE

## 2022-05-04 PROCEDURE — 99396 PREV VISIT EST AGE 40-64: CPT | Mod: S$GLB,,, | Performed by: INTERNAL MEDICINE

## 2022-05-04 PROCEDURE — 3008F PR BODY MASS INDEX (BMI) DOCUMENTED: ICD-10-PCS | Mod: CPTII,S$GLB,, | Performed by: INTERNAL MEDICINE

## 2022-05-04 PROCEDURE — 99999 PR PBB SHADOW E&M-EST. PATIENT-LVL IV: ICD-10-PCS | Mod: PBBFAC,,, | Performed by: INTERNAL MEDICINE

## 2022-05-04 RX ORDER — LANOLIN ALCOHOL/MO/W.PET/CERES
1000 CREAM (GRAM) TOPICAL DAILY
Qty: 30 TABLET | Refills: 12 | Status: SHIPPED | OUTPATIENT
Start: 2022-05-04 | End: 2023-10-02 | Stop reason: SDUPTHER

## 2022-05-04 RX ORDER — VIT C/E/ZN/COPPR/LUTEIN/ZEAXAN 250MG-90MG
2000 CAPSULE ORAL DAILY
Qty: 60 CAPSULE | Refills: 12 | Status: SHIPPED | OUTPATIENT
Start: 2022-05-04 | End: 2023-10-02

## 2022-05-04 RX ORDER — LEVOTHYROXINE SODIUM 88 UG/1
88 TABLET ORAL
Qty: 90 TABLET | Refills: 3 | Status: SHIPPED | OUTPATIENT
Start: 2022-05-04 | End: 2023-05-24

## 2022-05-04 NOTE — Clinical Note
Hi, she is interested in doing some family therapy (you take care of her  who has Parkinson's).   Anybody in particular that you recommend for this?  Thanks  Malena Pinto

## 2022-05-04 NOTE — PROGRESS NOTES
Subjective:       Patient ID: Ranjana Dominguez is a 61 y.o. female.    Chief Complaint: Annual Exam    Annual exam    Had outside labs done through her bariatric team.    Lipids reviewed, , HDL 97, LDL 95    PTH 88- had been 189 before.   Vitamin-D is 30, discussed vitamin-D in need for supplementation.    B12 366    She had a colonoscopy this year, adenoma was found, 3 year follow-up recommended.    Weight issues reviewed, BMI is now 21 which is better but I recommended that she try not to lose any weight and perhaps try to gain 5 lb.  She is exercising.  Bone density will be due next year.    Some situational stress, has been with Parkinson's; would like to see about some family therapy regarding this.    Patient Active Problem List:     Anxiety     Glucose intolerance (impaired glucose tolerance)     Mixed hyperlipidemia     Acquired hypothyroidism     Family history of colon cancer: father in his 60s     History of bariatric surgery: sleeve 2017     Colon polyp, hyperplastic 2018; adenoma 2021 repeat 3 years     Osteopenia of multiple sites: see DEXA 7/21     Diverticulosis: see colonoscopy 2021        Review of Systems   Constitutional: Negative for activity change and unexpected weight change.   HENT: Negative for hearing loss and trouble swallowing.    Eyes: Negative for discharge.   Respiratory: Negative for chest tightness and wheezing.    Cardiovascular: Negative for chest pain and palpitations.   Gastrointestinal: Negative for constipation, diarrhea and vomiting.   Genitourinary: Negative for difficulty urinating and hematuria.   Neurological: Negative for headaches.   Psychiatric/Behavioral: Negative for dysphoric mood.       Objective:      Physical Exam  Vitals and nursing note reviewed.   Constitutional:       Appearance: She is well-developed.   HENT:      Head: Normocephalic and atraumatic.      Right Ear: External ear normal.      Left Ear: External ear normal.      Nose: Nose  normal.      Mouth/Throat:      Pharynx: No oropharyngeal exudate.   Eyes:      General: No scleral icterus.     Extraocular Movements: Extraocular movements intact.      Conjunctiva/sclera: Conjunctivae normal.   Neck:      Thyroid: No thyromegaly.      Vascular: No JVD.   Cardiovascular:      Rate and Rhythm: Normal rate and regular rhythm.      Heart sounds: Normal heart sounds. No murmur heard.    No gallop.   Pulmonary:      Effort: Pulmonary effort is normal. No respiratory distress.      Breath sounds: Normal breath sounds. No wheezing.   Abdominal:      General: Bowel sounds are normal. There is no distension.      Palpations: Abdomen is soft. There is no mass.      Tenderness: There is no abdominal tenderness. There is no guarding or rebound.   Musculoskeletal:         General: No tenderness. Normal range of motion.      Cervical back: Normal range of motion and neck supple.   Lymphadenopathy:      Cervical: No cervical adenopathy.   Skin:     General: Skin is warm.      Findings: No erythema or rash.   Neurological:      General: No focal deficit present.      Mental Status: She is alert and oriented to person, place, and time.      Cranial Nerves: No cranial nerve deficit.      Coordination: Coordination normal.   Psychiatric:         Behavior: Behavior normal.         Thought Content: Thought content normal.         Judgment: Judgment normal.         Assessment:       1. Annual physical exam    2. Acquired hypothyroidism    3. Mixed hyperlipidemia    4. Hyperplastic colonic polyp, unspecified part of colon    5. Osteopenia of multiple sites: see DEXA 7/21    6. Diverticulosis: see colonoscopy 2021    7. Situational stress        Plan:         Ranjana was seen today for annual exam.    Diagnoses and all orders for this visit:    Annual physical exam    Acquired hypothyroidism; stable    Mixed hyperlipidemia; stable, continue regimen; will continue to monitor    Hyperplastic colonic polyp, unspecified  part of colon; adenoma 2021, repeat 3 years    Osteopenia of multiple sites: see DEXA 7/21; exercise and fall prevention; repeat 3 years    Diverticulosis: see colonoscopy 2021; alarm symptoms reviewed    Situational stress  -     Ambulatory referral/consult to Psychology; Future    Other orders  -     levothyroxine (SYNTHROID) 88 MCG tablet; Take 1 tablet (88 mcg total) by mouth before breakfast.  -     cholecalciferol, vitamin D3, (VITAMIN D3) 25 mcg (1,000 unit) capsule; Take 2 capsules (2,000 Units total) by mouth once daily.  -     cyanocobalamin (VITAMIN B-12) 1000 MCG tablet; Take 1 tablet (1,000 mcg total) by mouth once daily.    PTH reviewed, will continue to monitor, supplement vitamin-D  COVID booster recommended  Annual follow-up, return sooner with problems in the interim

## 2022-05-26 ENCOUNTER — OFFICE VISIT (OUTPATIENT)
Dept: URGENT CARE | Facility: CLINIC | Age: 62
End: 2022-05-26
Payer: COMMERCIAL

## 2022-05-26 VITALS
SYSTOLIC BLOOD PRESSURE: 159 MMHG | HEIGHT: 63 IN | WEIGHT: 150 LBS | TEMPERATURE: 98 F | HEART RATE: 71 BPM | DIASTOLIC BLOOD PRESSURE: 92 MMHG | BODY MASS INDEX: 26.58 KG/M2 | RESPIRATION RATE: 18 BRPM | OXYGEN SATURATION: 97 %

## 2022-05-26 DIAGNOSIS — S29.9XXA RIB INJURY: Primary | ICD-10-CM

## 2022-05-26 PROCEDURE — 1160F PR REVIEW ALL MEDS BY PRESCRIBER/CLIN PHARMACIST DOCUMENTED: ICD-10-PCS | Mod: CPTII,S$GLB,, | Performed by: STUDENT IN AN ORGANIZED HEALTH CARE EDUCATION/TRAINING PROGRAM

## 2022-05-26 PROCEDURE — 71101 X-RAY EXAM UNILAT RIBS/CHEST: CPT | Mod: FY,LT,S$GLB, | Performed by: RADIOLOGY

## 2022-05-26 PROCEDURE — 99213 PR OFFICE/OUTPT VISIT, EST, LEVL III, 20-29 MIN: ICD-10-PCS | Mod: S$GLB,,, | Performed by: STUDENT IN AN ORGANIZED HEALTH CARE EDUCATION/TRAINING PROGRAM

## 2022-05-26 PROCEDURE — 3080F DIAST BP >= 90 MM HG: CPT | Mod: CPTII,S$GLB,, | Performed by: STUDENT IN AN ORGANIZED HEALTH CARE EDUCATION/TRAINING PROGRAM

## 2022-05-26 PROCEDURE — 3008F PR BODY MASS INDEX (BMI) DOCUMENTED: ICD-10-PCS | Mod: CPTII,S$GLB,, | Performed by: STUDENT IN AN ORGANIZED HEALTH CARE EDUCATION/TRAINING PROGRAM

## 2022-05-26 PROCEDURE — 1159F MED LIST DOCD IN RCRD: CPT | Mod: CPTII,S$GLB,, | Performed by: STUDENT IN AN ORGANIZED HEALTH CARE EDUCATION/TRAINING PROGRAM

## 2022-05-26 PROCEDURE — 1160F RVW MEDS BY RX/DR IN RCRD: CPT | Mod: CPTII,S$GLB,, | Performed by: STUDENT IN AN ORGANIZED HEALTH CARE EDUCATION/TRAINING PROGRAM

## 2022-05-26 PROCEDURE — 3008F BODY MASS INDEX DOCD: CPT | Mod: CPTII,S$GLB,, | Performed by: STUDENT IN AN ORGANIZED HEALTH CARE EDUCATION/TRAINING PROGRAM

## 2022-05-26 PROCEDURE — 99213 OFFICE O/P EST LOW 20 MIN: CPT | Mod: S$GLB,,, | Performed by: STUDENT IN AN ORGANIZED HEALTH CARE EDUCATION/TRAINING PROGRAM

## 2022-05-26 PROCEDURE — 3077F PR MOST RECENT SYSTOLIC BLOOD PRESSURE >= 140 MM HG: ICD-10-PCS | Mod: CPTII,S$GLB,, | Performed by: STUDENT IN AN ORGANIZED HEALTH CARE EDUCATION/TRAINING PROGRAM

## 2022-05-26 PROCEDURE — 3077F SYST BP >= 140 MM HG: CPT | Mod: CPTII,S$GLB,, | Performed by: STUDENT IN AN ORGANIZED HEALTH CARE EDUCATION/TRAINING PROGRAM

## 2022-05-26 PROCEDURE — 71101 XR RIBS MIN 3 VIEWS W/ PA CHEST LEFT: ICD-10-PCS | Mod: FY,LT,S$GLB, | Performed by: RADIOLOGY

## 2022-05-26 PROCEDURE — 1159F PR MEDICATION LIST DOCUMENTED IN MEDICAL RECORD: ICD-10-PCS | Mod: CPTII,S$GLB,, | Performed by: STUDENT IN AN ORGANIZED HEALTH CARE EDUCATION/TRAINING PROGRAM

## 2022-05-26 PROCEDURE — 3080F PR MOST RECENT DIASTOLIC BLOOD PRESSURE >= 90 MM HG: ICD-10-PCS | Mod: CPTII,S$GLB,, | Performed by: STUDENT IN AN ORGANIZED HEALTH CARE EDUCATION/TRAINING PROGRAM

## 2022-05-26 NOTE — PROGRESS NOTES
"Subjective:       Patient ID: Ranjana Dominguez is a 61 y.o. female.    Vitals:  height is 5' 3" (1.6 m) and weight is 68 kg (150 lb). Her temperature is 98.2 °F (36.8 °C). Her blood pressure is 159/92 (abnormal) and her pulse is 71. Her respiration is 18 and oxygen saturation is 97%.     Chief Complaint: Rib Injury    Pt complains of pain left side rib pain from fall she took yesterday. Claimed she tripped over tv cord and hit the side of chest and rib area.       Respiratory: Negative for shortness of breath.    Skin: Negative for erythema.       Objective:      Physical Exam   Constitutional: She is oriented to person, place, and time. She does not appear ill. No distress.   HENT:   Head: Normocephalic.   Pulmonary/Chest: No respiratory distress. She exhibits tenderness.         Comments: Anterior left chest under breast.     Neurological: She is alert and oriented to person, place, and time. Coordination normal.   Skin: Skin is warm and dry. No bruising and No erythema   Psychiatric: Her behavior is normal. Mood normal.   Nursing note and vitals reviewed.        Assessment:       1. Rib injury        Narrative & Impression  EXAMINATION:  XR RIBS MIN 3 VIEWS W/ PA CHEST LEFT     CLINICAL HISTORY:  Unspecified injury of thorax, initial encounter     FINDINGS:  Ribs three views PA chest left: Heart size is normal.  Lungs are clear.  No pneumothorax, pleural fluid, or lung contusion seen.  No rib fracture or rib lesion seen.        Electronically signed by: Tim Turner MD  Date:                                            05/26/2022  Time:                                           11:03  Plan:         Rib injury  -     XR RIB LEFT W/ PA CHEST; Future; Expected date: 05/26/2022       Discussed xray findings with the patient. Supportive care. ED for severe symptoms. Otherwise, f/u with PCP if symptoms persist.             "

## 2022-07-22 ENCOUNTER — IMMUNIZATION (OUTPATIENT)
Dept: PHARMACY | Facility: CLINIC | Age: 62
End: 2022-07-22
Payer: COMMERCIAL

## 2022-07-22 DIAGNOSIS — Z23 NEED FOR VACCINATION: Primary | ICD-10-CM

## 2022-10-17 ENCOUNTER — OFFICE VISIT (OUTPATIENT)
Dept: ORTHOPEDICS | Facility: CLINIC | Age: 62
End: 2022-10-17
Payer: COMMERCIAL

## 2022-10-17 ENCOUNTER — HOSPITAL ENCOUNTER (OUTPATIENT)
Dept: RADIOLOGY | Facility: HOSPITAL | Age: 62
Discharge: HOME | End: 2022-10-17
Attending: ORTHOPAEDIC SURGERY
Payer: COMMERCIAL

## 2022-10-17 VITALS — HEIGHT: 63 IN | WEIGHT: 116.19 LBS | BODY MASS INDEX: 20.59 KG/M2

## 2022-10-17 DIAGNOSIS — M54.16 LUMBAR RADICULOPATHY: Primary | ICD-10-CM

## 2022-10-17 DIAGNOSIS — M51.36 DDD (DEGENERATIVE DISC DISEASE), LUMBAR: ICD-10-CM

## 2022-10-17 PROCEDURE — 72110 X-RAY EXAM L-2 SPINE 4/>VWS: CPT | Mod: TC

## 2022-10-17 PROCEDURE — 72110 XR LUMBAR SPINE AP AND LAT WITH FLEX/EXT: ICD-10-PCS | Mod: 26,,, | Performed by: RADIOLOGY

## 2022-10-17 PROCEDURE — 1159F MED LIST DOCD IN RCRD: CPT | Mod: CPTII,S$GLB,, | Performed by: ORTHOPAEDIC SURGERY

## 2022-10-17 PROCEDURE — 99204 OFFICE O/P NEW MOD 45 MIN: CPT | Mod: S$GLB,,, | Performed by: ORTHOPAEDIC SURGERY

## 2022-10-17 PROCEDURE — 99999 PR PBB SHADOW E&M-EST. PATIENT-LVL III: CPT | Mod: PBBFAC,,, | Performed by: ORTHOPAEDIC SURGERY

## 2022-10-17 PROCEDURE — 99999 PR PBB SHADOW E&M-EST. PATIENT-LVL III: ICD-10-PCS | Mod: PBBFAC,,, | Performed by: ORTHOPAEDIC SURGERY

## 2022-10-17 PROCEDURE — 72110 X-RAY EXAM L-2 SPINE 4/>VWS: CPT | Mod: 26,,, | Performed by: RADIOLOGY

## 2022-10-17 PROCEDURE — 99204 PR OFFICE/OUTPT VISIT, NEW, LEVL IV, 45-59 MIN: ICD-10-PCS | Mod: S$GLB,,, | Performed by: ORTHOPAEDIC SURGERY

## 2022-10-17 PROCEDURE — 1159F PR MEDICATION LIST DOCUMENTED IN MEDICAL RECORD: ICD-10-PCS | Mod: CPTII,S$GLB,, | Performed by: ORTHOPAEDIC SURGERY

## 2022-10-17 RX ORDER — METHYLPREDNISOLONE 4 MG/1
TABLET ORAL
Qty: 1 EACH | Refills: 0 | Status: SHIPPED | OUTPATIENT
Start: 2022-10-17 | End: 2022-11-07

## 2022-10-17 RX ORDER — GABAPENTIN 100 MG/1
100 CAPSULE ORAL 3 TIMES DAILY
Qty: 90 CAPSULE | Refills: 0 | Status: SHIPPED | OUTPATIENT
Start: 2022-10-17 | End: 2022-11-28

## 2022-10-17 NOTE — PROGRESS NOTES
DATE: 10/17/2022  PATIENT: Ranjana Dominguez    Supervising Physician: Fred Campos M.D.    CHIEF COMPLAINT: low back and bilateral leg pain     HISTORY:  Ranjana Dominguez is a 62 y.o. female here for initial evaluation of low back and bilateral leg pain (Back - 8, Leg - 8).  The pain in the back of both legs is what bothers her most.  The pain has been present for months, significantly worsening a few weeks ago when she was doing dead lifts/squats at the gym. The patient describes the pain as aching in the lower back and behind both legs.  The pain is worse with nothing and improved by nothing and is constant. There is positive associated numbness and tingling. There is negative subjective weakness. Prior treatments have included home exercises, but no ESIs or surgery.    The patient denies myelopathic symptoms such as handwriting changes or difficulty with buttons/coins/keys. Denies perineal paresthesias, bowel/bladder dysfunction.    PAST MEDICAL/SURGICAL HISTORY:  Past Medical History:   Diagnosis Date    Allergy     Anxiety     Colon polyp, hyperplastic: see colonoscopy 2018    Depression     Family history of colon cancer: father in his 60s 2016    Glucose intolerance (impaired glucose tolerance) 2012    Headache(784.0)     History of bariatric surgery 2018    Hypothyroidism     Memory loss     Menopause syndrome     Mitral valve disorders(424.0) 2012    Obesity     Other and unspecified hyperlipidemia     Sleep apnea: per sleep study 2015; CPAP at 11 cm recommended 2015    Special screening for malignant neoplasms, colon 2015    Thyroid disease      Past Surgical History:   Procedure Laterality Date    APPENDECTOMY  2015    BREAST CYST ASPIRATION       SECTION, CLASSIC      COLONOSCOPY N/A 2018    Procedure: COLONOSCOPY;  Surgeon: Hansel Shell MD;  Location: [facility] ([address] [place] [address]);  Service: Endoscopy;  Laterality:  N/A;    COLONOSCOPY N/A 11/17/2021    Procedure: COLONOSCOPY;  Surgeon: Martínez Amin MD;  Location: [facility] ([address] [place] [address]);  Service: Endoscopy;  Laterality: N/A;  8/10 - LVM about cancelling procedure- sm  pt completed COVID vaccine- see Immunization record in chart-rb    FRACTURE SURGERY      HERNIA REPAIR      umbilical hernia    NOSE SURGERY      SLEEVE GASTROPLASTY         Medications:   Current Outpatient Medications on File Prior to Visit   Medication Sig Dispense Refill    cholecalciferol, vitamin D3, (VITAMIN D3) 25 mcg (1,000 unit) capsule Take 2 capsules (2,000 Units total) by mouth once daily. 60 capsule 12    cyanocobalamin (VITAMIN B-12) 1000 MCG tablet Take 1 tablet (1,000 mcg total) by mouth once daily. 30 tablet 12    fluticasone (FLONASE) 50 mcg/actuation nasal spray SHAKE LIQUID AND USE 2 SPRAYS(100 MCG) IN EACH NOSTRIL EVERY DAY 48 mL 0    levothyroxine (SYNTHROID) 88 MCG tablet Take 1 tablet (88 mcg total) by mouth before breakfast. 90 tablet 3    lorazepam (ATIVAN) 0.5 MG tablet   2    sars-cov-2, covid-19, (MODERNA COVID-19) 50 mcg/0.25 ml injection (BOOSTER) Inject into the muscle. 0.25 mL 0    venlafaxine (EFFEXOR) 50 MG Tab TK 1 T PO  TID  2     Current Facility-Administered Medications on File Prior to Visit   Medication Dose Route Frequency Provider Last Rate Last Admin    dextrose 5 % and 0.45 % NaCl infusion   Intravenous Continuous Hansel Shell MD   New Bag at 08/13/18 0843    dextrose 5 % and 0.45 % NaCl infusion   Intravenous Continuous Hansel Shell MD 20 mL/hr at 08/13/18 0755 New Bag at 08/13/18 0755    sodium chloride 0.9% flush 3 mL  3 mL Intravenous PRN Hansel Shell MD           Social History:   Social History     Socioeconomic History    Marital status:     Number of children: 1   Occupational History    Occupation: [employer]   Tobacco Use    Smoking status: Former     Packs/day: 0.50     Years: 7.00     Pack years: 3.50     Types:  Cigarettes     Quit date: 1982     Years since quittin.9    Smokeless tobacco: Never   Substance and Sexual Activity    Alcohol use: Yes     Alcohol/week: 0.0 standard drinks     Types: 2 - 4 Glasses of wine per week     Comment: weekends    Drug use: No    Sexual activity: Yes     Partners: Male   Other Topics Concern    Are you pregnant or think you may be? No    Breast-feeding No     Social Determinants of Health     Financial Resource Strain: Low Risk     Difficulty of Paying Living Expenses: Not hard at all   Food Insecurity: No Food Insecurity    Worried About Running Out of Food in the Last Year: Never true    Ran Out of Food in the Last Year: Never true   Transportation Needs: No Transportation Needs    Lack of Transportation (Medical): No    Lack of Transportation (Non-Medical): No   Physical Activity: Insufficiently Active    Days of Exercise per Week: 4 days    Minutes of Exercise per Session: 30 min   Stress: Stress Concern Present    Feeling of Stress : To some extent   Social Connections: Unknown    Frequency of Communication with Friends and Family: More than three times a week    Frequency of Social Gatherings with Friends and Family: More than three times a week    Active Member of Clubs or Organizations: No    Attends Club or Organization Meetings: Never    Marital Status:    Housing Stability: Low Risk     Unable to Pay for Housing in the Last Year: No    Number of Places Lived in the Last Year: 1    Unstable Housing in the Last Year: No       REVIEW OF SYSTEMS:  Constitution: Negative. Negative for chills, fever and night sweats.   Cardiovascular: Negative for chest pain and syncope.   Respiratory: Negative for cough and shortness of breath.   Gastrointestinal: See HPI. Negative for nausea/vomiting. Negative for abdominal pain.  Genitourinary: See HPI. Negative for discoloration or dysuria.  Skin: Negative for dry skin, itching and rash.   Hematologic/Lymphatic: Negative for  "bleeding problem. Does not bruise/bleed easily.   Musculoskeletal: Negative for falls and muscle weakness.   Neurological: See HPI. No seizures.   Endocrine: Negative for polydipsia, polyphagia and polyuria.   Allergic/Immunologic: Negative for hives and persistent infections.     EXAM:  Ht 5' 3" (1.6 m)   Wt 52.7 kg (116 lb 2.9 oz)   LMP 07/09/2004   BMI 20.58 kg/m²     General: The patient is a very pleasant 62 y.o. female in no apparent distress, the patient is oriented to person, place and time.  Psych: Normal mood and affect  HEENT: Vision grossly intact, hearing intact to the spoken word.  Lungs: Respirations unlabored.  Gait: Antalgic station and gait, no difficulty with toe or heel walk.   Skin: Dorsal lumbar skin negative for rashes, lesions, hairy patches and surgical scars. There is negative lumbar tenderness to palpation.  Range of motion: Lumbar range of motion is acceptable.  Spinal Balance: Global saggital and coronal spinal balance acceptable, not significant for scoliosis and kyphosis.  Musculoskeletal: No pain with the range of motion of the bilateral hips. No trochanteric tenderness to palpation.  Vascular: Bilateral lower extremities warm and well perfused, dorsalis pedis pulses 2+ bilaterally.  Neurological: 4/5 strength and tone in all major motor groups in the bilateral lower extremities. Normal sensation to light touch in the L2-S1 dermatomes bilaterally.  Deep tendon reflexes symmetric 2+ in the bilateral lower extremities.  Negative Babinski bilaterally. Straight leg raise positive bilaterally.    IMAGING:      Today I personally reviewed AP, Lat and Flex/Ex  upright L-spine films that demonstrate significant degenerative changes without instability.      Body mass index is 20.58 kg/m².    Hemoglobin A1C   Date Value Ref Range Status   08/15/2019 5.0 4.0 - 5.6 % Final     Comment:     ADA Screening Guidelines:  5.7-6.4%  Consistent with prediabetes  >or=6.5%  Consistent with " diabetes  High levels of fetal hemoglobin interfere with the HbA1C  assay. Heterozygous hemoglobin variants (HbS, HgC, etc)do  not significantly interfere with this assay.   However, presence of multiple variants may affect accuracy.     07/02/2018 5.1 4.0 - 5.6 % Final     Comment:     ADA Screening Guidelines:  5.7-6.4%  Consistent with prediabetes  >or=6.5%  Consistent with diabetes  High levels of fetal hemoglobin interfere with the HbA1C  assay. Heterozygous hemoglobin variants (HbS, HgC, etc)do  not significantly interfere with this assay.   However, presence of multiple variants may affect accuracy.     05/13/2016 5.7 4.5 - 6.2 % Final           ASSESSMENT/PLAN:    Ranjana was seen today for low-back pain and leg pain.    Diagnoses and all orders for this visit:    Lumbar radiculopathy  -     Ambulatory referral/consult to Physical/Occupational Therapy; Future  -     MRI Lumbar Spine Without Contrast; Future    Other orders  -     methylPREDNISolone (MEDROL DOSEPACK) 4 mg tablet; use as directed  -     gabapentin (NEURONTIN) 100 MG capsule; Take 1 capsule (100 mg total) by mouth 3 (three) times daily.        Today we discussed at length all of the different treatment options including anti-inflammatories, acetaminophen, rest, ice, heat, physical therapy including strengthening and stretching exercises, home exercises, ROM, aerobic conditioning, aqua therapy, other modalities including ultrasound, massage, and dry needling, epidural steroid injections and finally surgical intervention.      Pt presents with chronic lumbar radiculopathy with neuro deficits. Concern for acute disc herniation. Will send medrol dose pack and gabapentin to pharmacy. Will obtain lumbar MRI to further evaluate and call with results.

## 2022-10-24 ENCOUNTER — TELEPHONE (OUTPATIENT)
Dept: PAIN MEDICINE | Facility: CLINIC | Age: 62
End: 2022-10-24
Payer: COMMERCIAL

## 2022-10-24 ENCOUNTER — HOSPITAL ENCOUNTER (OUTPATIENT)
Dept: RADIOLOGY | Facility: HOSPITAL | Age: 62
Discharge: HOME | End: 2022-10-24
Attending: ORTHOPAEDIC SURGERY
Payer: COMMERCIAL

## 2022-10-24 ENCOUNTER — OFFICE VISIT (OUTPATIENT)
Dept: ORTHOPEDICS | Facility: CLINIC | Age: 62
End: 2022-10-24
Payer: COMMERCIAL

## 2022-10-24 DIAGNOSIS — M89.9 BONE LESION: ICD-10-CM

## 2022-10-24 DIAGNOSIS — M54.16 LUMBAR RADICULOPATHY: Primary | ICD-10-CM

## 2022-10-24 DIAGNOSIS — M54.16 LUMBAR RADICULOPATHY: ICD-10-CM

## 2022-10-24 PROCEDURE — 99213 OFFICE O/P EST LOW 20 MIN: CPT | Mod: 95,,, | Performed by: ORTHOPAEDIC SURGERY

## 2022-10-24 PROCEDURE — 72148 MRI LUMBAR SPINE W/O DYE: CPT | Mod: TC

## 2022-10-24 PROCEDURE — 72148 MRI LUMBAR SPINE W/O DYE: CPT | Mod: 26,,, | Performed by: RADIOLOGY

## 2022-10-24 PROCEDURE — 72148 MRI LUMBAR SPINE WITHOUT CONTRAST: ICD-10-PCS | Mod: 26,,, | Performed by: RADIOLOGY

## 2022-10-24 PROCEDURE — 99213 PR OFFICE/OUTPT VISIT, EST, LEVL III, 20-29 MIN: ICD-10-PCS | Mod: 95,,, | Performed by: ORTHOPAEDIC SURGERY

## 2022-10-25 ENCOUNTER — PATIENT MESSAGE (OUTPATIENT)
Dept: ORTHOPEDICS | Facility: CLINIC | Age: 62
End: 2022-10-25
Payer: COMMERCIAL

## 2022-10-25 ENCOUNTER — CLINICAL SUPPORT (OUTPATIENT)
Dept: REHABILITATION | Facility: HOSPITAL | Age: 62
End: 2022-10-25
Payer: COMMERCIAL

## 2022-10-25 DIAGNOSIS — M54.16 LUMBAR RADICULOPATHY: ICD-10-CM

## 2022-10-25 PROCEDURE — 97161 PT EVAL LOW COMPLEX 20 MIN: CPT | Mod: PO

## 2022-10-25 NOTE — PLAN OF CARE
[facility]   Physical Therapy Initial Evaluation     Date: 10/25/2022   Name: Ranjana Kang Dominguez  Clinic Number: [id number]    Therapy Diagnosis:   Encounter Diagnosis   Name Primary?    Lumbar radiculopathy      Physician: Tahira Mcmullen,*    Physician Orders: PT Eval and Treat   Medical Diagnosis from Referral: M54.16 (ICD-10-CM) - Lumbar radiculopathy   Evaluation Date: 10/25/2022  Authorization Period Expiration: 3/25/2023  Plan of Care Expiration: 1/25/2023  Progress Note Due: 11/25/2022  Visit # / Visits authorized: 1/ pending   FOTO: 1/3    Precautions: Standard     Time In: 145 PM  Time Out: 220 PM  Total Appointment Time (timed & untimed codes): 35 minutes      SUBJECTIVE     Date of onset: 1 month ago    History of current condition - Ranjana reports: around 4 weeks ago she was doing exercises and she was performing squats and dead lifts she felt a different burning sensation throughout the hips and glutes that radiates down the back of the legs into the posterior calf areas. She had a party on the 22nd and the day after she could barely get out of bed and the knees were buckling that next day. At this time she is avoiding most general activity at this time. Her ADLs are off and on where some days she has challenges even getting to the bathroom on the days where the pain is bad. Going up and down steps and standing long periods of time are challenging. She feels like she is compensating with her gait and she is leaning to the side more often when her back is hurting her.     Falls: none    Imaging, : Impression:     1. Multilevel degenerative changes of the lumbar spine with most pronounced changes including severe central canal stenosis and severe left neural foraminal narrowing at L4-L5.  2. L5 vertebral body lesion with indeterminate appearance.  In the absence of prior studies, recommend follow-up MRI lumbar spine without contrast in 3-6 months.    Prior  Therapy: not for this condition  Social History: lives in 2 story home - does not have to use steps   Occupation: retired  Prior Level of Function: indep with all - exercising 3 days a week with PRE  Current Level of Function: has stopped exercises classes and has decreased general activity    Pain:  Current 4/10, worst 10/10, best 2/10   Location: bilateral gluteal region and down the posterior legs    Description: [employer], and Shooting  Aggravating Factors: Standing, Bending, and Walking  Easing Factors: rest    Patients goals: be able to control pain level and return to exercising without pain increasing     Medical History:   Past Medical History:   Diagnosis Date    Allergy     Anxiety     Colon polyp, hyperplastic: see colonoscopy 2018    Depression     Family history of colon cancer: father in his 60s 2016    Glucose intolerance (impaired glucose tolerance) 2012    Headache(784.0)     History of bariatric surgery 2018    Hypothyroidism     Lumbar radiculopathy 10/25/2022    Memory loss     Menopause syndrome     Mitral valve disorders(424.0) 2012    Obesity     Other and unspecified hyperlipidemia     Sleep apnea: per sleep study 2015; CPAP at 11 cm recommended 2015    Special screening for malignant neoplasms, colon 2015    Thyroid disease        Surgical History:   Ranjana Dominguez  has a past surgical history that includes Fracture surgery;  section, classic; Nose surgery; Appendectomy (2015); Hernia repair; Colonoscopy (N/A, 2018); Sleeve Gastroplasty; Breast cyst aspiration; and Colonoscopy (N/A, 2021).    Medications:   Ranjana has a current medication list which includes the following prescription(s): cholecalciferol (vitamin d3), cyanocobalamin, fluticasone propionate, gabapentin, levothyroxine, lorazepam, methylprednisolone, sars-cov-2 (covid-19), and venlafaxine, and the following Facility-Administered  Medications: dextrose 5 % and 0.45 % nacl, dextrose 5 % and 0.45 % nacl, and sodium chloride 0.9%.    Allergies:   Review of patient's allergies indicates:   Allergen Reactions    Decongestant tablet     Hydrocodone Itching          OBJECTIVE       Posture Alignment: flattening of the lumbar spine to prevent lumbar extension in standing and sitting    LUMBAR SPINE AROM:   Flexion: WFL   Extension: 25% with pain   Left Sidebend: 50%   Right Sidebend: 50%   Left Rotation: 50%   Right Rotation: 50%     SEGMENTAL MOBILITY: min restriction throughout the lumbar spine in UPA and rotational glides at grade 2-3 T10-L4    LOWER EXTREMITY STRENGTH:   Left Right   Knee Flex 4+/5 4+/5   Knee Ext 4+/5 4+/5     Hip Flexor 4+/5 4/5   Hip IR 4+/5 4/5   Hip ER 4+/5 4/5       Palpation: min tenderness over L>R side glute medius and piriformis muscles, L>R side quadratus lumborum and lumbar paraspinals    Flexibility: Min HS tightness bilaterally, quadratus lumborum tightness    Special Tests:   Left Right   Slump negative Lateral hip pain   SLR HS tightness HS tightness   BKFO negative negative     GAIT: Ranjana ambulates with no assistive device with bilateral hip external rotation and sig decrease in step and stride length bilaterally.       Pt/family was provided educational information, including: role of PT, goals for PT, scheduling - pt verbalized understanding. Discussed insurance limitations with pt.         Limitation/Restriction for FOTO  Survey    Therapist reviewed FOTO scores for Ranjana Dominguez on 10/25/2022.   FOTO documents entered into [employer] - see Media section.    Limitation Score: NP%         TREATMENT     Total Treatment time (time-based codes) separate from Evaluation: 00 minutes     Eval Only at this date      PATIENT EDUCATION AND HOME EXERCISES     Education provided:   - educated on etiology of condition, back pain management    Written Home Exercises Provided: yes. Exercises were reviewed  and Ranjana was able to demonstrate them prior to the end of the session.  Ranjana demonstrated good  understanding of the education provided. See EMR under Patient Instructions for exercises provided during therapy sessions.    ASSESSMENT     Ranjana is a 62 y.o. female referred to outpatient Physical Therapy with a medical diagnosis of M54.16 (ICD-10-CM) - Lumbar radiculopathy . Patient presents with L>R side lumbar spine stiffness with mod radicular symptoms present stemming from L4/5 central and lateral stenosis presence. She was fairly asymptomatic in the clinic at the time of evaluation but once she assumed a standing position, her gait changed and she presented with mod antalgic gait with varied sensation into bilateral Les. She will need more standing and functional core strengthening than table exercises as treatment progresses and while in supine her symptoms are minimal. She is very functional and she will benefit from     Patient prognosis is Good.   Patient will benefit from skilled outpatient Physical Therapy to address the deficits stated above and in the chart below, provide patient /family education, and to maximize patientt's level of independence.     Plan of care discussed with patient: Yes  Patient's spiritual, cultural and educational needs considered and patient is agreeable to the plan of care and goals as stated below:     Anticipated Barriers for therapy: pain limiting    Medical Necessity is demonstrated by the following  History  Co-morbidities and personal factors that may impact the plan of care Co-morbidities:   See HPI    Personal Factors:   no deficits     low   Examination  Body Structures and Functions, activity limitations and participation restrictions that may impact the plan of care Body Regions:   See [facility]    Body Systems:    gross symmetry  ROM  strength  balance  gait  transfers    Participation Restrictions:   Pain limiting    Activity limitations:   Learning and applying  knowledge  no deficits    General Tasks and Commands  no deficits    Communication  no deficits    Mobility  lifting and carrying objects  walking    Self care  dressing    Domestic Life  doing house work (cleaning house, washing dishes, laundry)    Interactions/Relationships  no deficits    Life Areas  no deficits    Community and Social Life  no deficits         low   Clinical Presentation stable and uncomplicated low   Decision Making/ Complexity Score: low     Goals:  Short Term Goals: 4 weeks   Patient will show 25% improvement in lumbar range of motion   2. Patient will show 1/2 grade MMT improvement in hip strength  3. Patient will show increased step and stride length bilaterally throughout gait cycle  4. Patient will comply with home exercise program at all times    Long Term Goals: 8 weeks   Patient will be able to climb and descend 2 flights of stairs without pain increasing.   2. Patent will be able to stand and walk for > 60 minutes without pain increasing  3. Patient will be able to return to 60 minutes of Progressive strengthening exercise without pain increasing  4. 50% FOTO score improvement    PLAN   Plan of care Certification: 10/25/2022 to 1/25/2023.    Outpatient Physical Therapy 2 times weekly for 8 weeks to include the following interventions: Gait Training, Manual Therapy, Neuromuscular Re-ed, Patient Education, Therapeutic Activities, and Therapeutic Exercise.     Svetlana Pa, PT      I CERTIFY THE NEED FOR THESE SERVICES FURNISHED UNDER THIS PLAN OF TREATMENT AND WHILE UNDER MY CARE   Physician's comments:     Physician's Signature: ___________________________________________________

## 2022-10-25 NOTE — PROGRESS NOTES
Established Patient - Audio Only Telehealth Visit     The patient location is: [facility]  The chief complaint leading to consultation is: MRI results  Visit type: Virtual visit with audio only (telephone)  Total time spent with patient: 10 min       The reason for the audio only service rather than synchronous audio and video virtual visit was related to technical difficulties or patient preference/necessity.     Each patient to whom I provide medical services by telemedicine is:  (1) informed of the relationship between the physician and patient and the respective role of any other health care provider with respect to management of the patient; and (2) notified that they may decline to receive medical services by telemedicine and may withdraw from such care at any time. Patient verbally consented to receive this service via voice-only telephone call.    DATE: 10/25/2022  PATIENT: Ranjana Dominguez    Attending Physician: Fred Campos MD    HISTORY:  Ranjana Dominguez is a 62 y.o. female who returns to me today for MRI results.  She was last seen by me 10/17/2022.  Today she is doing well but notes she continues to have significant pain down the back of both legs with numbness and tingling. She has tried a medrol dose pack, gabapentin, and home exercises with no relief. It is the [facility] spine conditioning program. Exercises include head rolls, kneeling back extension, sitting rotation stretch, modified seated side straddle, knee to chest, bird dog, plank, modified seated plank, hip bridges, abdominal bracing, and abdominal crunch. Pt completed each exercise daily for one hour for 6 weeks with worsening of pain. Pain is 8/10.      The Patient denies myelopathic symptoms such as handwriting changes or difficulty with buttons/coins/keys. Denies perineal paresthesias, bowel/bladder dysfunction.    PMH/PSH/FamHx/SocHx:  Unchanged from prior visit    ROS:  REVIEW OF SYSTEMS:  Constitution: Negative.  Negative for chills, fever and night sweats.   HENT: Negative for congestion and headaches.    Eyes: Negative for blurred vision, left vision loss and right vision loss.   Cardiovascular: Negative for chest pain and syncope.   Respiratory: Negative for cough and shortness of breath.    Endocrine: Negative for polydipsia, polyphagia and polyuria.   Hematologic/Lymphatic: Negative for bleeding problem. Does not bruise/bleed easily.   Skin: Negative for dry skin, itching and rash.   Musculoskeletal: Negative for falls and muscle weakness.   Gastrointestinal: Negative for abdominal pain and bowel incontinence.   Allergic/Immunologic: Negative for hives and persistent infections.  Genitourinary: Negative for urinary retention/incontinence and nocturia.   Neurological: negative for disturbances in coordination, no myelopathic symptoms such as handwriting changes or difficulty with buttons, coins, keys or small objects. No loss of balance and seizures.   Psychiatric/Behavioral: Negative for depression. The patient does not have insomnia.   Denies perineal paresthesias, bowel or bladder incontinence    EXAM:  LMP 07/09/2004     My physical examination was notable for the following findings:     Musculoskeletal and neuro exam stable.      IMAGING:    Today I personally re- reviewed AP, Lat and Flex/Ex  upright L-spine that demonstrate significant degenerative changes without instability.     MRI lumbar demonstrates multilevel degenerative changes of the lumbar spine with most pronounced changes including severe central canal stenosis and severe left neural foraminal narrowing at L4-L5.  L5 vertebral body lesion with indeterminate appearance.  In the absence of prior studies, recommend follow-up MRI lumbar spine without contrast in 3-6 months.    There is no height or weight on file to calculate BMI.    Hemoglobin A1C   Date Value Ref Range Status   08/15/2019 5.0 4.0 - 5.6 % Final     Comment:     ADA Screening  Guidelines:  5.7-6.4%  Consistent with prediabetes  >or=6.5%  Consistent with diabetes  High levels of fetal hemoglobin interfere with the HbA1C  assay. Heterozygous hemoglobin variants (HbS, HgC, etc)do  not significantly interfere with this assay.   However, presence of multiple variants may affect accuracy.     07/02/2018 5.1 4.0 - 5.6 % Final     Comment:     ADA Screening Guidelines:  5.7-6.4%  Consistent with prediabetes  >or=6.5%  Consistent with diabetes  High levels of fetal hemoglobin interfere with the HbA1C  assay. Heterozygous hemoglobin variants (HbS, HgC, etc)do  not significantly interfere with this assay.   However, presence of multiple variants may affect accuracy.     05/13/2016 5.7 4.5 - 6.2 % Final         ASSESSMENT/PLAN:    Diagnoses and all orders for this visit:    Lumbar radiculopathy  -     Procedure Order to Pain Management; Future        Today we discussed at length all of the different treatment options including anti-inflammatories, acetaminophen, rest, ice, heat, physical therapy including strengthening and stretching exercises, home exercises, ROM, aerobic conditioning, aqua therapy, other modalities including ultrasound, massage, and dry needling, epidural steroid injections and finally surgical intervention.      Pt presents with bilateral lumbar radiculopathy with neuro deficits. MRI shows large disc bulge at L4-5. Failure of conservative rx. Will order bilateral L4-5 TFESI with pain management. Pt will fu after injection. Will schedule repeat MRI in 4 months to monitor L5 vertebral body lesion.                            This service was not originating from a related E/M service provided within the previous 7 days nor will  to an E/M service or procedure within the next 24 hours or my soonest available appointment.  Prevailing standard of care was able to be met in this audio-only visit.

## 2022-11-09 ENCOUNTER — TELEPHONE (OUTPATIENT)
Dept: PAIN MEDICINE | Facility: CLINIC | Age: 62
End: 2022-11-09
Payer: COMMERCIAL

## 2022-11-09 NOTE — TELEPHONE ENCOUNTER
Staff spoke with the patient regarding their procedure with Dr. Shipman scheduled on 11/11/2022; the patient was provided the Arrival Information and scheduled time 8:30 AM.     The patient verbalized understanding.    Thank you,

## 2022-11-11 ENCOUNTER — HOSPITAL ENCOUNTER (OUTPATIENT)
Facility: HOSPITAL | Age: 62
Discharge: HOME | End: 2022-11-11
Attending: STUDENT IN AN ORGANIZED HEALTH CARE EDUCATION/TRAINING PROGRAM | Admitting: STUDENT IN AN ORGANIZED HEALTH CARE EDUCATION/TRAINING PROGRAM
Payer: COMMERCIAL

## 2022-11-11 VITALS
DIASTOLIC BLOOD PRESSURE: 80 MMHG | TEMPERATURE: 97 F | SYSTOLIC BLOOD PRESSURE: 123 MMHG | BODY MASS INDEX: 20.55 KG/M2 | HEART RATE: 60 BPM | WEIGHT: 116 LBS | OXYGEN SATURATION: 99 % | RESPIRATION RATE: 20 BRPM | HEIGHT: 63 IN

## 2022-11-11 DIAGNOSIS — M54.16 LUMBAR RADICULOPATHY: Primary | ICD-10-CM

## 2022-11-11 PROCEDURE — 94761 N-INVAS EAR/PLS OXIMETRY MLT: CPT

## 2022-11-11 PROCEDURE — 99900035 HC TECH TIME PER 15 MIN (STAT)

## 2022-11-11 PROCEDURE — 64483 NJX AA&/STRD TFRM EPI L/S 1: CPT | Mod: 50,,, | Performed by: STUDENT IN AN ORGANIZED HEALTH CARE EDUCATION/TRAINING PROGRAM

## 2022-11-11 PROCEDURE — 25000003 PHARM REV CODE 250: Performed by: STUDENT IN AN ORGANIZED HEALTH CARE EDUCATION/TRAINING PROGRAM

## 2022-11-11 PROCEDURE — 64483 NJX AA&/STRD TFRM EPI L/S 1: CPT | Mod: 50 | Performed by: STUDENT IN AN ORGANIZED HEALTH CARE EDUCATION/TRAINING PROGRAM

## 2022-11-11 PROCEDURE — 25500020 PHARM REV CODE 255: Performed by: STUDENT IN AN ORGANIZED HEALTH CARE EDUCATION/TRAINING PROGRAM

## 2022-11-11 PROCEDURE — 64483 PR EPIDURAL INJ, ANES/STEROID, TRANSFORAMINAL, LUMB/SACR, SNGL LEVL: ICD-10-PCS | Mod: 50,,, | Performed by: STUDENT IN AN ORGANIZED HEALTH CARE EDUCATION/TRAINING PROGRAM

## 2022-11-11 PROCEDURE — 63600175 PHARM REV CODE 636 W HCPCS: Performed by: STUDENT IN AN ORGANIZED HEALTH CARE EDUCATION/TRAINING PROGRAM

## 2022-11-11 PROCEDURE — 99152 MOD SED SAME PHYS/QHP 5/>YRS: CPT | Performed by: STUDENT IN AN ORGANIZED HEALTH CARE EDUCATION/TRAINING PROGRAM

## 2022-11-11 RX ORDER — NAPROXEN 500 MG/1
500 TABLET ORAL 2 TIMES DAILY PRN
COMMUNITY

## 2022-11-11 RX ORDER — DEXAMETHASONE SODIUM PHOSPHATE 4 MG/ML
INJECTION, SOLUTION INTRA-ARTICULAR; INTRALESIONAL; INTRAMUSCULAR; INTRAVENOUS; SOFT TISSUE
Status: DISCONTINUED | OUTPATIENT
Start: 2022-11-11 | End: 2022-11-11 | Stop reason: HOSPADM

## 2022-11-11 RX ORDER — LIDOCAINE HYDROCHLORIDE 10 MG/ML
INJECTION, SOLUTION EPIDURAL; INFILTRATION; INTRACAUDAL; PERINEURAL
Status: DISCONTINUED | OUTPATIENT
Start: 2022-11-11 | End: 2022-11-11 | Stop reason: HOSPADM

## 2022-11-11 RX ORDER — SODIUM CHLORIDE 9 MG/ML
500 INJECTION, SOLUTION INTRAVENOUS CONTINUOUS
Status: ACTIVE | OUTPATIENT
Start: 2022-11-11 | End: 2022-11-12

## 2022-11-11 RX ORDER — MIDAZOLAM HYDROCHLORIDE 1 MG/ML
2 INJECTION INTRAMUSCULAR; INTRAVENOUS ONCE AS NEEDED
Status: COMPLETED | OUTPATIENT
Start: 2022-11-11 | End: 2022-11-11

## 2022-11-11 RX ORDER — FENTANYL CITRATE 50 UG/ML
25 INJECTION, SOLUTION INTRAMUSCULAR; INTRAVENOUS ONCE AS NEEDED
Status: COMPLETED | OUTPATIENT
Start: 2022-11-11 | End: 2022-11-11

## 2022-11-11 RX ORDER — CLONAZEPAM 0.5 MG/1
0.5 TABLET ORAL DAILY
COMMUNITY
End: 2023-10-02 | Stop reason: SDUPTHER

## 2022-11-11 RX ADMIN — SODIUM CHLORIDE 500 ML: 0.9 INJECTION, SOLUTION INTRAVENOUS at 10:11

## 2022-11-11 NOTE — PLAN OF CARE
VSS. Pt able to tolerate oral liquids. Pt denies c/o pain. No distress noted. Pt states she is ready for D/C. D/C instructions reviewed with pt and family, verbalized understanding.

## 2022-11-11 NOTE — PATIENT INSTRUCTIONS
[facility]  [facility]  [employer] service # [phone]    POST-PROCEDURE INSTRUCTIONS:    Today you had an injection that included a steroid medications.  The steroid may or may not have been mixed with a local anesthetic when it was injected.   If the injection was in the neck, you may feel some pressure, numbness, or slight weakness in the arm after the procedure for a short period of time (this is a normal response), if this persists for longer than 1 day please contact our office or go to the emergency room.  If the injection was in the low back, you may feel some pressure, numbness, or slight weakness in the leg after the procedure for a short period of time (this is a normal response), if this persists for longer than 1 day please contact our office or go to the emergency room.  You may get side effects from the steroid.  This is not uncommon.  Symptoms include: elevated blood sugar, elevated blood pressure, headache, flushing, nausea, insomnia.  These symptoms are transient and will resolve within 1-3 days.  If symptoms last longer than this please contact our office or head to the emergency room.  Steroid medications can take anywhere from 3-14 days to take effect (rarely longer).  You may notice that your pain worsens for a short period of time after the injection, this would not be unusual due to the pressure and trauma from the needle.    If you do not have a follow up appointment scheduled, please contact my office (or the office of the physician who referred you for the procedure) to get a post-procedure follow up scheduled 2-4 weeks after the procedure.  This can be done as a virtual visit if that is more convenient for you.      What you need to do:    Keep a record of your response to the injection you had today.    How much relief did you get?   When did the relief start and how long did it last?  Were you able to decrease the use of any of your pain  medications?  Were you able to increase your level of activity?  How long did the relief last?    What to watch out for:    If you experience any of the following symptoms after your procedure, please notify the messaging service immediately (see above for contact information):   fever (increased oral temperature)   bleeding or swelling at the injection site,    drainage, rash or redness at the injection site    possible signs of infection    increased pain at the injection site   worsening of your usual pain   severe headache   new or worsening numbness    new arm and/or leg weakness, or    changes in bowel and/or bladder function: urinating or defecating on yourself and not knowing that you did it.    PLEASE FOLLOW ALL INSTRUCTIONS CAREFULLY     Do not engage in strenuous activity (e.g., lifting or pushing heavy objects or repeated bending) for 24 hours.     Do not take a bath, swim or use Jacuzzi for 24 hours after procedure. (A shower is fine).   Remove any Band-Aids when you get home.    Use cold/ice, as needed for comfort.  We recommend the use of cold therapy alternating on for 20 minutes, off for 20 minutes.    Do not apply direct heat (heating pad or heat packs) to the injection site for 24 hours.     Resume your usual medications, unless instructed otherwise by your Pain Physician.     If you are on warfarin (Coumadin) or other blood thinner, resume this medication as instructed by your prescribing Physician.    IF AT ANY POINT YOU ARE VERY CONCERNED ABOUT YOUR SYMPTOMS, PLEASE GO TO THE EMERGENCY ROOM.    If you develop worsening pain, weakness, numbness, lose bowel or bladder control (i.e., having an accident where you did not even know you had to go to the bathroom and suddenly noticed you soiled yourself), saddle anesthesia (a loss of sensation restricted to the area of the buttocks, anus and between the legs -- i.e., those parts of your body that would touch a saddle if you were sitting on one) you  need to go immediately to the emergency department for evaluation and treatment.    ----------------------------------------------------------------------------------------------------------------------------------------------------------------  If you received Sedation please read the following instructions:  POST SEDATION INSTRUCTIONS    Today you received intravenous medication (also known as sedation) that was used to help you relax and/or decrease discomfort during your procedure. This medication will be acting in your body for the next 24 hours, so you might feel a little tired or sleepy. This feeling will slowly wear off.   Common side effects associated with these medications include: drowsiness, dizziness, sleepiness, confusion, feeling excited, difficulty remembering things, lack of steadiness with walking or balance, loss of fine muscle control, slowed reflexes, difficulty focusing, and blurred vision.  Some over-the-counter and prescription medications (e.g., muscle relaxants, opioids, mood-altering medications, sedatives/hypnotics, antihistamines) can interact with the intravenous medication you received and cause an increased risk of the side effects listed above in addition to other potentially life threatening side effects. Use extreme caution if you are taking such medications, and consult with your Pain Physician or prescribing physician if you have any questions.  For the next 12-24 hours:    DO NOT--Drive a car, operate machinery or power tools   DO NOT--Drink any alcoholic beverages (not even beer), they may dangerously increase the risk of side effects.    DO NOT--Make any important legal or business decisions or sign important documents.  We advise you to have someone to assist you at home. Move slowly and carefully. Do not make sudden changes in position. Be aware of dizziness or light-headedness and move accordingly.   If you seek medical treatment within 24 hours, let the nurse or doctor  caring for you know that you have received the above medications. If you have any questions or concerns related to your sedation or treatment today please contact us.

## 2022-11-11 NOTE — H&P
HPI  Patient presenting for Procedure(s) (LRB):  TFESI (B/L) L4/5 (Bilateral)     Patient on Anti-coagulation No    No health changes since previous encounter    Past Medical History:   Diagnosis Date    Allergy     Anxiety     Colon polyp, hyperplastic: see colonoscopy 2018    Depression     Family history of colon cancer: father in his 60s 2016    Glucose intolerance (impaired glucose tolerance) 2012    Headache(784.0)     History of bariatric surgery 2018    Hypothyroidism     Lumbar radiculopathy 10/25/2022    Memory loss     Menopause syndrome     Mitral valve disorders(424.0) 2012    Obesity     Other and unspecified hyperlipidemia     Sleep apnea: per sleep study 2015; CPAP at 11 cm recommended 2015    Special screening for malignant neoplasms, colon 2015    Thyroid disease      Past Surgical History:   Procedure Laterality Date    APPENDECTOMY  2015    BREAST CYST ASPIRATION       SECTION, CLASSIC      COLONOSCOPY N/A 2018    Procedure: COLONOSCOPY;  Surgeon: Hansel Shell MD;  [facility]  Service: Endoscopy;  Laterality: N/A;    COLONOSCOPY N/A 2021    Procedure: COLONOSCOPY;  Surgeon: Martínez Amin MD;  [facility]  Service: Endoscopy;  Laterality: N/A;  8/10 - LVM about cancelling procedure- sm  pt completed COVID vaccine- see Immunization record in chart-rb    FRACTURE SURGERY      HERNIA REPAIR      umbilical hernia    NOSE SURGERY      SLEEVE GASTROPLASTY       Review of patient's allergies indicates:   Allergen Reactions    Decongestant tablet     Hydrocodone Itching      No current facility-administered medications for this encounter.     Facility-Administered Medications Ordered in Other Encounters   Medication    dextrose 5 % and 0.45 % NaCl infusion    dextrose 5 % and 0.45 % NaCl infusion    sodium chloride 0.9% flush 3 mL       PMHx, PSHx, Allergies, Medications reviewed in  epic    ROS negative except pain complaints in HPI    OBJECTIVE:    LMP 07/09/2004   Breastfeeding No     PHYSICAL EXAMINATION:    GENERAL: Well appearing, in no acute distress, alert and oriented x3.  PSYCH:  Mood and affect appropriate.  SKIN: Skin color, texture, turgor normal, no rashes or lesions which will impact the procedure.  CV: RRR with palpation of the radial artery.  PULM: No evidence of respiratory difficulty, symmetric chest rise. Clear to auscultation.  NEURO: Cranial nerves grossly intact.    Plan:    Proceed with procedure as planned Procedure(s) (LRB):  TFESI (B/L) L4/5 (Bilateral)    Jorge Rowe  11/11/2022

## 2022-11-11 NOTE — OP NOTE
"PROCEDURE: bilateral Lumbar L4-5 Transforaminal Epidural Steroid Injection    Patient Name: Ranjana Dominguez  MRN: [id number]    PROCEDURE DATE: 11/11/2022    INJECTION # 1    DIAGNOSIS: Lumbar Radiculopathy  CPT CODE: 43730 (INJECTION(S), ANESTHETIC AGENT(S) AND/OR STEROID; TRANSFORAMINAL EPIDURAL, WITH IMAGING GUIDANCE (FLUOROSCOPY OR CT), LUMBAR OR SACRAL, SINGLE LEVEL), 31512 (Each additional level).     POSTPROCEDURE DIAGNOSIS: Same    PHYSICIAN: Jorge Shipman DO  NEEDLE TYPE: - 22G 5" Spinal Needle  MEDICATIONS INJECTED: 4ml mixture of 2.5ml Dexamethasone 4mg/ml and 1.5ml 1% lidocaine split equally between each site.  CONTRAST: Omni 300    Sedation Medications - Mild Sedation with 2mg Versed and 25mcg Fentanyl    Estimated Blood Loss - <2ml  Drains: None  Specimens Removed: None  Urine Output - Not Measured  Complications: None  Outcome: Good    Informed Consent:  The patient's condition and proposed procedures, risks, and alternatives were discussed with the patient or responsible party.  The patient's / responsible party's questions were answered.   The patient / responsible party appeared to understand and chose to proceed.  Informed consent was obtained.  After obtaining written consent, an IV hep lock was placed. (See nurses notes for details).     Procedure in Detail:  The patient was taken back to the OR suite and placed in a prone position. The skin overlying the injection site was prepped and draped in an aseptic fashion. The target injection site (see above) was identified with fluoroscopy.     Procedural Pause:  A procedural pause verifying correct patient, medical record number, allergies, medications to be administered, current vital signs, and surgical site was performed immediately prior to beginning the procedure.    The skin and subcutaneous tissue overlying the target site(s) of injection for the L4-5 transforaminal epidural steroid injection was/were anesthetized using " 4 mL of 1% lidocaine with a 25-gauge, 1½-inch needle.      The fluoroscopic beam was aligned to create a tunnel view.  The above noted needle was advanced parallel to the fluoroscopic beam towards the above noted foramen under fluoroscopic guidance.  The final position of the needle(s) was identified using AP and lateral views.  Paresthesias were not noted with final needle positioning.       A microbore extension tubing was attached to the needle to minimize any movement of the needle during injection or syringe change.  After negative aspiration for heme or CSF at each site(s) where the needle(s) was placed, 1ml of contrast dye was injected to confirm appropriate placement and that there was no vascular uptake.  Pain provocation by the injected contrast material was noted.  Then the injectate solution described above was injected in increments.  The needle was then retracted approximately prison and the needle track was flushed with 0.5 mL of Lidocaine 1%.  The needle(s) was then removed.     The same procedural technique outlined above was repeated on the OPPOSITE side.    The heart rate, pulse oximetry, and blood pressure were continuously monitored throughout the procedure.  The procedure was well tolerated. She was carefully escorted to the recovery room in stable condition. Patient was monitored by RN for recovery period.  The patient will be contacted in the next few days to determine extent of relief.  Patient was given post procedure and discharge instructions to follow at home.  The patient was discharged in a stable condition.    Note Electronically Signed By:  Jorge Rowe  11/11/2022

## 2022-11-11 NOTE — PLAN OF CARE
Pre-op complete. Pt calm, will continue to monitor. Pt's spouse at bedside, states will keep belongings.

## 2022-11-11 NOTE — PLAN OF CARE
Dr Zaman at bedside. Made aware that pt is taking naproxen and has bruise to left eye from hitting head while getting into car 2 to 3 weeks ago. Ok to continue with planned procedure.

## 2022-11-11 NOTE — DISCHARGE SUMMARY
[facility] ([facility])  Discharge Note  Short Stay    Procedure(s) (LRB):  TFESI (B/L) L4/5 (Bilateral)      OUTCOME: Patient tolerated treatment/procedure well without complication and is now ready for discharge.    DISPOSITION: Home or Self Care    FINAL DIAGNOSIS:  <principal problem not specified>    FOLLOWUP: In clinic    DISCHARGE INSTRUCTIONS:  No discharge procedures on file.     TIME SPENT ON DISCHARGE: 10 minutes

## 2022-11-14 ENCOUNTER — CLINICAL SUPPORT (OUTPATIENT)
Dept: REHABILITATION | Facility: HOSPITAL | Age: 62
End: 2022-11-14
Payer: COMMERCIAL

## 2022-11-14 DIAGNOSIS — M54.16 LUMBAR RADICULOPATHY: Primary | ICD-10-CM

## 2022-11-14 PROCEDURE — 97110 THERAPEUTIC EXERCISES: CPT | Mod: PO,CQ

## 2022-11-14 NOTE — PROGRESS NOTES
"[facility]   Physical Therapy Treatment Note     Name: Ranjana Dominguez  Clinic Number: [id number]    Therapy Diagnosis:   Encounter Diagnosis   Name Primary?    Lumbar radiculopathy Yes     Physician: Tahira Mcmullen,*    Visit Date: 11/14/2022    Physician Orders: PT Eval and Treat   Medical Diagnosis from Referral: M54.16 (ICD-10-CM) - Lumbar radiculopathy   Evaluation Date: 10/25/2022  Authorization Period Expiration: 3/25/2023  Plan of Care Expiration: 1/25/2023  Progress Note Due: 11/25/2022  Visit # / Visits authorized: 1/20  FOTO: 1/3     Precautions: Standard     PTA Visit #: 1/5     Time In: 12:45 pm  Time Out: 1:30 pm  Total Billable Time: 45 minutes    SUBJECTIVE     Pt reports: she has the most pain when she tries to extend her back  She was not provided with home exercise program at initial evaluation.  Response to previous treatment: initial evaluation  Functional change: ongoing    Pain: 2/10  Location: bilateral low back and lower extremities      OBJECTIVE     Objective Measures updated at progress report unless specified.     Treatment     Ranjana received the treatments listed below:      therapeutic exercises to develop strength, endurance, ROM, flexibility, and core stabilization for 45 minutes including:  PPT 20x5"  Supine active hamstring stretch 3x30"  Piriformis stretch 3x30"  Bridges with RTB around knees 2x10  LTR 10x5"  Side lying clamshells with YTB 20x2"        Patient Education and Home Exercises     Home Exercises Provided and Patient Education Provided     Education provided:   - HEP  - Core activation with transitional movements    Written Home Exercises Provided: yes. Exercises were reviewed and Ranjana was able to demonstrate them prior to the end of the session.  Ranjana demonstrated good  understanding of the education provided. See EMR under Patient Instructions for exercises provided during therapy sessions    ASSESSMENT "     Ranjana presents to treatment slightly fearful of increasing her pain as she has never done physical therapy before and does not know what to expect. She was instructed on exercises and was able to perform with no increase in pain. She was educated on core activation during transitional movements and exercises. Continue to progress as tolerated.     Ranjana Is progressing well towards her goals.   Pt prognosis is Good.     Pt will continue to benefit from skilled outpatient physical therapy to address the deficits listed in the problem list box on initial evaluation, provide pt/family education and to maximize pt's level of independence in the home and community environment.     Pt's spiritual, cultural and educational needs considered and pt agreeable to plan of care and goals.     Anticipated Barriers for therapy: pain limiting     Goals:  Short Term Goals: 4 weeks   Patient will show 25% improvement in lumbar range of motion   2. Patient will show 1/2 grade MMT improvement in hip strength  3. Patient will show increased step and stride length bilaterally throughout gait cycle  4. Patient will comply with home exercise program at all times     Long Term Goals: 8 weeks   Patient will be able to climb and descend 2 flights of stairs without pain increasing.   2. Patent will be able to stand and walk for > 60 minutes without pain increasing  3. Patient will be able to return to 60 minutes of Progressive strengthening exercise without pain increasing  4. 50% FOTO score improvement       PLAN     Continue to progress per PT POC    Devora Murphy PTA

## 2022-11-16 ENCOUNTER — CLINICAL SUPPORT (OUTPATIENT)
Dept: REHABILITATION | Facility: HOSPITAL | Age: 62
End: 2022-11-16
Payer: COMMERCIAL

## 2022-11-16 DIAGNOSIS — M54.16 LUMBAR RADICULOPATHY: Primary | ICD-10-CM

## 2022-11-16 DIAGNOSIS — Z74.09 DECREASED MOBILITY AND ENDURANCE: ICD-10-CM

## 2022-11-16 DIAGNOSIS — R53.1 WEAKNESS: ICD-10-CM

## 2022-11-16 PROCEDURE — 97110 THERAPEUTIC EXERCISES: CPT | Mod: PO

## 2022-11-16 NOTE — PROGRESS NOTES
"[facility]   Physical Therapy Treatment Note     Name: Ranjana Duquebert  Clinic Number: [id number]    Therapy Diagnosis:   Encounter Diagnoses   Name Primary?    Lumbar radiculopathy Yes    Weakness     Decreased mobility and endurance      Physician: Tahira Mcmullen,*    Visit Date: 11/16/2022    Physician Orders: PT Eval and Treat   Medical Diagnosis from Referral: M54.16 (ICD-10-CM) - Lumbar radiculopathy   Evaluation Date: 10/25/2022  Authorization Period Expiration: 3/25/2023  Plan of Care Expiration: 1/25/2023  Progress Note Due: 11/25/2022  Visit # / Visits authorized: 2/20  FOTO: 1/3     Precautions: Standard      PTA Visit #: 1/5      Time In: 330 pm  Time Out: 415 pm  Total Billable Time: 45 minutes     SUBJECTIVE      Pt reports: she reports no new complaints and is agreeable to today's treatment.   She was not provided with home exercise program at initial evaluation.  Response to previous treatment: initial evaluation  Functional change: ongoing     Pain: 2/10  Location: bilateral low back and lower extremities       OBJECTIVE      Objective Measures updated at progress report unless specified.      Treatment      Ranjana received the treatments listed below:       therapeutic exercises to develop strength, endurance, ROM, flexibility, and core stabilization for 45 minutes including:  PPT 10x5"  TrA Marching and LE Ext 2x5x3" ea  Supine Anti-Rotation Isometric 15x3"  Bridges with RTB around knees 2x10  LTR 10x5"  Side lying and Supine Unilateral clamshells with PPT  YTB 15x2" ea  Paloff Press 2x5x3" RTB  TB Rows/Pull Downs with TrA 2x10 RTB           Patient Education and Home Exercises      Home Exercises Provided and Patient Education Provided      Education provided:   - HEP  - Core activation with transitional movements     Written Home Exercises Provided: yes. Exercises were reviewed and Ranjana was able to demonstrate them prior to the end of the " session.  Ranjana demonstrated good  understanding of the education provided. See EMR under Patient Instructions for exercises provided during therapy sessions     ASSESSMENT      Ranjana tolerated treatment well without sxs exacerbation. She continues to benefit form core bracing exercises without increase in pain. She needed some cueing for proper TrA bracing but overall displayed good performance of today's programming. Patient is progressing nicely with skilled PT.      Ranjana Is progressing well towards her goals.   Pt prognosis is Good.      Pt will continue to benefit from skilled outpatient physical therapy to address the deficits listed in the problem list box on initial evaluation, provide pt/family education and to maximize pt's level of independence in the home and community environment.      Pt's spiritual, cultural and educational needs considered and pt agreeable to plan of care and goals.     Anticipated Barriers for therapy: pain limiting      Goals:  Short Term Goals: 4 weeks   Patient will show 25% improvement in lumbar range of motion   2. Patient will show 1/2 grade MMT improvement in hip strength  3. Patient will show increased step and stride length bilaterally throughout gait cycle  4. Patient will comply with home exercise program at all times     Long Term Goals: 8 weeks   Patient will be able to climb and descend 2 flights of stairs without pain increasing.   2. Patent will be able to stand and walk for > 60 minutes without pain increasing  3. Patient will be able to return to 60 minutes of Progressive strengthening exercise without pain increasing  4. 50% FOTO score improvement        PLAN     Continue to progress per patient tolerance     Blair Abbasi, PT

## 2022-11-21 ENCOUNTER — CLINICAL SUPPORT (OUTPATIENT)
Dept: REHABILITATION | Facility: HOSPITAL | Age: 62
End: 2022-11-21
Payer: COMMERCIAL

## 2022-11-21 DIAGNOSIS — M54.16 LUMBAR RADICULOPATHY: Primary | ICD-10-CM

## 2022-11-21 PROCEDURE — 97110 THERAPEUTIC EXERCISES: CPT | Mod: PO,CQ

## 2022-11-21 NOTE — PROGRESS NOTES
"[facility]   Physical Therapy Treatment Note     Name: Ranjana Kang [facility]  Clinic Number: [id number]    Therapy Diagnosis:   Encounter Diagnosis   Name Primary?    Lumbar radiculopathy Yes       Physician: Tahira Mcmullen,*    Visit Date: 11/21/2022    Physician Orders: PT Eval and Treat   Medical Diagnosis from Referral: M54.16 (ICD-10-CM) - Lumbar radiculopathy   Evaluation Date: 10/25/2022  Authorization Period Expiration: 3/25/2023  Plan of Care Expiration: 1/25/2023  Progress Note Due: 11/25/2022  Visit # / Visits authorized: 3/20   FOTO: 1/3     Precautions: Standard      PTA Visit #: 1/5      Time In: 3:45 pm  Time Out: 4:25 pm  Total Billable Time: 40 minutes     SUBJECTIVE      Pt reports: she was feeling a little sore   She was not provided with home exercise program at initial evaluation.  Response to previous treatment: mild soreness  Functional change: ongoing     Pain: 2/10  Location: bilateral low back and lower extremities       OBJECTIVE      Objective Measures updated at progress report unless specified.      Treatment      Ranjana received the treatments listed below:       therapeutic exercises to develop strength, endurance, ROM, flexibility, and core stabilization for 40 minutes including:  Recumbent bike 6'  PPT 20x5"  Supine active hamstring stretch 3x30"  Piriformis stretch 3x30"  Bridges with RTB around knees 2x10  LTR 10x5"  Braced marching x20  Side lying clamshells with YTB 20x2"  Paloff Press 2x5x3" RTB    Not performed   Supine Anti-Rotation Isometric 15x3"  TB Rows/Pull Downs with TrA 2x10 RTB                 Patient Education and Home Exercises      Home Exercises Provided and Patient Education Provided      Education provided:   - HEP  - Core activation with transitional movements     Written Home Exercises Provided: yes. Exercises were reviewed and Ranjana was able to demonstrate them prior to the end of the session.  Ranjana " demonstrated good  understanding of the education provided. See EMR under Patient Instructions for exercises provided during therapy sessions     ASSESSMENT      Ranjana presents to treatment with continued mild discomfort in buttocks/lower extremities. She reports increased soreness following last PT session so some exercises were held today and others modified. Will continue to monitor symptoms and progress patient as tolerated.      Ranjana Is progressing well towards her goals.   Pt prognosis is Good.      Pt will continue to benefit from skilled outpatient physical therapy to address the deficits listed in the problem list box on initial evaluation, provide pt/family education and to maximize pt's level of independence in the home and community environment.      Pt's spiritual, cultural and educational needs considered and pt agreeable to plan of care and goals.     Anticipated Barriers for therapy: pain limiting      Goals:  Short Term Goals: 4 weeks   Patient will show 25% improvement in lumbar range of motion   2. Patient will show 1/2 grade MMT improvement in hip strength  3. Patient will show increased step and stride length bilaterally throughout gait cycle  4. Patient will comply with home exercise program at all times     Long Term Goals: 8 weeks   Patient will be able to climb and descend 2 flights of stairs without pain increasing.   2. Patent will be able to stand and walk for > 60 minutes without pain increasing  3. Patient will be able to return to 60 minutes of Progressive strengthening exercise without pain increasing  4. 50% FOTO score improvement        PLAN     Continue to progress per patient tolerance     Devora Murphy PTA

## 2022-11-23 ENCOUNTER — CLINICAL SUPPORT (OUTPATIENT)
Dept: REHABILITATION | Facility: HOSPITAL | Age: 62
End: 2022-11-23
Attending: INTERNAL MEDICINE
Payer: COMMERCIAL

## 2022-11-23 DIAGNOSIS — M54.16 LUMBAR RADICULOPATHY: Primary | ICD-10-CM

## 2022-11-23 PROCEDURE — 97110 THERAPEUTIC EXERCISES: CPT | Mod: PO,CQ

## 2022-11-23 NOTE — PROGRESS NOTES
"[facility]   Physical Therapy Treatment Note     Name: Ranjana Kang [facility]  Clinic Number: [id number]    Therapy Diagnosis:   Encounter Diagnosis   Name Primary?    Lumbar radiculopathy Yes         Physician: Tahira Mcmullen,*    Visit Date: 11/23/2022    Physician Orders: PT Eval and Treat   Medical Diagnosis from Referral: M54.16 (ICD-10-CM) - Lumbar radiculopathy   Evaluation Date: 10/25/2022  Authorization Period Expiration: 3/25/2023  Plan of Care Expiration: 1/25/2023  Progress Note Due: 11/25/2022  Visit # / Visits authorized: 4/20   FOTO: 1/3     Precautions: Standard      PTA Visit #: 2/5      Time In: 8:00 am  Time Out: 8:40 am  Total Billable Time: 40 minutes     SUBJECTIVE      Pt reports: she felt good after last visit with no soreness  She was not provided with home exercise program at initial evaluation.  Response to previous treatment: mild soreness  Functional change: ongoing     Pain: 2/10  Location: bilateral low back and lower extremities       OBJECTIVE      Objective Measures updated at progress report unless specified.      Treatment      Ranjana received the treatments listed below:       therapeutic exercises to develop strength, endurance, ROM, flexibility, and core stabilization for 40 minutes including:  Recumbent bike 6'  PPT 20x5"  Supine active hamstring stretch 3x30"  Piriformis stretch 3x30"  Bridges with RTB around knees 2x10  LTR 10x5"  Braced marching x20  Side lying clamshells with YTB 20x2"  Paloff Press with RTB 10x3"   Sit to stand with hip hinging    Not performed   Supine Anti-Rotation Isometric 15x3"  TB Rows/Pull Downs with TrA 2x10 RTB                 Patient Education and Home Exercises      Home Exercises Provided and Patient Education Provided      Education provided:   - HEP  - Core activation with transitional movements     Written Home Exercises Provided: yes. Exercises were reviewed and Ranjana was able to demonstrate " them prior to the end of the session.  Ranjana demonstrated good  understanding of the education provided. See EMR under Patient Instructions for exercises provided during therapy sessions     ASSESSMENT      Ranjana presents to treatment with reports of improving symptoms and only mild discomfort in buttocks/lower extremities. She reports no soreness following last treatment session. Progression of sit to stand with hip hinging was added today with good tolerance. Continue to progress as tolerated.      Ranjana Is progressing well towards her goals.   Pt prognosis is Good.      Pt will continue to benefit from skilled outpatient physical therapy to address the deficits listed in the problem list box on initial evaluation, provide pt/family education and to maximize pt's level of independence in the home and community environment.      Pt's spiritual, cultural and educational needs considered and pt agreeable to plan of care and goals.     Anticipated Barriers for therapy: pain limiting      Goals:  Short Term Goals: 4 weeks   Patient will show 25% improvement in lumbar range of motion   2. Patient will show 1/2 grade MMT improvement in hip strength  3. Patient will show increased step and stride length bilaterally throughout gait cycle  4. Patient will comply with home exercise program at all times     Long Term Goals: 8 weeks   Patient will be able to climb and descend 2 flights of stairs without pain increasing.   2. Patent will be able to stand and walk for > 60 minutes without pain increasing  3. Patient will be able to return to 60 minutes of Progressive strengthening exercise without pain increasing  4. 50% FOTO score improvement        PLAN     Continue to progress per patient tolerance     Devora Murphy PTA

## 2022-11-28 ENCOUNTER — CLINICAL SUPPORT (OUTPATIENT)
Dept: REHABILITATION | Facility: HOSPITAL | Age: 62
End: 2022-11-28
Payer: COMMERCIAL

## 2022-11-28 DIAGNOSIS — M54.16 LUMBAR RADICULOPATHY: Primary | ICD-10-CM

## 2022-11-28 PROCEDURE — 97110 THERAPEUTIC EXERCISES: CPT | Mod: PO,CQ

## 2022-11-29 ENCOUNTER — TELEPHONE (OUTPATIENT)
Dept: INTERNAL MEDICINE | Facility: CLINIC | Age: 62
End: 2022-11-29
Payer: COMMERCIAL

## 2022-11-29 DIAGNOSIS — Z12.31 VISIT FOR SCREENING MAMMOGRAM: Primary | ICD-10-CM

## 2022-11-29 NOTE — TELEPHONE ENCOUNTER
----- Message from Whitney Hernandez sent at 11/29/2022 11:32 AM CST -----  Regarding: Orders  Contact: [phone]  Pt calling to get orders for her mammo. Please call and adv @ [phone].

## 2022-11-30 ENCOUNTER — CLINICAL SUPPORT (OUTPATIENT)
Dept: REHABILITATION | Facility: HOSPITAL | Age: 62
End: 2022-11-30
Attending: INTERNAL MEDICINE
Payer: COMMERCIAL

## 2022-11-30 DIAGNOSIS — M54.16 LUMBAR RADICULOPATHY: Primary | ICD-10-CM

## 2022-11-30 PROCEDURE — 97110 THERAPEUTIC EXERCISES: CPT | Mod: PO,CQ

## 2022-11-30 NOTE — PROGRESS NOTES
"[facility]   Physical Therapy Treatment Note     Name: Ranjana Kang [facility]  Clinic Number: [id number]    Therapy Diagnosis:   Encounter Diagnosis   Name Primary?    Lumbar radiculopathy Yes             Physician: Tahira Mcmullen,*    Visit Date: 11/30/2022    Physician Orders: PT Eval and Treat   Medical Diagnosis from Referral: M54.16 (ICD-10-CM) - Lumbar radiculopathy   Evaluation Date: 10/25/2022  Authorization Period Expiration: 3/25/2023  Plan of Care Expiration: 1/25/2023  Progress Note Due: 11/25/2022  Visit # / Visits authorized: 6/20   FOTO: 1/3     Precautions: Standard      PTA Visit #: 4/5      Time In: 2:00 pm  Time Out: 2:40 pm  Total Billable Time: 40 minutes     SUBJECTIVE      Pt reports: she had a little bit of soreness after last session, but no increase in pain. She took naproxen when helped manage the soreness but did not have to take the gabapentin   She was not provided with home exercise program at initial evaluation.  Response to previous treatment: mild soreness  Functional change: ongoing     Pain: 0/10  Location: bilateral low back and lower extremities       OBJECTIVE      Objective Measures updated at progress report unless specified.      Treatment      Ranjana received the treatments listed below:       therapeutic exercises to develop strength, endurance, ROM, flexibility, and core stabilization for 40 minutes including:  Recumbent bike 6'  PPT 20x5"  Supine pulldowns with blue thera-tubing with PPT 20x5"  Bridges with RTB around knees 2x10  LTR 10x5"  Table top position with tap downs 2x10  Side lying clamshells with RTB 20x2"  Paloff Press with RTB 10x3"   Sit to stand with hip hinging 2x10  Shuttle double leg press 50# 3x10  Lateral walking with YTB 2x20' (with SBA for safety due to pt reporting frequent fall unrelated to current condition)    Not performed   Supine active hamstring stretch 3x30"  Piriformis stretch 3x30"  Supine " "Anti-Rotation Isometric 15x3"  TB Rows/Pull Downs with TrA 2x10 RTB                 Patient Education and Home Exercises      Home Exercises Provided and Patient Education Provided      Education provided:   - HEP  - Core activation with transitional movements     Written Home Exercises Provided: yes. Exercises were reviewed and Ranjana was able to demonstrate them prior to the end of the session.  Ranjana demonstrated good  understanding of the education provided. See EMR under Patient Instructions for exercises provided during therapy sessions     ASSESSMENT      Ranjana presents to treatment with no pain. She reports mild soreness after last session that was muscular from exercise progressions. She was able [place] some walking [place] the mall with no increase in pain, but has not attempted to walk for exercise again. Good tolerance to progressions made today with no increase in pain reported today. Continue to progress as tolerated.     Ranjana Is progressing well towards her goals.   Pt prognosis is Good.      Pt will continue to benefit from skilled outpatient physical therapy to address the deficits listed in the problem list box on initial evaluation, provide pt/family education and to maximize pt's level of independence in the home and community environment.      Pt's spiritual, cultural and educational needs considered and pt agreeable to plan of care and goals.     Anticipated Barriers for therapy: pain limiting      Goals:  Short Term Goals: 4 weeks   Patient will show 25% improvement in lumbar range of motion   2. Patient will show 1/2 grade MMT improvement in hip strength  3. Patient will show increased step and stride length bilaterally throughout gait cycle  4. Patient will comply with home exercise program at all times     Long Term Goals: 8 weeks   Patient will be able to climb and descend 2 flights of stairs without pain increasing.   2. Patent will be able to stand and walk for > 60 minutes without " pain increasing  3. Patient will be able to return to 60 minutes of Progressive strengthening exercise without pain increasing  4. 50% FOTO score improvement        PLAN     Continue to progress per patient tolerance     Devora Murphy PTA

## 2022-12-01 ENCOUNTER — LAB VISIT (OUTPATIENT)
Dept: LAB | Facility: HOSPITAL | Age: 62
End: 2022-12-01
Payer: COMMERCIAL

## 2022-12-01 ENCOUNTER — OFFICE VISIT (OUTPATIENT)
Dept: NEUROLOGY | Facility: CLINIC | Age: 62
End: 2022-12-01
Payer: COMMERCIAL

## 2022-12-01 VITALS
DIASTOLIC BLOOD PRESSURE: 70 MMHG | BODY MASS INDEX: 19.84 KG/M2 | HEART RATE: 73 BPM | HEIGHT: 63 IN | WEIGHT: 112 LBS | SYSTOLIC BLOOD PRESSURE: 114 MMHG

## 2022-12-01 DIAGNOSIS — R25.1 TREMOR: ICD-10-CM

## 2022-12-01 DIAGNOSIS — R25.1 TREMOR: Primary | ICD-10-CM

## 2022-12-01 DIAGNOSIS — R26.89 UNABLE TO BALANCE: ICD-10-CM

## 2022-12-01 DIAGNOSIS — R41.89 COGNITIVE CHANGE: ICD-10-CM

## 2022-12-01 LAB
25(OH)D3+25(OH)D2 SERPL-MCNC: 53 NG/ML (ref 30–96)
TSH SERPL DL<=0.005 MIU/L-ACNC: 2.42 UIU/ML (ref 0.4–4)

## 2022-12-01 PROCEDURE — 84443 ASSAY THYROID STIM HORMONE: CPT | Performed by: PHYSICIAN ASSISTANT

## 2022-12-01 PROCEDURE — 3078F PR MOST RECENT DIASTOLIC BLOOD PRESSURE < 80 MM HG: ICD-10-PCS | Mod: CPTII,S$GLB,, | Performed by: PHYSICIAN ASSISTANT

## 2022-12-01 PROCEDURE — 99204 PR OFFICE/OUTPT VISIT, NEW, LEVL IV, 45-59 MIN: ICD-10-PCS | Mod: S$GLB,,, | Performed by: PHYSICIAN ASSISTANT

## 2022-12-01 PROCEDURE — 1159F MED LIST DOCD IN RCRD: CPT | Mod: CPTII,S$GLB,, | Performed by: PHYSICIAN ASSISTANT

## 2022-12-01 PROCEDURE — 99204 OFFICE O/P NEW MOD 45 MIN: CPT | Mod: S$GLB,,, | Performed by: PHYSICIAN ASSISTANT

## 2022-12-01 PROCEDURE — 3078F DIAST BP <80 MM HG: CPT | Mod: CPTII,S$GLB,, | Performed by: PHYSICIAN ASSISTANT

## 2022-12-01 PROCEDURE — 84630 ASSAY OF ZINC: CPT | Performed by: PHYSICIAN ASSISTANT

## 2022-12-01 PROCEDURE — 1160F PR REVIEW ALL MEDS BY PRESCRIBER/CLIN PHARMACIST DOCUMENTED: ICD-10-PCS | Mod: CPTII,S$GLB,, | Performed by: PHYSICIAN ASSISTANT

## 2022-12-01 PROCEDURE — 3074F PR MOST RECENT SYSTOLIC BLOOD PRESSURE < 130 MM HG: ICD-10-PCS | Mod: CPTII,S$GLB,, | Performed by: PHYSICIAN ASSISTANT

## 2022-12-01 PROCEDURE — 82306 VITAMIN D 25 HYDROXY: CPT | Performed by: PHYSICIAN ASSISTANT

## 2022-12-01 PROCEDURE — 1160F RVW MEDS BY RX/DR IN RCRD: CPT | Mod: CPTII,S$GLB,, | Performed by: PHYSICIAN ASSISTANT

## 2022-12-01 PROCEDURE — 1159F PR MEDICATION LIST DOCUMENTED IN MEDICAL RECORD: ICD-10-PCS | Mod: CPTII,S$GLB,, | Performed by: PHYSICIAN ASSISTANT

## 2022-12-01 PROCEDURE — 3008F PR BODY MASS INDEX (BMI) DOCUMENTED: ICD-10-PCS | Mod: CPTII,S$GLB,, | Performed by: PHYSICIAN ASSISTANT

## 2022-12-01 PROCEDURE — 82607 VITAMIN B-12: CPT | Performed by: PHYSICIAN ASSISTANT

## 2022-12-01 PROCEDURE — 3008F BODY MASS INDEX DOCD: CPT | Mod: CPTII,S$GLB,, | Performed by: PHYSICIAN ASSISTANT

## 2022-12-01 PROCEDURE — 84207 ASSAY OF VITAMIN B-6: CPT | Performed by: PHYSICIAN ASSISTANT

## 2022-12-01 PROCEDURE — 36415 COLL VENOUS BLD VENIPUNCTURE: CPT | Performed by: PHYSICIAN ASSISTANT

## 2022-12-01 PROCEDURE — 99999 PR PBB SHADOW E&M-EST. PATIENT-LVL IV: ICD-10-PCS | Mod: PBBFAC,,, | Performed by: PHYSICIAN ASSISTANT

## 2022-12-01 PROCEDURE — 84425 ASSAY OF VITAMIN B-1: CPT | Performed by: PHYSICIAN ASSISTANT

## 2022-12-01 PROCEDURE — 99999 PR PBB SHADOW E&M-EST. PATIENT-LVL IV: CPT | Mod: PBBFAC,,, | Performed by: PHYSICIAN ASSISTANT

## 2022-12-01 PROCEDURE — 82525 ASSAY OF COPPER: CPT | Performed by: PHYSICIAN ASSISTANT

## 2022-12-01 PROCEDURE — 3074F SYST BP LT 130 MM HG: CPT | Mod: CPTII,S$GLB,, | Performed by: PHYSICIAN ASSISTANT

## 2022-12-01 NOTE — PROGRESS NOTES
"Name: Ranjana Dominguez  MRN: [id number]   CSN: [id number]      Date: 12/1/2022    Referring physician:  Cece Self  No address on file    Chief Complaint: tremors and imbalance     History of Present Illness (HPI): Ranjana Dominguez is a L handed 62 y.o. female with a medical issues significant for lumbar radiculopathy, anxiety, HLD, acquired hypothyroidism, history of bariatric surgery who presents for tremors and imbalance. Tremors started around 2018, no significant events or new medications added at that time. it would only affect her left hand at rest but more commonly when holding things. Sporadic and intermittent. Not sure if alcohol improved the tremor. Throughout the years it has become more consistent and now sometimes the right hand is also affected but mildly. At this time the tremor is only limited to both hands L > R. Regarding the gait imbalance, she stated it also started around the same time and she describes it as "walking after drinking beer", if the floor is not stable, she feels like she needs to hold onto something otherwise she would fall, no locking or difficulty taking the next step. Has not noticed any triggers for this imbalance sensation. She denies vertigo or dizziness. Refers some orthosthasis around a year ago but was fixed with avoiding dehydration. The bariatric surgery was December on 2016, states that she takes a bariatric multivitamin with iron, vitamin D and B12.   Feels she cant walk in a straight line.     History of hypothyroidism, on synthroid. She drinks one coffee a day.     Family History: grandmother had a stroke, great grandmother had dementia (prob alzheimers)    Neuroleptic Exposure: No    Nonmotor/Premotor ROS:  Anosmia: no  Dysarthria/Hypophonia: no  Dysphagia/Sialorrhea: no  Depression: not new   Cognitive slowing: yes, recent memory. No other cognitive problems   Hallucinations: no  Impulsivity: no  Obsessions/Compulsions: " "no  Urinary changes: urgency   Constipation: no  Orthostasis: not anymore after avoiding dehydration   Dyskinesia: at night she would "jump and jerk" before falling asleep  Falls: 3-4 in the past 6 months.   Freezing: no  Micrographia: no  Sleep issues:  -KACY: no  -RBD: 1 bad nightmare that wakes her up and she screams, every 5 months    Vision Changes:   no    Review of Systems:   Review of Systems   Constitutional:  Negative for chills, fever and malaise/fatigue.   HENT:  Negative for hearing loss.    Eyes:  Negative for blurred vision and double vision.   Respiratory:  Negative for cough, shortness of breath and stridor.    Cardiovascular:  Negative for chest pain and leg swelling.   Gastrointestinal:  Negative for constipation, diarrhea and nausea.   Genitourinary:  Negative for frequency and urgency.   Musculoskeletal:  Negative for falls.   Skin:  Negative for itching and rash.   Neurological:  Positive for tremors. Negative for dizziness, loss of consciousness and weakness.   Psychiatric/Behavioral:  Negative for hallucinations and memory loss.          Past Medical History: The patient  has a past medical history of Allergy, Anxiety, Colon polyp, hyperplastic: see colonoscopy 8/18 (8/14/2018), Depression, Family history of colon cancer: father in his 60s (5/13/2016), Glucose intolerance (impaired glucose tolerance) (7/9/2012), Headache(784.0), History of bariatric surgery (7/2/2018), Hypothyroidism, Lumbar radiculopathy (10/25/2022), Memory loss, Menopause syndrome, Mitral valve disorders(424.0) (5/30/2012), Obesity, Other and unspecified hyperlipidemia, Sleep apnea: per sleep study June 2015; CPAP at 11 cm recommended (6/29/2015), Special screening for malignant neoplasms, colon (6/18/2015), and Thyroid disease.    Social History: The patient  reports that she quit smoking about 40 years ago. Her smoking use included cigarettes. She has a 3.50 pack-year smoking history. She has never used smokeless tobacco. " She reports current alcohol use. She reports that she does not use drugs.    Family History: Their family history includes Cancer in her father, maternal grandmother, and mother; Colon cancer in her father; Depression in her father and sister; Heart disease in her paternal grandmother; No Known Problems in her brother and daughter; Sleep apnea in her father and mother.    Allergies: Decongestant tablet and Hydrocodone     Meds:   Current Outpatient Medications on File Prior to Visit   Medication Sig Dispense Refill    cholecalciferol, vitamin D3, (VITAMIN D3) 25 mcg (1,000 unit) capsule Take 2 capsules (2,000 Units total) by mouth once daily. 60 capsule 12    clonazePAM (KLONOPIN) 0.5 MG tablet Take 0.5 mg by mouth once daily.      cyanocobalamin (VITAMIN B-12) 1000 MCG tablet Take 1 tablet (1,000 mcg total) by mouth once daily. 30 tablet 12    fluticasone (FLONASE) 50 mcg/actuation nasal spray SHAKE LIQUID AND USE 2 SPRAYS(100 MCG) IN EACH NOSTRIL EVERY DAY 48 mL 0    gabapentin (NEURONTIN) 100 MG capsule TAKE 1 CAPSULE(100 MG) BY MOUTH THREE TIMES DAILY 90 capsule 0    levothyroxine (SYNTHROID) 88 MCG tablet Take 1 tablet (88 mcg total) by mouth before breakfast. 90 tablet 3    lorazepam (ATIVAN) 0.5 MG tablet   2    naproxen (NAPROSYN) 500 MG tablet Take 500 mg by mouth 2 (two) times daily.      sars-cov-2, covid-19, (MODERNA COVID-19) 50 mcg/0.25 ml injection (BOOSTER) Inject into the muscle. 0.25 mL 0    venlafaxine (EFFEXOR) 50 MG Tab TK 1 T PO  TID  2     Current Facility-Administered Medications on File Prior to Visit   Medication Dose Route Frequency Provider Last Rate Last Admin    dextrose 5 % and 0.45 % NaCl infusion   Intravenous Continuous Hansel Shell MD   New Bag at 08/13/18 0843    dextrose 5 % and 0.45 % NaCl infusion   Intravenous Continuous Hansel Shell MD 20 mL/hr at 08/13/18 0755 New Bag at 08/13/18 0755    sodium chloride 0.9% flush 3 mL  3 mL Intravenous PRN Hansel Shell MD   "         Exam:  /70   Pulse 73   Ht 5' 3" (1.6 m)   Wt 50.8 kg (112 lb) Comment: reported  LMP 07/09/2004   BMI 19.84 kg/m²     Constitutional  Well-developed, well-nourished, appears stated age   Ophthalmoscopic  No papilledema with no hemorrhages or exudates bilaterally   Cardiovascular  Radial pulses 2+ and symmetric, no LE edema bilaterally   Neurological    * Mental status  MOCA =      - Orientation  Oriented to person, place, time, and situation     - Memory   Intact recent and remote     - Attention/concentration  Attentive, vigilant during exam     - Language  Naming & repetition intact, +2-step commands     - Fund of knowledge  Aware of current events     - Executive  Well-organized thoughts     - Other     * Cranial nerves       - CN II  PERRL, visual fields full to confrontation     - CN III, IV, VI  Extraocular movements full, normal pursuits and saccades     - CN V  Sensation V1 - V3 intact     - CN VII  Face strong and symmetric bilaterally     - CN VIII  Hearing intact bilaterally     - CN IX, X  Palate raises midline and symmetric     - CN XI  SCM and trapezius 5/5 bilaterally     - CN XII  Tongue midline   * Motor  Muscle bulk normal, strength 5/5 throughout   * Sensory   Mildly decreased vibratory sense to bilateral feet    Temperature infact    Joint position intact    * Coordination  No dysmetria with finger-to-nose or heel-to-shin   * Gait  See below.   * Deep tendon reflexes  2+ and symmetric throughout   Babinski downgoing bilaterally   * Specialized movement exam  No hypophonic speech.    No facial masking, appropriately animated and very pleasant    No cogwheel rigidity.     No bradykinesia.   Mild tremor with posture and while finger to nose   High frequency resting tremor bilaterally, mostly noted in R thumb    No other dystonia, chorea, athetosis, myoclonus, or tics.   No motor impersistence.   Normal-based gait.   No shortened stride length.   No abnormal arm swing.     No " postural instability.    Romberg absent    Able to tandem with some difficulty      Laboratory/Radiological:  - Results:  Lab Visit on 12/01/2022   Component Date Value Ref Range Status    Vitamin B-12 12/01/2022 1223 (H)  180 - 914 ng/L Final    Vit D, 25-Hydroxy 12/01/2022 53  30 - 96 ng/mL Final    TSH 12/01/2022 2.425  0.400 - 4.000 uIU/mL Final       - Independent review of images:      - Independent review of consultant's notes:     ASSESSMENT/PLAN:  Tremor   - favoring ET vs enhanced physiologic tremor   - no meds at this time       2. Imbalance   - history of bariatric surgery   - checking labs as below   - several falls in the past 6 months   - continue PT       3. Cognitive change   - labs as below   - neuropsych testing     Orders Placed This Encounter    Vitamin B1    Vitamin B12 Deficiency Panel    COPPER, SERUM    ZINC    Vitamin D    VITAMIN B6    TSH    Ambulatory referral/consult to Adult Neuropsychology           Follow up: in 3 months with [facility]     Collaborating Physician, Dr. Barnard, was available during today's encounter. Any change to plan along with cosign to appear in the EMR.       Total time spent with the patient: 48 minutes.  35 minutes of face-to-face consultation and 13 minutes of chart review and coordination of care, on the day of the visit. This includes face to face time and non-face to face time preparing to see the patient (eg, review of tests), obtaining and/or reviewing separately obtained history, documenting clinical information in the electronic or other health record, independently interpreting resultsand communicating results to the patient/family/caregiver, or care coordination.         Rosana Dexter PA-C   [facility]  Department of Neurology  Movement Disorders

## 2022-12-03 LAB — VIT B12 SERPL-MCNC: 1223 NG/L (ref 180–914)

## 2022-12-05 ENCOUNTER — CLINICAL SUPPORT (OUTPATIENT)
Dept: REHABILITATION | Facility: HOSPITAL | Age: 62
End: 2022-12-05
Payer: COMMERCIAL

## 2022-12-05 DIAGNOSIS — R53.1 WEAKNESS: ICD-10-CM

## 2022-12-05 DIAGNOSIS — Z74.09 DECREASED MOBILITY AND ENDURANCE: Primary | ICD-10-CM

## 2022-12-05 LAB
COPPER SERPL-MCNC: 805 UG/L (ref 810–1990)
ZINC SERPL-MCNC: 83 UG/DL (ref 60–130)

## 2022-12-05 PROCEDURE — 97110 THERAPEUTIC EXERCISES: CPT | Mod: PO

## 2022-12-05 PROCEDURE — 97140 MANUAL THERAPY 1/> REGIONS: CPT | Mod: PO

## 2022-12-05 NOTE — PROGRESS NOTES
[facility]   Physical Therapy Treatment Note     Name: Ranjana Duquebert  Clinic Number: [id number]    Therapy Diagnosis:   Encounter Diagnoses   Name Primary?    Decreased mobility and endurance Yes    Weakness          Physician: Tahira Mcmullen,*    Visit Date: 12/5/2022    Physician Orders: PT Eval and Treat   Medical Diagnosis from Referral: M54.16 (ICD-10-CM) - Lumbar radiculopathy   Evaluation Date: 10/25/2022  Authorization Period Expiration: 3/25/2023  Plan of Care Expiration: 1/25/2023  Progress Note Due: 1/5//2022  Visit # / Visits authorized: 7/20   FOTO: 1/3     Precautions: Standard      PTA Visit #: 4/5      Time In: 917 am  Time Out: 1000 am  Total Billable Time: 43 minutes TE 2, MT 1     SUBJECTIVE      Pt reports: no major changes but pain after session and around 25 min of walking.     She was not provided with home exercise program at initial evaluation.  Response to previous treatment: mild soreness  Functional change: walking tolerance 25 min     Pain: 0/10  Location: bilateral low back and lower extremities       OBJECTIVE      Posture Alignment: flattening of the lumbar spine to prevent lumbar extension in standing and sitting     LUMBAR SPINE AROM:   Flexion: WFL   Extension: 50% with pain   Left Sidebend: 50%   Right Sidebend: 50% with pain left   Left Rotation: 50%   Right Rotation: 50%      SEGMENTAL MOBILITY: min restriction throughout the lumbar spine in UPA and rotational glides at grade 2-3 T10-L4 except level L1 and L2 with moderate  on B rotation    Palpation: marked guarding along L 1-3 psoas origin and mild guarding lateral border of glut medius along iliac crest.     Gait excessive L4-S1 rotation  versus decreased true hip extension      Treatment      Ranjana received the treatments listed below:       therapeutic exercises to develop strength, endurance, ROM, flexibility, and core stabilization for 30 minutes  "including:  Including reassessment on 12/5/22:    Recumbent bike 6'  PPT 20x5"  Supine pulldowns with blue thera-tubing vs 6# weight isometric overhead with PPT 20x5"  Bridges with RTB around knees 2x10  LTR 10x5" with contralateral upper extremity flexion overhead  Table top position with tap downs 2x10  Side lying clamshells with RTB 20x2"  Supine clams in up bridge position with clams 2 x10 bilateral vs unilateral reciprocal  Paloff Press with RTB 10x3"   Sit to stand with hip hinging 2x10  Shuttle double leg press 50# 3x10  Lateral walking with YTB 2x20' (with SBA for safety due to pt reporting frequent fall unrelated to current condition)    Not performed   Supine active hamstring stretch 3x30"  Piriformis stretch 3x30"  Supine Anti-Rotation Isometric 15x3"  TB Rows/Pull Downs with TrA 2x10 RTB        Manual therapy x 13 min :  STM glut medius, psoas major origin  PA L 2 grade II  UPA T8-L3 grade II       Patient Education and Home Exercises      Home Exercises Provided and Patient Education Provided      Education provided:   - HEP  - Core activation with transitional movements     Written Home Exercises Provided: yes. Exercises were reviewed and Ranjana was able to demonstrate them prior to the end of the session.  Ranjana demonstrated good  understanding of the education provided. See EMR under Patient Instructions for exercises provided during therapy sessions     ASSESSMENT      Ranjana presents to treatment with no pain during session with pain with continued prolonged sitting +45 min or prolonged walking +25 min and continues with pain later in the day on the day of her therapy session. Pt was cued for proper PPT during her lumbar exercises and cues for increased body awareness and tolerance with signs of fatigue.  Pt achieved 1 of 8 set goals and progressing toward 4 of 8.    Ranjana Is progressing well towards her goals.   Pt prognosis is Good.      Pt will continue to benefit from skilled " outpatient physical therapy to address the deficits listed in the problem list box on initial evaluation, provide pt/family education and to maximize pt's level of independence in the home and community environment.      Pt's spiritual, cultural and educational needs considered and pt agreeable to plan of care and goals.     Anticipated Barriers for therapy: pain limiting      Goals:  Short Term Goals: 4 weeks   Patient will show 25% improvement in lumbar range of motion. progressing  2. Patient will show 1/2 grade MMT improvement in hip strength  3. Patient will show increased step and stride length bilaterally throughout gait cycle. Progressing   4. Patient will comply with home exercise program at all times.  MET 12/5/22     Long Term Goals: 8 weeks   Patient will be able to climb and descend 2 flights of stairs without pain increasing.   2. Patent will be able to stand and walk for > 60 minutes without pain increasing. Progressing   3. Patient will be able to return to 60 minutes of Progressive strengthening exercise without pain increasing  4. 50% FOTO score improvement        PLAN     Continue to progress per patient tolerance as per original POC    Chantal Farris, PT

## 2022-12-06 ENCOUNTER — PATIENT MESSAGE (OUTPATIENT)
Dept: NEUROLOGY | Facility: CLINIC | Age: 62
End: 2022-12-06
Payer: COMMERCIAL

## 2022-12-06 LAB
PYRIDOXAL SERPL-MCNC: 11 UG/L (ref 5–50)
VIT B1 BLD-MCNC: 96 UG/L (ref 38–122)

## 2022-12-07 ENCOUNTER — CLINICAL SUPPORT (OUTPATIENT)
Dept: REHABILITATION | Facility: HOSPITAL | Age: 62
End: 2022-12-07
Attending: INTERNAL MEDICINE
Payer: COMMERCIAL

## 2022-12-07 DIAGNOSIS — M54.16 LUMBAR RADICULOPATHY: ICD-10-CM

## 2022-12-07 DIAGNOSIS — Z74.09 DECREASED MOBILITY AND ENDURANCE: Primary | ICD-10-CM

## 2022-12-07 DIAGNOSIS — R53.1 WEAKNESS: ICD-10-CM

## 2022-12-07 PROCEDURE — 97110 THERAPEUTIC EXERCISES: CPT | Mod: PO,CQ

## 2022-12-07 NOTE — PROGRESS NOTES
"[facility]   Physical Therapy Treatment Note     Name: Ranjana Dominguez  Clinic Number: [id number]    Therapy Diagnosis:   Encounter Diagnoses   Name Primary?    Decreased mobility and endurance Yes    Weakness     Lumbar radiculopathy            Physician: Tahira Mcmullen,*    Visit Date: 12/7/2022    Physician Orders: PT Eval and Treat   Medical Diagnosis from Referral: M54.16 (ICD-10-CM) - Lumbar radiculopathy   Evaluation Date: 10/25/2022  Authorization Period Expiration: 3/25/2023  Plan of Care Expiration: 1/25/2023  Progress Note Due: 1/5//2022  Visit # / Visits authorized: 8/20    FOTO: 1/3     Precautions: Standard      PTA Visit #: 1/5      Time In: 2:00 pm  Time Out: 2:53 pm  Total Billable Time: 53 minutes      SUBJECTIVE      Pt reports: she has had good relief since last session.   She was compliant with home exercise program  Response to previous treatment: tolerated well, less pain  Functional change: walking tolerance 25 min     Pain: 0/10  Location: bilateral low back and lower extremities       OBJECTIVE      No objective measures taken this session     Treatment      Ranjana received the treatments listed below:       therapeutic exercises to develop strength, endurance, ROM, flexibility, and core stabilization for 53 minutes including:  Including reassessment on 12/5/22:    Recumbent bike 6'  PPT 20x5"  Supine pulldowns with blue thera-tubing vs 6# weight isometric overhead with PPT 20x5"  Bridges with RTB around knees 2x10  LTR 10x5" with contralateral upper extremity flexion overhead  Table top position with tap downs 2x10  Side lying clamshells in modified side plank with RTB 2x10  Supine clams in up bridge position with clams 2 x10 bilateral vs unilateral reciprocal  Paloff Press with RTB 10x3"   Sit to stand with hip hinging x10 with no weight and x10 with 5# KB  Shuttle double leg press 50# 3x10  Lateral walking with YTB 2x20' (with SBA for " "safety due to pt reporting frequent fall unrelated to current condition)    Not performed   Supine active hamstring stretch 3x30"  Piriformis stretch 3x30"  Supine Anti-Rotation Isometric 15x3"  TB Rows/Pull Downs with TrA 2x10 RTB        Manual therapy x 00 min :  STM glut medius, psoas major origin  PA L 2 grade II  UPA T8-L3 grade II       Patient Education and Home Exercises      Home Exercises Provided and Patient Education Provided      Education provided:   - HEP  - Core activation with transitional movements     Written Home Exercises Provided: yes. Exercises were reviewed and Ranjana was able to demonstrate them prior to the end of the session.  Ranjana demonstrated good  understanding of the education provided. See EMR under Patient Instructions for exercises provided during therapy sessions     ASSESSMENT      Ranjana presents to treatment pain free and reports improving overall pain and tolerance to walking since last session. She was progressed with core and glute exercises today, but continues to require cueing for maintaining core activation during exercises. She was apprehensive with participating in resisted lateral walking today due to history of falls, so it was not performed today. Continue to progress as tolerated.       Ranjana Is progressing well towards her goals.   Pt prognosis is Good.      Pt will continue to benefit from skilled outpatient physical therapy to address the deficits listed in the problem list box on initial evaluation, provide pt/family education and to maximize pt's level of independence in the home and community environment.      Pt's spiritual, cultural and educational needs considered and pt agreeable to plan of care and goals.     Anticipated Barriers for therapy: pain limiting      Goals:  Short Term Goals: 4 weeks   Patient will show 25% improvement in lumbar range of motion. progressing  2. Patient will show 1/2 grade MMT improvement in hip strength  3. Patient " will show increased step and stride length bilaterally throughout gait cycle. Progressing   4. Patient will comply with home exercise program at all times.  MET 12/5/22     Long Term Goals: 8 weeks   Patient will be able to climb and descend 2 flights of stairs without pain increasing.   2. Patent will be able to stand and walk for > 60 minutes without pain increasing. Progressing   3. Patient will be able to return to 60 minutes of Progressive strengthening exercise without pain increasing  4. 50% FOTO score improvement        PLAN     Continue to progress per patient tolerance as per original POC    Devora Murphy, PTA

## 2022-12-12 ENCOUNTER — CLINICAL SUPPORT (OUTPATIENT)
Dept: REHABILITATION | Facility: HOSPITAL | Age: 62
End: 2022-12-12
Payer: COMMERCIAL

## 2022-12-12 DIAGNOSIS — R53.1 WEAKNESS: ICD-10-CM

## 2022-12-12 DIAGNOSIS — M54.16 LUMBAR RADICULOPATHY: ICD-10-CM

## 2022-12-12 DIAGNOSIS — Z74.09 DECREASED MOBILITY AND ENDURANCE: Primary | ICD-10-CM

## 2022-12-12 PROCEDURE — 97110 THERAPEUTIC EXERCISES: CPT | Mod: PO,CQ

## 2022-12-12 NOTE — PROGRESS NOTES
"[facility]   Physical Therapy Treatment Note     Name: Ranjana Duquebert  Clinic Number: [id number]    Therapy Diagnosis:   Encounter Diagnoses   Name Primary?    Decreased mobility and endurance Yes    Weakness     Lumbar radiculopathy              Physician: Tahira Mcmullen,*    Visit Date: 12/12/2022    Physician Orders: PT Eval and Treat   Medical Diagnosis from Referral: M54.16 (ICD-10-CM) - Lumbar radiculopathy   Evaluation Date: 10/25/2022  Authorization Period Expiration: 3/25/2023  Plan of Care Expiration: 1/25/2023  Progress Note Due: 1/5//2022  Visit # / Visits authorized: 9/20    FOTO: 1/3     Precautions: Standard      PTA Visit #: 2/5      Time In: 1:33 pm  Time Out: 2:12 pm  Total Billable Time: 39 minutes      SUBJECTIVE      Pt reports: she has felt good since last session, reports taking naproxen on two different days due to soreness in upper back, but she has not had pain in her buttocks or lower extremities   She was compliant with home exercise program  Response to previous treatment: tolerated well, less pain  Functional change: walking tolerance 25 min     Pain: 0/10  Location: bilateral low back and lower extremities       OBJECTIVE      No objective measures taken this session     Treatment      Ranjana received the treatments listed below:       therapeutic exercises to develop strength, endurance, ROM, flexibility, and core stabilization for 39 minutes including:  Including reassessment on 12/5/22:    Recumbent bike 6'  PPT 20x5"  Supine pulldowns with blue thera-tubing vs 6# weight isometric overhead with PPT 20x5"  Bridges with RTB around knees 2x10  LTR 10x5" with contralateral upper extremity flexion overhead  Table top position with tap downs 2x10  Side lying clamshells in modified side plank with RTB 2x10  Supine clams in up bridge position with clams 2 x10 bilateral vs unilateral reciprocal  Paloff Press with RTB 10x3"   Sit to stand " "with hip hinging 2x10 with 5# KB  Shuttle double leg press 2 black bands, 1 red band 3x10  Pushing sled with no weight with long strides for hip extension 1 lap  Lateral walking with YTB 2x20' (with SBA for safety due to pt reporting frequent fall unrelated to current condition)    Not performed   Supine active hamstring stretch 3x30"  Piriformis stretch 3x30"  Supine Anti-Rotation Isometric 15x3"  TB Rows/Pull Downs with TrA 2x10 RTB        Manual therapy x 00 min :  STM glut medius, psoas major origin  PA L 2 grade II  UPA T8-L3 grade II       Patient Education and Home Exercises      Home Exercises Provided and Patient Education Provided      Education provided:   - HEP  - Core activation with transitional movements     Written Home Exercises Provided: yes. Exercises were reviewed and Ranjana was able to demonstrate them prior to the end of the session.  Ranjana demonstrated good  understanding of the education provided. See EMR under Patient Instructions for exercises provided during therapy sessions     ASSESSMENT      Ranjana presents to treatment with no pain. She reports some soreness that only required naproxen to resolve. Good tolerance to exercises with no increase in pain. She did report muscle soreness following treatment but no pain reported. Continue to progress as tolerated.       Ranjana Is progressing well towards her goals.   Pt prognosis is Good.      Pt will continue to benefit from skilled outpatient physical therapy to address the deficits listed in the problem list box on initial evaluation, provide pt/family education and to maximize pt's level of independence in the home and community environment.      Pt's spiritual, cultural and educational needs considered and pt agreeable to plan of care and goals.     Anticipated Barriers for therapy: pain limiting      Goals:  Short Term Goals: 4 weeks   Patient will show 25% improvement in lumbar range of motion. progressing  2. Patient will show " 1/2 grade MMT improvement in hip strength  3. Patient will show increased step and stride length bilaterally throughout gait cycle. Progressing   4. Patient will comply with home exercise program at all times.  MET 12/5/22     Long Term Goals: 8 weeks   Patient will be able to climb and descend 2 flights of stairs without pain increasing.   2. Patent will be able to stand and walk for > 60 minutes without pain increasing. Progressing   3. Patient will be able to return to 60 minutes of Progressive strengthening exercise without pain increasing  4. 50% FOTO score improvement        PLAN     Continue to progress per patient tolerance as per original POC    Devora Murphy, PTA

## 2022-12-14 ENCOUNTER — CLINICAL SUPPORT (OUTPATIENT)
Dept: REHABILITATION | Facility: HOSPITAL | Age: 62
End: 2022-12-14
Payer: COMMERCIAL

## 2022-12-14 DIAGNOSIS — M54.16 LUMBAR RADICULOPATHY: Primary | ICD-10-CM

## 2022-12-14 PROCEDURE — 97110 THERAPEUTIC EXERCISES: CPT | Mod: PO

## 2022-12-14 NOTE — PROGRESS NOTES
"[facility]   Physical Therapy Treatment Note     Name: Ranjana Duquebert  Clinic Number: [id number]    Therapy Diagnosis:   Encounter Diagnosis   Name Primary?    Lumbar radiculopathy Yes             Physician: Tahira Mcmullen,*    Visit Date: 12/14/2022    Physician Orders: PT Eval and Treat   Medical Diagnosis from Referral: M54.16 (ICD-10-CM) - Lumbar radiculopathy   Evaluation Date: 10/25/2022  Authorization Period Expiration: 3/25/2023  Plan of Care Expiration: 1/25/2023  Progress Note Due: 1/5//2022  Visit # / Visits authorized: 9/20    FOTO: 1/3     Precautions: Standard      PTA Visit #: 2/5      Time In: 1:17 pm  Time Out: 2:12 pm  Total Billable Time: 39 minutes      SUBJECTIVE      Pt reports: that she is feeling better overall and is not having any pain at this time.     She was compliant with home exercise program  Response to previous treatment: tolerated well, less pain  Functional change: walking tolerance 25 min     Pain: 0/10  Location: bilateral low back and lower extremities       OBJECTIVE      No objective measures taken this session     Treatment   Bolded activities performed today.    Ranjana received the treatments listed below:       therapeutic exercises to develop strength, endurance, ROM, flexibility, and core stabilization for 43 minutes including:  Including reassessment on 12/5/22:    Recumbent bike 6'  Gluteal sets with hips extended in supine x 10 with 5 sec hold  Shuttle standing hip extension on R & L - not as effective  Prone hip extension with knee flexed 10 x 2 on R & L  Prone hip extension with knee extended x 10 on R & L  Supine hip thrusters with bolster under thighs 10 x 2  Clams with red TB 10 x 2 on R & L  Bridges with green TB - (hip abd, gluteal set, lift)    PPT 20x5"  Supine pulldowns with blue thera-tubing vs 6# weight isometric overhead with PPT 20x5"  Bridges with RTB around knees 2x10  LTR 10x5" with contralateral " "upper extremity flexion overhead  Table top position with tap downs 2x10  Side lying clamshells in modified side plank with RTB 2x10  Supine clams in up bridge position with clams 2 x10 bilateral vs unilateral reciprocal  Paloff Press with RTB 10x3"   Sit to stand with hip hinging 2x10 with 5# KB  Shuttle double leg press 2 black bands, 1 red band 3x10  Pushing sled with no weight with long strides for hip extension 1 lap  Lateral walking with YTB 2x20' (with SBA for safety due to pt reporting frequent fall unrelated to current condition)    Not performed   Supine active hamstring stretch 3x30"  Piriformis stretch 3x30"  Supine Anti-Rotation Isometric 15x3"  TB Rows/Pull Downs with TrA 2x10 RTB        Manual therapy x 00 min :  STM glut medius, psoas major origin  PA L 2 grade II  UPA T8-L3 grade II       Patient Education and Home Exercises      Home Exercises Provided and Patient Education Provided      Education provided:   - HEP  - Core activation with transitional movements     Written Home Exercises Provided: yes. Exercises were reviewed and Ranjana was able to demonstrate them prior to the end of the session.  Ranjana demonstrated good  understanding of the education provided. See EMR under Patient Instructions for exercises provided during therapy sessions     ASSESSMENT      Ranjana presents to treatment with no pain. She exhibits difficulty engaging her gluteal muscles with bridges as well as gluteal sets in hook lying.  She was better able to fire her gluteals in supine with hips extended, prone hip extension with and without knee flexion, side lying clams, hip thrusters over a bolster and bridges with a green TB around her knees. She were very specific with her bridges  performing hip abd followed by a gluteal set prior to lifting her hips.  Pt appears to understand several ways to facilitate contraction of her gluteal muscles.  Pt tolerated Tx well today.  Continue to progress a lumbar stabilization " program.      Ranjana Is progressing well towards her goals.   Pt prognosis is Good.      Pt will continue to benefit from skilled outpatient physical therapy to address the deficits listed in the problem list box on initial evaluation, provide pt/family education and to maximize pt's level of independence in the home and community environment.      Pt's spiritual, cultural and educational needs considered and pt agreeable to plan of care and goals.     Anticipated Barriers for therapy: pain limiting      Goals:  Short Term Goals: 4 weeks   Patient will show 25% improvement in lumbar range of motion. progressing  2. Patient will show 1/2 grade MMT improvement in hip strength  3. Patient will show increased step and stride length bilaterally throughout gait cycle. Progressing   4. Patient will comply with home exercise program at all times.  MET 12/5/22     Long Term Goals: 8 weeks   Patient will be able to climb and descend 2 flights of stairs without pain increasing.   2. Patent will be able to stand and walk for > 60 minutes without pain increasing. Progressing   3. Patient will be able to return to 60 minutes of Progressive strengthening exercise without pain increasing  4. 50% FOTO score improvement        PLAN     Continue to progress per patient tolerance as per original POC    Miles Horan, PT

## 2022-12-19 ENCOUNTER — CLINICAL SUPPORT (OUTPATIENT)
Dept: REHABILITATION | Facility: HOSPITAL | Age: 62
End: 2022-12-19
Payer: COMMERCIAL

## 2022-12-19 DIAGNOSIS — R53.1 WEAKNESS: ICD-10-CM

## 2022-12-19 DIAGNOSIS — Z74.09 DECREASED MOBILITY AND ENDURANCE: ICD-10-CM

## 2022-12-19 DIAGNOSIS — M54.16 LUMBAR RADICULOPATHY: Primary | ICD-10-CM

## 2022-12-19 PROCEDURE — 97110 THERAPEUTIC EXERCISES: CPT | Mod: PO

## 2022-12-19 NOTE — PROGRESS NOTES
"[facility]   Physical Therapy Treatment Note     Name: Ranjana Duquebert  Clinic Number: [id number]    Therapy Diagnosis:   Encounter Diagnoses   Name Primary?    Lumbar radiculopathy Yes    Decreased mobility and endurance     Weakness      Physician: Tahira Mcmullen,*    Visit Date: 12/19/2022    Physician Orders: PT Eval and Treat   Medical Diagnosis from Referral: M54.16 (ICD-10-CM) - Lumbar radiculopathy   Evaluation Date: 10/25/2022  Authorization Period Expiration: 3/25/2023  Plan of Care Expiration: 1/25/2023  Progress Note Due: 1/5//2022  Visit # / Visits authorized: 11/20    FOTO: 1/3     Precautions: Standard      PTA Visit #: 0/5      Time In: 1200  Time Out: 1240  Total Billable Time: 40 minutes      SUBJECTIVE      Pt reports: that she is feeling better overall and is not having any pain at this time. Patient is agreeable to today's treatment.      She was compliant with home exercise program  Response to previous treatment: tolerated well, less pain  Functional change: walking tolerance 25 min     Pain: 0/10  Location: bilateral low back and lower extremities       OBJECTIVE      No objective measures taken this session     Treatment   Bolded activities performed today.     Ranjana received the treatments listed below:       therapeutic exercises to develop strength, endurance, ROM, flexibility, and core stabilization for 43 minutes including:  Including reassessment on 12/5/22:     Recumbent bike 6'  Gluteal sets with hips extended in supine x 10 with 5 sec hold  Shuttle standing hip extension on R & L - not as effective  Prone hip extension with knee flexed 10 x 2 on R & L  Prone hip extension with knee extended x 10 on R & L  Supine hip thrusters with bolster under thighs 10 x 2  Clams with red TB 10 x 2 on R & L  Bridges with green TB - (hip abd, gluteal set, lift)  Frog Pump w/ BOSU x12x3"   Banded Pull Through x12 RTB    Hip Matrix   ABD x12 ea " "25#  Ext x10ea 25#        PPT 20x5"  Supine pulldowns with blue thera-tubing vs 6# weight isometric overhead with PPT 20x5"  Bridges with RTB around knees 2x10  LTR 10x5" with contralateral upper extremity flexion overhead  Table top position with tap downs 2x10  Side lying clamshells in modified side plank with RTB 2x10  Supine clams in up bridge position with clams 2 x10 bilateral vs unilateral reciprocal  Paloff Press with RTB 10x3"   Sit to stand with hip hinging 2x10 with 5# KB  Shuttle double leg press 2 black bands, 1 red band 3x10  Pushing sled with no weight with long strides for hip extension 1 lap  Lateral walking with YTB 2x20' (with SBA for safety due to pt reporting frequent fall unrelated to current condition)     Not performed   Supine active hamstring stretch 3x30"  Piriformis stretch 3x30"  Supine Anti-Rotation Isometric 15x3"  TB Rows/Pull Downs with TrA 2x10 RTB        Manual therapy x 00 min :  STM glut medius, psoas major origin  PA L 2 grade II  UPA T8-L3 grade II        Patient Education and Home Exercises      Home Exercises Provided and Patient Education Provided      Education provided:   - HEP  - Core activation with transitional movements     Written Home Exercises Provided: yes. Exercises were reviewed and Ranjana was able to demonstrate them prior to the end of the session.  Ranjana demonstrated good  understanding of the education provided. See EMR under Patient Instructions for exercises provided during therapy sessions     ASSESSMENT      Ranjana presents to treatment with no pain. She continues to benefit from training focused on improving glute activation. Patient tolerated programming well and ws fatigued at en of treatment. Continue to progress a lumbar stabilization program.        Ranjana Is progressing well towards her goals.   Pt prognosis is Good.      Pt will continue to benefit from skilled outpatient physical therapy to address the deficits listed in the problem list " box on initial evaluation, provide pt/family education and to maximize pt's level of independence in the home and community environment.      Pt's spiritual, cultural and educational needs considered and pt agreeable to plan of care and goals.     Anticipated Barriers for therapy: pain limiting      Goals:  Short Term Goals: 4 weeks   Patient will show 25% improvement in lumbar range of motion. progressing  2. Patient will show 1/2 grade MMT improvement in hip strength  3. Patient will show increased step and stride length bilaterally throughout gait cycle. Progressing   4. Patient will comply with home exercise program at all times.  MET 12/5/22     Long Term Goals: 8 weeks   Patient will be able to climb and descend 2 flights of stairs without pain increasing.   2. Patent will be able to stand and walk for > 60 minutes without pain increasing. Progressing   3. Patient will be able to return to 60 minutes of Progressive strengthening exercise without pain increasing  4. 50% FOTO score improvement        PLAN      Continue to progress per patient tolerance as per original POC    Blair Abbasi, PT

## 2022-12-21 ENCOUNTER — CLINICAL SUPPORT (OUTPATIENT)
Dept: REHABILITATION | Facility: HOSPITAL | Age: 62
End: 2022-12-21
Attending: INTERNAL MEDICINE
Payer: COMMERCIAL

## 2022-12-21 DIAGNOSIS — R53.1 WEAKNESS: ICD-10-CM

## 2022-12-21 DIAGNOSIS — Z74.09 DECREASED MOBILITY AND ENDURANCE: ICD-10-CM

## 2022-12-21 DIAGNOSIS — M54.16 LUMBAR RADICULOPATHY: Primary | ICD-10-CM

## 2022-12-21 PROCEDURE — 97110 THERAPEUTIC EXERCISES: CPT | Mod: PO,CQ

## 2022-12-21 NOTE — PROGRESS NOTES
"[facility]   Physical Therapy Treatment Note     Name: Ranjana Kang [facility]  Clinic Number: [id number]    Therapy Diagnosis:   Encounter Diagnoses   Name Primary?    Lumbar radiculopathy Yes    Decreased mobility and endurance     Weakness        Physician: Tahira Mcmullen,*    Visit Date: 12/21/2022    Physician Orders: PT Eval and Treat   Medical Diagnosis from Referral: M54.16 (ICD-10-CM) - Lumbar radiculopathy   Evaluation Date: 10/25/2022  Authorization Period Expiration: 3/25/2023  Plan of Care Expiration: 1/25/2023  Progress Note Due: 1/5//2022  Visit # / Visits authorized: 12/20    FOTO: 1/3     Precautions: Standard      PTA Visit #: 1/5      Time In: 2:00 pm  Time Out: 2:45 pm  Total Billable Time: 45 minutes      SUBJECTIVE      Pt reports: she had some muscle soreness following the last session, but not the pain that goes down her leg     She was compliant with home exercise program  Response to previous treatment: tolerated well, less pain  Functional change: walking tolerance 25 min     Pain: 0/10  Location: bilateral low back and lower extremities       OBJECTIVE      No objective measures taken this session     Treatment   Bolded activities performed today.     Ranjana received the treatments listed below:       therapeutic exercises to develop strength, endurance, ROM, flexibility, and core stabilization for 45 minutes including:  Including reassessment on 12/5/22:     Recumbent bike 6'  Gluteal sets with hips extended in supine x 10 with 5 sec hold  Shuttle standing hip extension on R & L 10 x 2  Prone hip extension with knee flexed 10 x 2 on R & L  Prone hip extension with knee extended x 10 on R & L  Supine hip thrusters with bolster under thighs 10 x 2  Clams with red TB 10 x 2 on R & L  Bridges with green TB - (hip abd, gluteal set, lift)  Frog Pump w/ BOSU x12x3"   Banded Pull Through x12 RTB    Hip Matrix   ABD 2x10 ea 25#  Ext x10ea " "25#        PPT 20x5"  Supine pulldowns with blue thera-tubing vs 6# weight isometric overhead with PPT 20x5"  Bridges with RTB around knees 2x10  LTR 10x5" with contralateral upper extremity flexion overhead  Table top position with tap downs 2x10  Side lying clamshells in modified side plank with RTB 2x10  Supine clams in up bridge position with clams 2 x10 bilateral vs unilateral reciprocal  Paloff Press with RTB 10x3"   Sit to stand with hip hinging 2x10 with 5# KB  Shuttle double leg press 2 black bands, 1 red band 3x10  Pushing sled with no weight with long strides for hip extension 1 lap  Lateral walking with YTB 2x20' (with SBA for safety due to pt reporting frequent fall unrelated to current condition)        Manual therapy x 00 min :  STM glut medius, psoas major origin  PA L 2 grade II  UPA T8-L3 grade II        Patient Education and Home Exercises      Home Exercises Provided and Patient Education Provided      Education provided:   - HEP  - Core activation with transitional movements     Written Home Exercises Provided: yes. Exercises were reviewed and Ranjana was able to demonstrate them prior to the end of the session.  Ranjana demonstrated good  understanding of the education provided. See EMR under Patient Instructions for exercises provided during therapy sessions     ASSESSMENT      Ranjana presents to treatment with no pain. She reports muscle soreness following last session, but no increase in pain. Continued focus on glute activation. She continues to be unable to maintain glute activation during bridges. Continue to progress as tolerated.         Ranjana Is progressing well towards her goals.   Pt prognosis is Good.      Pt will continue to benefit from skilled outpatient physical therapy to address the deficits listed in the problem list box on initial evaluation, provide pt/family education and to maximize pt's level of independence in the home and community environment.      Pt's " spiritual, cultural and educational needs considered and pt agreeable to plan of care and goals.     Anticipated Barriers for therapy: pain limiting      Goals:  Short Term Goals: 4 weeks   Patient will show 25% improvement in lumbar range of motion. progressing  2. Patient will show 1/2 grade MMT improvement in hip strength  3. Patient will show increased step and stride length bilaterally throughout gait cycle. Progressing   4. Patient will comply with home exercise program at all times.  MET 12/5/22     Long Term Goals: 8 weeks   Patient will be able to climb and descend 2 flights of stairs without pain increasing.   2. Patent will be able to stand and walk for > 60 minutes without pain increasing. Progressing   3. Patient will be able to return to 60 minutes of Progressive strengthening exercise without pain increasing  4. 50% FOTO score improvement        PLAN      Continue to progress per patient tolerance as per original POC    Devora Murphy PTA

## 2022-12-28 ENCOUNTER — CLINICAL SUPPORT (OUTPATIENT)
Dept: REHABILITATION | Facility: HOSPITAL | Age: 62
End: 2022-12-28
Payer: COMMERCIAL

## 2022-12-28 DIAGNOSIS — M54.16 LUMBAR RADICULOPATHY: Primary | ICD-10-CM

## 2022-12-28 DIAGNOSIS — R53.1 WEAKNESS: ICD-10-CM

## 2022-12-28 DIAGNOSIS — Z74.09 DECREASED MOBILITY AND ENDURANCE: ICD-10-CM

## 2022-12-28 PROCEDURE — 97110 THERAPEUTIC EXERCISES: CPT | Mod: PO

## 2022-12-28 NOTE — PROGRESS NOTES
[facility]   Physical Therapy Progress Note     Name: Ranjana Dominguez  Clinic Number: [id number]    Therapy Diagnosis:   Encounter Diagnoses   Name Primary?    Lumbar radiculopathy Yes    Weakness     Decreased mobility and endurance      Physician: Tahira Mcmullen,*    Visit Date: 12/28/2022    Physician Orders: PT Eval and Treat   Medical Diagnosis from Referral: M54.16 (ICD-10-CM) - Lumbar radiculopathy   Evaluation Date: 10/25/2022  Authorization Period Expiration: 3/25/2023  Plan of Care Expiration: 1/25/2023  Progress Note Due: 1/5//2022  Visit # / Visits authorized: 13/20    FOTO: 1/3     Precautions: Standard      PTA Visit #: 0/5      Time In: 1015  Time Out: 1100  Total Billable Time: 45 minutes      SUBJECTIVE      Pt reports: she had some muscle soreness following the last session, but not the pain that goes down her leg. Patient reports no change in status at this time and is agreeable to today's treatment.      She was compliant with home exercise program  Response to previous treatment: tolerated well, less pain  Functional change: walking tolerance 25 min     Pain: 0/10  Location: bilateral low back and lower extremities       OBJECTIVE      LUMBAR SPINE AROM:   Flexion: WFL   Extension: 50% with pain   Left Rotation: 50%   Right Rotation: 50%      SEGMENTAL MOBILITY: min restriction throughout the lumbar spine in UPA and rotational glides at grade 2-3 T10-L4     LOWER EXTREMITY STRENGTH:    Left Right   Knee Flex 4+/5 4+/5   Knee Ext 4+/5 4+/5      Hip Flexor 4+/5 4+/5   Hip IR 4+/5 4+/5   Hip ER 4+/5 4/5        Treatment   Bolded activities performed today.     Ranjana received the treatments listed below:       therapeutic exercises to develop strength, endurance, ROM, flexibility, and core stabilization for 45 minutes including:  Including reassessment on 12/5/22:     Recumbent bike 6'  Gluteal sets with hips extended in supine x 10 with 5 sec  "hold  Shuttle standing hip extension on R & L 2x15  Prone hip extension with knee flexed 10 x 2 on R & L  Prone hip extension with knee extended x 10 on R & L  Supine hip thrusters with bolster under thighs 10 x 2  Clams with red TB 2x15 on R & L  Bridges with green TB - (hip abd, gluteal set, lift)  Frog Pump w/ BOSU x12x3"   Banded Pull Through x12 RTB  BOSU Hip Thrust 3x10 15#  Low Ab 1  2x15" (Supine Hip 90/90 Hip Flexion Isometric)  Low Ab 2 2x15" (Supine Hip 90/90 Hip ADD Isometric)  Low Ab 3 x10x3" (Supine Hip 90/90 Hip Flexion Isometric with Contralateral Deadbug)  TB Back Extension x20 STB     Hip Matrix   ABD 2x15 ea 25#  Ext 2x15 ea 25#        PPT 20x5"  Supine pulldowns with blue thera-tubing vs 6# weight isometric overhead with PPT 20x5"  Bridges with RTB around knees 2x10  LTR 10x5" with contralateral upper extremity flexion overhead  Table top position with tap downs 2x10  Side lying clamshells in modified side plank with RTB 2x10  Supine clams in up bridge position with clams 2 x10 bilateral vs unilateral reciprocal  Paloff Press with RTB 10x3"   Sit to stand with hip hinging 2x10 with 5# KB  Shuttle double leg press 2 black bands, 1 red band 3x10  Pushing sled with no weight with long strides for hip extension 1 lap  Lateral walking with YTB 2x20' (with SBA for safety due to pt reporting frequent fall unrelated to current condition)        Manual therapy x 00 min :  STM glut medius, psoas major origin  PA L 2 grade II  UPA T8-L3 grade II        Patient Education and Home Exercises      Home Exercises Provided and Patient Education Provided      Education provided:   - HEP  - Core activation with transitional movements     Written Home Exercises Provided: yes. Exercises were reviewed and Ranjana was able to demonstrate them prior to the end of the session.  Ranjana demonstrated good  understanding of the education provided. See EMR under Patient Instructions for exercises provided during therapy " sessions     ASSESSMENT      Ranjana presents to treatment with no pain. She reports muscle soreness following last session, but no increase in pain. Continued focus on glute activation. She was progressed to weighted BOSU hip thrust form bridges an tolerated activity well, she was able to perform good glute activation with verbal cueing.       Ranjana Is progressing well towards her goals.   Pt prognosis is Good.      Pt will continue to benefit from skilled outpatient physical therapy to address the deficits listed in the problem list box on initial evaluation, provide pt/family education and to maximize pt's level of independence in the home and community environment.      Pt's spiritual, cultural and educational needs considered and pt agreeable to plan of care and goals.     Anticipated Barriers for therapy: pain limiting      Goals:  Short Term Goals: 4 weeks   Patient will show 25% improvement in lumbar range of motion. Progressing MET  2. Patient will show 1/2 grade MMT improvement in hip strength MET  3. Patient will show increased step and stride length bilaterally throughout gait cycle. MET  4. Patient will comply with home exercise program at all times.  MET 12/5/22     Long Term Goals: 8 weeks   Patient will be able to climb and descend 2 flights of stairs without pain increasing.   2. Patent will be able to stand and walk for > 60 minutes without pain increasing. Progressing   3. Patient will be able to return to 60 minutes of Progressive strengthening exercise without pain increasing  4. 50% FOTO score improvement        PLAN      Continue to progress per patient tolerance as per original POC    Blair Abbasi, PT

## 2022-12-30 ENCOUNTER — CLINICAL SUPPORT (OUTPATIENT)
Dept: REHABILITATION | Facility: HOSPITAL | Age: 62
End: 2022-12-30
Payer: COMMERCIAL

## 2022-12-30 PROCEDURE — 97110 THERAPEUTIC EXERCISES: CPT | Mod: PO

## 2022-12-30 NOTE — PROGRESS NOTES
"[facility]   Physical Therapy Progress Note     Name: Ranjana Dominguez  Clinic Number: [id number]    Therapy Diagnosis:   No diagnosis found.    Physician: Tahira Mcmullen,*    Visit Date: 12/30/2022    Physician Orders: PT Eval and Treat   Medical Diagnosis from Referral: M54.16 (ICD-10-CM) - Lumbar radiculopathy   Evaluation Date: 10/25/2022  Authorization Period Expiration: 3/25/2023  Plan of Care Expiration: 1/25/2023  Progress Note Due: 1/5//2022  Visit # / Visits authorized: 13/20    FOTO: 1/3     Precautions: Standard      PTA Visit #: 0/5      Time In: 8:38  Time Out: 1100  Total Billable Time: 45 minutes      SUBJECTIVE      Pt reports: that she worked hard during her last session and is feeling some post exercise soreness.     She was compliant with home exercise program  Response to previous treatment: tolerated well, less pain  Functional change: walking tolerance 25 min     Pain: 0/10  Location: bilateral low back and lower extremities       OBJECTIVE      No objective measures were taken today.       Treatment   Bolded activities performed today.     Ranjana received the treatments listed below:       therapeutic exercises to develop strength, endurance, ROM, flexibility, and core stabilization for 45 minutes including:  Including reassessment on 12/5/22:     Recumbent bike 6'  Gluteal sets with hips extended in supine x 10 with 5 sec hold  Shuttle standing hip extension on R & L 2x15  Prone hip extension with knee flexed 10 x 2 on R & L  Prone hip extension with knee extended x 10 on R & L  Supine hip thrusters with bolster under thighs 10 x 2  Clams with red TB 2x15 on R & L  Bridges x 10  Bridges with green TB 10 x 2- (hip abd, gluteal set, lift)  Frog Pump w/ BOSU x12x3"   Banded Pull Through x12 RTB  BOSU Hip Thrust 3x10 15#  Low Ab 1  2x15" (Supine Hip 90/90 Hip Flexion Isometric)  Low Ab 2 2x15" (Supine Hip 90/90 Hip ADD Isometric)  Low Ab 3 " "x10x3" (Supine Hip 90/90 Hip Flexion Isometric with Contralateral Deadbug)  TB Back Extension x20 STB  Shuttle leg press 2 bands x 10, 3 bands 10 x 2  Hip Matrix   ABD 2x15 ea 25#  Ext 2x15 ea 25#        PPT 20x5"  Supine pulldowns with blue thera-tubing vs 6# weight isometric overhead with PPT 20x5"  Bridges with RTB around knees 2x10  LTR 10x5" with contralateral upper extremity flexion overhead  Table top position with tap downs 2x10  Side lying clamshells in modified side plank with RTB 2x10  Supine clams in up bridge position with clams 2 x10 bilateral vs unilateral reciprocal  Paloff Press with RTB 10x3"   Sit to stand with hip hinging 2x10 with 5# KB  Shuttle double leg press 2 black bands, 1 red band 3x10  Pushing sled with no weight with long strides for hip extension 1 lap  Lateral walking with YTB 2x20' (with SBA for safety due to pt reporting frequent fall unrelated to current condition)        Manual therapy x 00 min :  STM glut medius, psoas major origin  PA L 2 grade II  UPA T8-L3 grade II        Patient Education and Home Exercises      Home Exercises Provided and Patient Education Provided      Education provided:   - HEP  - Core activation with transitional movements     Written Home Exercises Provided: yes. Exercises were reviewed and Ranjana was able to demonstrate them prior to the end of the session.  Ranjana demonstrated good  understanding of the education provided. See EMR under Patient Instructions for exercises provided during therapy sessions     ASSESSMENT      Ranjana presents to treatment with no pain in her back and minimal "tingling" in the B buttocks.  T was able to tolerate all Tx activities performed today with no report of increased discomfort.  continue to progress trunk stabilization exercises.    Ranjana Is progressing well towards her goals.   Pt prognosis is Good.      Pt will continue to benefit from skilled outpatient physical therapy to address the deficits listed in " the problem list box on initial evaluation, provide pt/family education and to maximize pt's level of independence in the home and community environment.      Pt's spiritual, cultural and educational needs considered and pt agreeable to plan of care and goals.     Anticipated Barriers for therapy: pain limiting      Goals:  Short Term Goals: 4 weeks   Patient will show 25% improvement in lumbar range of motion. Progressing MET  2. Patient will show 1/2 grade MMT improvement in hip strength MET  3. Patient will show increased step and stride length bilaterally throughout gait cycle. MET  4. Patient will comply with home exercise program at all times.  MET 12/5/22     Long Term Goals: 8 weeks   Patient will be able to climb and descend 2 flights of stairs without pain increasing.   2. Patent will be able to stand and walk for > 60 minutes without pain increasing. Progressing   3. Patient will be able to return to 60 minutes of Progressive strengthening exercise without pain increasing  4. 50% FOTO score improvement        PLAN      Continue to progress per patient tolerance as per original POC    Miles Horan, PT

## 2023-01-04 ENCOUNTER — CLINICAL SUPPORT (OUTPATIENT)
Dept: REHABILITATION | Facility: HOSPITAL | Age: 63
End: 2023-01-04
Attending: INTERNAL MEDICINE
Payer: COMMERCIAL

## 2023-01-04 DIAGNOSIS — Z74.09 DECREASED MOBILITY AND ENDURANCE: ICD-10-CM

## 2023-01-04 DIAGNOSIS — M54.16 LUMBAR RADICULOPATHY: Primary | ICD-10-CM

## 2023-01-04 DIAGNOSIS — R53.1 WEAKNESS: ICD-10-CM

## 2023-01-04 PROCEDURE — 97110 THERAPEUTIC EXERCISES: CPT | Mod: PO,CQ

## 2023-01-04 NOTE — PROGRESS NOTES
"[facility]   Physical Therapy Progress Note     Name: Ranjana Duquebert  Clinic Number: [id number]    Therapy Diagnosis:   Encounter Diagnoses   Name Primary?    Lumbar radiculopathy Yes    Weakness     Decreased mobility and endurance        Physician: Tahira Mcmullen,*    Visit Date: 1/4/2023    Physician Orders: PT Eval and Treat   Medical Diagnosis from Referral: M54.16 (ICD-10-CM) - Lumbar radiculopathy   Evaluation Date: 10/25/2022  Authorization Period Expiration: 12/31/2023  Plan of Care Expiration: 1/25/2023  Progress Note Due: 1/5//2022  Visit # / Visits authorized: 1/20    FOTO: 1/3     Precautions: Standard      PTA Visit #: 1/5      Time In: 2:00 pm  Time Out: 2:45 pm  Total Billable Time: 45 minutes      SUBJECTIVE      Pt reports: she is walking longer distances, but has not been able to walk as fast as she was previously     She was compliant with home exercise program  Response to previous treatment: tolerated well, less pain  Functional change: walking tolerance 25 min     Pain: 0/10  Location: bilateral low back and lower extremities       OBJECTIVE      No objective measures were taken today.       Treatment   Bolded activities performed today.     Ranjana received the treatments listed below:       therapeutic exercises to develop strength, endurance, ROM, flexibility, and core stabilization for 45 minutes including:  Including reassessment on 12/5/22:     Recumbent bike 6'  Gluteal sets with hips extended in supine x 10 with 5 sec hold  Shuttle standing hip extension on R & L 2x15  Prone hip extension with knee flexed 10 x 2 on R & L  Prone hip extension with knee extended x 10 on R & L  Supine hip thrusters with bolster under thighs 10 x 2  Clams with green TB 2x15 on R & L  Bridges x 10  Bridges with green TB 10 x 2- (hip abd, gluteal set, lift)  Frog Pump w/ BOSU x12x3"   Banded Pull Through x12 RTB  BOSU Hip Thrust 3x10 15#  Low Ab 1  2x15" " "(Supine Hip 90/90 Hip Flexion Isometric)  Low Ab 2 2x15" (Supine Hip 90/90 Hip ADD Isometric)  Low Ab 3 x10x3" (Supine Hip 90/90 Hip Flexion Isometric with Contralateral Deadbug)  TB Back Extension x20 STB  Shuttle leg press 3 bands 3 x 10  Hip Matrix   ABD 2x15 ea 25#  Ext 2x15 ea 25#        PPT 20x5"  Supine pulldowns with blue thera-tubing vs 6# weight isometric overhead with PPT 20x5"  Bridges with RTB around knees 2x10  LTR 10x5" with contralateral upper extremity flexion overhead  Table top position with tap downs 2x10  Side lying clamshells in modified side plank with RTB 2x10  Supine clams in up bridge position with clams 2 x10 bilateral vs unilateral reciprocal  Paloff Press with RTB 10x3"   Sit to stand with hip hinging 2x10 with 5# KB  Shuttle double leg press 2 black bands, 1 red band 3x10  Pushing sled with no weight with long strides for hip extension 1 lap  Lateral walking with YTB 2x20' (with SBA for safety due to pt reporting frequent fall unrelated to current condition)        Manual therapy x 00 min :  STM glut medius, psoas major origin  PA L 2 grade II  UPA T8-L3 grade II        Patient Education and Home Exercises      Home Exercises Provided and Patient Education Provided      Education provided:   - HEP  - Core activation with transitional movements     Written Home Exercises Provided: yes. Exercises were reviewed and Ranjana was able to demonstrate them prior to the end of the session.  Ranjana demonstrated good  understanding of the education provided. See EMR under Patient Instructions for exercises provided during therapy sessions     ASSESSMENT      Ranjana presents to treatment with no pain or tingling in B buttocks. She reports minimal symptoms since last session. Good tolerance to exercises with improved glute activation during bridges and hip thrusts today. No pain reported with any exercises today. Continue to progress as tolerated.     Ranjana Is progressing well towards her " goals.   Pt prognosis is Good.      Pt will continue to benefit from skilled outpatient physical therapy to address the deficits listed in the problem list box on initial evaluation, provide pt/family education and to maximize pt's level of independence in the home and community environment.      Pt's spiritual, cultural and educational needs considered and pt agreeable to plan of care and goals.     Anticipated Barriers for therapy: pain limiting      Goals:  Short Term Goals: 4 weeks   Patient will show 25% improvement in lumbar range of motion. Progressing MET  2. Patient will show 1/2 grade MMT improvement in hip strength MET  3. Patient will show increased step and stride length bilaterally throughout gait cycle. MET  4. Patient will comply with home exercise program at all times.  MET 12/5/22     Long Term Goals: 8 weeks   Patient will be able to climb and descend 2 flights of stairs without pain increasing.   2. Patent will be able to stand and walk for > 60 minutes without pain increasing. Progressing   3. Patient will be able to return to 60 minutes of Progressive strengthening exercise without pain increasing  4. 50% FOTO score improvement        PLAN      Continue to progress per patient tolerance as per original POC    Devora Murphy, PTA

## 2023-01-05 ENCOUNTER — APPOINTMENT (OUTPATIENT)
Dept: RADIOLOGY | Facility: OTHER | Age: 63
End: 2023-01-05
Attending: INTERNAL MEDICINE
Payer: COMMERCIAL

## 2023-01-05 VITALS — WEIGHT: 112 LBS | BODY MASS INDEX: 19.84 KG/M2 | HEIGHT: 63 IN

## 2023-01-05 DIAGNOSIS — Z12.31 VISIT FOR SCREENING MAMMOGRAM: ICD-10-CM

## 2023-01-05 PROCEDURE — 77067 SCR MAMMO BI INCL CAD: CPT | Mod: TC,PN

## 2023-01-05 PROCEDURE — 77067 SCR MAMMO BI INCL CAD: CPT | Mod: 26,,, | Performed by: RADIOLOGY

## 2023-01-05 PROCEDURE — 77063 MAMMO DIGITAL SCREENING BILAT WITH TOMO: ICD-10-PCS | Mod: 26,,, | Performed by: RADIOLOGY

## 2023-01-05 PROCEDURE — 77063 BREAST TOMOSYNTHESIS BI: CPT | Mod: 26,,, | Performed by: RADIOLOGY

## 2023-01-05 PROCEDURE — 77067 MAMMO DIGITAL SCREENING BILAT WITH TOMO: ICD-10-PCS | Mod: 26,,, | Performed by: RADIOLOGY

## 2023-01-06 ENCOUNTER — CLINICAL SUPPORT (OUTPATIENT)
Dept: REHABILITATION | Facility: HOSPITAL | Age: 63
End: 2023-01-06
Payer: COMMERCIAL

## 2023-01-06 DIAGNOSIS — M54.16 LUMBAR RADICULOPATHY: Primary | ICD-10-CM

## 2023-01-06 PROCEDURE — 97110 THERAPEUTIC EXERCISES: CPT | Mod: PO,CQ

## 2023-01-06 NOTE — PROGRESS NOTES
"[facility]   Physical Therapy Progress Note     Name: Ranjana Duquebert  Clinic Number: [id number]    Therapy Diagnosis:   Encounter Diagnosis   Name Primary?    Lumbar radiculopathy Yes         Physician: Tahira Mcmullen,*    Visit Date: 1/6/2023    Physician Orders: PT Eval and Treat   Medical Diagnosis from Referral: M54.16 (ICD-10-CM) - Lumbar radiculopathy   Evaluation Date: 10/25/2022  Authorization Period Expiration: 12/31/2023  Plan of Care Expiration: 1/25/2023  Progress Note Due: 1/5//2022  Visit # / Visits authorized: 2/20    FOTO: 1/3     Precautions: Standard      PTA Visit #: 2/5      Time In: 12:15 pm  Time Out: 1:00 pm  Total Billable Time: 45 minutes      SUBJECTIVE      Pt reports: she has started to increase her speed when walking     She was compliant with home exercise program  Response to previous treatment: tolerated well, less pain  Functional change: walking tolerance 25 min     Pain: 0/10  Location: bilateral low back and lower extremities       OBJECTIVE      No objective measures were taken today.       Treatment   Bolded activities performed today.     Ranjana received the treatments listed below:       therapeutic exercises to develop strength, endurance, ROM, flexibility, and core stabilization for 45 minutes including:  Including reassessment on 12/5/22:     Recumbent bike 6'  Gluteal sets with hips extended in supine x 10 with 5 sec hold  Shuttle standing hip extension on R & L 2x15 1 red band  Prone hip extension with knee flexed 10 x 2 on R & L  Prone hip extension with knee extended x 10 on R & L  Supine hip thrusters with bolster under thighs 10 x 2  Clams with green TB 2x15 on R & L  Bridges x 10  Bridges with green TB 10 x 2- (hip abd, gluteal set, lift)  Frog Pump w/ BOSU x12x3"   Banded Pull Through x12 RTB  BOSU Hip Thrust 3x10 15#  Low Ab 1  2x15" (Supine Hip 90/90 Hip Flexion Isometric)  Low Ab 2 2x15" (Supine Hip 90/90 Hip " "ADD Isometric)  Low Ab 3 x10x3" (Supine Hip 90/90 Hip Flexion Isometric with Contralateral Deadbug)  TB Back Extension x20 STB  Shuttle leg press 3 bands 3 x 10  Pallof press with step outs x10  Dead lifts with dowel x10  Squats with dowel on shoulders x10  Hip Matrix   ABD 2x15 ea 25#  Ext 2x15 ea 25#        PPT 20x5"  Supine pulldowns with blue thera-tubing vs 6# weight isometric overhead with PPT 20x5"  Bridges with RTB around knees 2x10  LTR 10x5" with contralateral upper extremity flexion overhead  Table top position with tap downs 2x10  Side lying clamshells in modified side plank with RTB 2x10  Supine clams in up bridge position with clams 2 x10 bilateral vs unilateral reciprocal  Sit to stand with hip hinging 2x10 with 5# KB  Shuttle double leg press 2 black bands, 1 red band 3x10  Pushing sled with no weight with long strides for hip extension 1 lap  Lateral walking with YTB 2x20' (with SBA for safety due to pt reporting frequent fall unrelated to current condition)        Manual therapy x 00 min :  STM glut medius, psoas major origin  PA L 2 grade II  UPA T8-L3 grade II        Patient Education and Home Exercises      Home Exercises Provided and Patient Education Provided      Education provided:   - HEP  - Core activation with transitional movements     Written Home Exercises Provided: yes. Exercises were reviewed and Ranjana was able to demonstrate them prior to the end of the session.  Ranjana demonstrated good  understanding of the education provided. See EMR under Patient Instructions for exercises provided during therapy sessions     ASSESSMENT      Ranjana continues with no pain or tingling and improving tolerance to walking. She reports she wants to return to body pump classes. Added dead lifts and squats to prepare her for attempting doing a class. She completed with no resistance with no pain or adverse effects. Recommended she attempt exercises in class with no resistance to start. Continue " to progress as tolerated.     Ranjana Is progressing well towards her goals.   Pt prognosis is Good.      Pt will continue to benefit from skilled outpatient physical therapy to address the deficits listed in the problem list box on initial evaluation, provide pt/family education and to maximize pt's level of independence in the home and community environment.      Pt's spiritual, cultural and educational needs considered and pt agreeable to plan of care and goals.     Anticipated Barriers for therapy: pain limiting      Goals:  Short Term Goals: 4 weeks   Patient will show 25% improvement in lumbar range of motion. Progressing MET  2. Patient will show 1/2 grade MMT improvement in hip strength MET  3. Patient will show increased step and stride length bilaterally throughout gait cycle. MET  4. Patient will comply with home exercise program at all times.  MET 12/5/22     Long Term Goals: 8 weeks   Patient will be able to climb and descend 2 flights of stairs without pain increasing.   2. Patent will be able to stand and walk for > 60 minutes without pain increasing. Progressing   3. Patient will be able to return to 60 minutes of Progressive strengthening exercise without pain increasing  4. 50% FOTO score improvement        PLAN      Continue to progress per patient tolerance as per original POC    Devora Murphy, PTA

## 2023-01-11 ENCOUNTER — CLINICAL SUPPORT (OUTPATIENT)
Dept: REHABILITATION | Facility: HOSPITAL | Age: 63
End: 2023-01-11
Attending: INTERNAL MEDICINE
Payer: COMMERCIAL

## 2023-01-11 DIAGNOSIS — R53.1 WEAKNESS: ICD-10-CM

## 2023-01-11 DIAGNOSIS — Z74.09 DECREASED MOBILITY AND ENDURANCE: Primary | ICD-10-CM

## 2023-01-11 DIAGNOSIS — M54.16 LUMBAR RADICULOPATHY: ICD-10-CM

## 2023-01-11 PROCEDURE — 97140 MANUAL THERAPY 1/> REGIONS: CPT | Mod: PO

## 2023-01-11 PROCEDURE — 97110 THERAPEUTIC EXERCISES: CPT | Mod: PO

## 2023-01-11 NOTE — PROGRESS NOTES
"[facility]   Physical Therapy Progress Note     Name: Ranjana Dominguez  Clinic Number: [id number]    Therapy Diagnosis:   Encounter Diagnoses   Name Primary?    Decreased mobility and endurance Yes    Weakness     Lumbar radiculopathy            Physician: Tahira Mcmullen,*    Visit Date: 1/11/2023    Physician Orders: PT Eval and Treat   Medical Diagnosis from Referral: M54.16 (ICD-10-CM) - Lumbar radiculopathy   Evaluation Date: 10/25/2022  Authorization Period Expiration: 12/31/2023  Plan of Care Expiration: 1/25/2023  Progress Note Due: 2/11//2023  Visit # / Visits authorized: 3/20    FOTO: 1/3     Precautions: Standard      PTA Visit #: 2/5      Time In: 1:15 pm  Time Out: 2:00 pm  Total Billable Time: 45 minutes TE 2 MT 1     SUBJECTIVE      Pt reports: she has started to increase her speed when walking     She was compliant with home exercise program  Response to previous treatment: tolerated well, less pain  Functional change: walking tolerance 25 min     Pain: 0/10  Location: bilateral low back and lower extremities       OBJECTIVE      No objective measures were taken today.       Treatment   Bolded activities performed today.     Ranjana received the treatments listed below:       therapeutic exercises to develop strength, endurance, ROM, flexibility, and core stabilization for 30 minutes including:  I     Recumbent bike 6'  Gluteal sets with hips extended in supine x 10 with 5 sec hold  Shuttle standing hip extension on R & L 2x15 1 red band  Prone hip extension with knee flexed 10 x 2 on R & L  Prone hip extension with knee extended x 10 on R & L  Supine hip thrusters with bolster under thighs 10 x 2  Clams with green TB 2x15 on R & L  Bridges x 10  Bridges with green TB 10 x 2- (hip abd, gluteal set, lift)  Frog Pump w/ BOSU x12x3"   Banded Pull Through x12 RTB  BOSU Hip Thrust 3x10 15#  Low Ab 1  2x15" (Supine Hip 90/90 Hip Flexion Isometric)  Low Ab 2 " "2x15" (Supine Hip 90/90 Hip ADD Isometric)  Low Ab 3 x10x3" (Supine Hip 90/90 Hip Flexion Isometric with Contralateral Deadbug)  TB Back Extension x20 STB  Shuttle leg press 3 bands 3 x 10  Pallof press with step outs x10  Open book open hips and then closed hip and then in semisquat golf swing plane position x 10 bilaterally.  Pallof press x 10 in each position, standing extended, semisquat and lunge position with each leg. With red theratube    [place] test stretch with SKTC and abduction on contralateral leg to support lumbar alignment    Dead lifts with dowel x10  Squats with dowel on shoulders x10  Hip Matrix   ABD 2x15 ea 25#  Ext 2x15 ea 25#        PPT 20x5"  Supine pulldowns with blue thera-tubing vs 6# weight isometric overhead with PPT 20x5"  Bridges with RTB around knees 2x10  LTR 10x5" with contralateral upper extremity flexion overhead  Table top position with tap downs 2x10  Side lying clamshells in modified side plank with RTB 2x10  Supine clams in up bridge position with clams 2 x10 bilateral vs unilateral reciprocal  Sit to stand with hip hinging 2x10 with 5# KB  Shuttle double leg press 2 black bands, 1 red band 3x10  Pushing sled with no weight with long strides for hip extension 1 lap  Lateral walking with YTB 2x20' (with SBA for safety due to pt reporting frequent fall unrelated to current condition)        Manual therapy x 15 min :  STM glut medius, psoas major origin, iliacus, adductor christopher and brevis.  PA L 2 grade II  UPA T8-L3 grade II        Patient Education and Home Exercises      Home Exercises Provided and Patient Education Provided      Education provided:   - HEP  - Core activation with transitional movements     Written Home Exercises Provided: yes. Exercises were reviewed and Ranjana was able to demonstrate them prior to the end of the session.  Ranjana demonstrated good  understanding of the education provided. See EMR under Patient Instructions for exercises provided during " therapy sessions     ASSESSMENT      Ranjana continues with no pain with increased workload and helping a friend packing. Pt with proximal hip adductors overuse and guarding along with L 3 to L 4 level psoas origin R>L.  Pt tolerated session with increased workload and modification of existing exercise to address instability and stabilizers along with progression towards thoracolumbar stabilization and mobility to allow return to recreational golf with work in plane of motion of golf swing.      Ranjana Is progressing well towards her goals.   Pt prognosis is Good.      Pt will continue to benefit from skilled outpatient physical therapy to address the deficits listed in the problem list box on initial evaluation, provide pt/family education and to maximize pt's level of independence in the home and community environment.      Pt's spiritual, cultural and educational needs considered and pt agreeable to plan of care and goals.     Anticipated Barriers for therapy: pain limiting      Goals:  Short Term Goals: 4 weeks   Patient will show 25% improvement in lumbar range of motion. Progressing MET  2. Patient will show 1/2 grade MMT improvement in hip strength MET  3. Patient will show increased step and stride length bilaterally throughout gait cycle. MET  4. Patient will comply with home exercise program at all times.  MET 12/5/22     Long Term Goals: 8 weeks   Patient will be able to climb and descend 2 flights of stairs without pain increasing.   2. Patent will be able to stand and walk for > 60 minutes without pain increasing. Progressing   3. Patient will be able to return to 60 minutes of Progressive strengthening exercise without pain increasing. Progressing 1/11/23  4. 50% FOTO score improvement        PLAN      Continue to progress per patient tolerance as per original POC    Chantal Farris, PT

## 2023-01-18 ENCOUNTER — CLINICAL SUPPORT (OUTPATIENT)
Dept: REHABILITATION | Facility: HOSPITAL | Age: 63
End: 2023-01-18
Attending: INTERNAL MEDICINE
Payer: COMMERCIAL

## 2023-01-18 DIAGNOSIS — R53.1 WEAKNESS: ICD-10-CM

## 2023-01-18 DIAGNOSIS — M54.16 LUMBAR RADICULOPATHY: Primary | ICD-10-CM

## 2023-01-18 DIAGNOSIS — Z74.09 DECREASED MOBILITY AND ENDURANCE: ICD-10-CM

## 2023-01-18 PROCEDURE — 97110 THERAPEUTIC EXERCISES: CPT | Mod: PO

## 2023-01-18 PROCEDURE — 97140 MANUAL THERAPY 1/> REGIONS: CPT | Mod: PO

## 2023-01-18 NOTE — PROGRESS NOTES
"[facility]   Physical Therapy Progress Note     Name: Ranjana Dominguez  Clinic Number: [id number]    Therapy Diagnosis:   Encounter Diagnoses   Name Primary?    Lumbar radiculopathy Yes    Weakness     Decreased mobility and endurance          Physician: Tahira Mcmullen,*    Visit Date: 1/18/2023    Physician Orders: PT Eval and Treat   Medical Diagnosis from Referral: M54.16 (ICD-10-CM) - Lumbar radiculopathy   Evaluation Date: 10/25/2022  Authorization Period Expiration: 12/31/2023  Plan of Care Expiration: 1/25/2023  Progress Note Due: 2/11//2023  Visit # / Visits authorized: 4/20    FOTO: 1/3     Precautions: Standard      PTA Visit #: 2/5      Time In: 1145 am  Time Out: 1230 pm  Total Billable Time: 45 minutes TE 2 MT 1     SUBJECTIVE      Pt reports: no issues  with session and carryover with HEP last time.      She was compliant with home exercise program  Response to previous treatment: tolerated well, less pain  Functional change: walking tolerance 25 min     Pain: 0/10  Location: bilateral low back and lower extremities       OBJECTIVE      No objective measures were taken today.       Treatment   Bolded activities performed today.     Ranjana received the treatments listed below:       therapeutic exercises to develop strength, endurance, ROM, flexibility, and core stabilization for 25 minutes including:    Recumbent bike 6'  Gluteal sets with hips extended in supine x 10 with 5 sec hold  Shuttle standing hip extension on R & L 2x15 1 red band  Prone hip extension with knee flexed 10 x 2 on R & L  Prone hip extension with knee extended x 10 on R & L  Supine hip thrusters with bolster under thighs 10 x 2  Clams with green TB 2x15 on R & L  Bridges x 10  Bridges with green TB 10 x 2- (hip abd, gluteal set, lift)  Frog Pump w/ BOSU x12x3"   Banded Pull Through x12 RTB  BOSU Hip Thrust 3x10 15#  Low Ab 1  2x15" (Supine Hip 90/90 Hip Flexion Isometric)  Low Ab " "2 2x15" (Supine Hip 90/90 Hip ADD Isometric)  Low Ab 3 x10x3" (Supine Hip 90/90 Hip Flexion Isometric with Contralateral Deadbug)  TB Back Extension x20 STB  Shuttle leg press 3 bands 3 x 10  Sit to stand x 15 with eccentric control and 15#  Deadlift with 15# and x 15 rep.  Pallof press with step outs x10 in semisquat position.   Open book open hips and then closed hip and then in semisquat golf swing plane position x 10 bilaterally.  Pallof press x 10 in each position, standing extended, semisquat and lunge position with each leg. With red theratube hand position in downward motion to mimic stance positioning for golf.     Clinton test stretch with SKTC and abduction on contralateral leg to support lumbar alignment  3x 5 golf swings with cues at 66-75% speed and range     Dead lifts with dowel x10  Squats with dowel on shoulders x10  Hip Matrix   ABD 2x15 ea 25#  Ext 2x15 ea 25#        PPT 20x5"  Supine pulldowns with blue thera-tubing vs 6# weight isometric overhead with PPT 20x5"  Bridges with RTB around knees 2x10  LTR 10x5" with contralateral upper extremity flexion overhead  Table top position with tap downs 2x10  Side lying clamshells in modified side plank with RTB 2x10  Supine clams in up bridge position with clams 2 x10 bilateral vs unilateral reciprocal  Sit to stand with hip hinging 2x10 with 5# KB  Shuttle double leg press 2 black bands, 1 red band 3x10  Pushing sled with no weight with long strides for hip extension 1 lap  Lateral walking with YTB 2x20' (with SBA for safety due to pt reporting frequent fall unrelated to current condition)        Manual therapy x 15 min :  STM glut medius, psoas major origin, iliacus, adductor christopher and brevis + HS origin  Long axis traction supine and in sidelying with counter rotation and hip extension  Short axis traction in supine.   PA L 2 grade II  UPA T8-L3 grade II        Patient Education and Home Exercises      Home Exercises Provided and Patient Education " Provided      Education provided:   - HEP  - Core activation with transitional movements     Written Home Exercises Provided: yes. Exercises were reviewed and Ranjana was able to demonstrate them prior to the end of the session.  Ranjana demonstrated good  understanding of the education provided. See EMR under Patient Instructions for exercises provided during therapy sessions     ASSESSMENT      Ranjana continues with no pain with increased workload and helping a friend packing. Pt with proximal hip adductors overuse and guarding along with L 3 to L 4 level psoas origin R>L.  Pt tolerated session with increased workload to progress into golf swing at 66-75% range and speed without issues.      Ranjana Is progressing well towards her goals.   Pt prognosis is Good.      Pt will continue to benefit from skilled outpatient physical therapy to address the deficits listed in the problem list box on initial evaluation, provide pt/family education and to maximize pt's level of independence in the home and community environment.      Pt's spiritual, cultural and educational needs considered and pt agreeable to plan of care and goals.     Anticipated Barriers for therapy: pain limiting      Goals:  Short Term Goals: 4 weeks   Patient will show 25% improvement in lumbar range of motion. Progressing MET  2. Patient will show 1/2 grade MMT improvement in hip strength MET  3. Patient will show increased step and stride length bilaterally throughout gait cycle. MET  4. Patient will comply with home exercise program at all times.  MET 12/5/22     Long Term Goals: 8 weeks   Patient will be able to climb and descend 2 flights of stairs without pain increasing.   2. Patent will be able to stand and walk for > 60 minutes without pain increasing. Progressing  1/18/23  3. Patient will be able to return to 60 minutes of Progressive strengthening exercise without pain increasing. Progressing 1/18/23  4. 50% FOTO score improvement         PLAN      Continue to progress per patient tolerance as per original POC    Chantal Farris, PT

## 2023-01-20 ENCOUNTER — CLINICAL SUPPORT (OUTPATIENT)
Dept: REHABILITATION | Facility: HOSPITAL | Age: 63
End: 2023-01-20
Payer: COMMERCIAL

## 2023-01-20 DIAGNOSIS — Z74.09 DECREASED MOBILITY AND ENDURANCE: ICD-10-CM

## 2023-01-20 DIAGNOSIS — R53.1 WEAKNESS: ICD-10-CM

## 2023-01-20 DIAGNOSIS — M54.16 LUMBAR RADICULOPATHY: Primary | ICD-10-CM

## 2023-01-20 PROCEDURE — 97110 THERAPEUTIC EXERCISES: CPT | Mod: PO,CQ

## 2023-01-20 NOTE — PROGRESS NOTES
"[facility]   Physical Therapy Progress Note     Name: Ranjana Dominguez  Clinic Number: [id number]    Therapy Diagnosis:   Encounter Diagnoses   Name Primary?    Lumbar radiculopathy Yes    Weakness     Decreased mobility and endurance            Physician: Tahira Mcmullen,*    Visit Date: 1/20/2023    Physician Orders: PT Eval and Treat   Medical Diagnosis from Referral: M54.16 (ICD-10-CM) - Lumbar radiculopathy   Evaluation Date: 10/25/2022  Authorization Period Expiration: 12/31/2023  Plan of Care Expiration: 1/25/2023  Progress Note Due: 2/11//2023  Visit # / Visits authorized: 5/20    FOTO: 1/3     Precautions: Standard      PTA Visit #: 1/5      Time In: 12:15 pm  Time Out: 1:02 pm  Total Billable Time: 47 minutes TE 3     SUBJECTIVE      Pt reports: she had a little bit of soreness after the first day of golf swing/rotation movements, but she had no pain after last session.      She was compliant with home exercise program  Response to previous treatment: tolerated well, less pain  Functional change: walking tolerance 25 min     Pain: 0/10  Location: bilateral low back and lower extremities       OBJECTIVE      No objective measures were taken today.       Treatment   Bolded activities performed today.     Ranjana received the treatments listed below:       therapeutic exercises to develop strength, endurance, ROM, flexibility, and core stabilization for 47 minutes including:    Recumbent bike 6'  Gluteal sets with hips extended in supine x 10 with 5 sec hold  Shuttle standing hip extension on R & L 2x15 1 red band  Prone hip extension with knee flexed 10 x 2 on R & L  Prone hip extension with knee extended x 10 on R & L  Supine hip thrusters with bolster under thighs 10 x 2  Clams with green TB 2x15 on R & L  Bridges x 10  Bridges with green TB 15 x 2- (hip abd, gluteal set, lift)  Frog Pump w/ BOSU x12x3"   Banded Pull Through x12 RTB  BOSU Hip Thrust 3x10 " "15#  Low Ab 1  2x15" (Supine Hip 90/90 Hip Flexion Isometric)  Low Ab 2 2x15" (Supine Hip 90/90 Hip ADD Isometric)  Low Ab 3 x10x3" (Supine Hip 90/90 Hip Flexion Isometric with Contralateral Deadbug)  TB Back Extension x20 STB  Shuttle leg press 3 bands 3 x 10  Sit to stand x 15 with eccentric control and 15#  Deadlift with 15# and x 15 rep.  Pallof press with step outs x10 in semisquat position.   Open book open hips and then closed hip and then in semisquat golf swing plane position x 10 bilaterally.  Pallof press x 10 in each position, standing extended, semisquat and lunge position with each leg. With red theratube hand position in downward motion to mimic stance positioning for golf.     [place]inton test stretch with SKTC and abduction on contralateral leg to support lumbar alignment  3x 5 golf swings with cues at 66-75% speed and range     Dead lifts with dowel x10  Squats with dowel on shoulders x10  Hip Matrix   ABD 2x15 ea 25#  Ext 2x15 ea 25#        PPT 20x5"  Supine pulldowns with blue thera-tubing vs 6# weight isometric overhead with PPT 20x5"  Bridges with RTB around knees 2x10  LTR 10x5" with contralateral upper extremity flexion overhead  Table top position with tap downs 2x10  Side lying clamshells in modified side plank with RTB 2x10  Supine clams in up bridge position with clams 2 x10 bilateral vs unilateral reciprocal  Sit to stand with hip hinging 2x10 with 5# KB  Shuttle double leg press 2 black bands, 1 red band 3x10  Pushing sled with no weight with long strides for hip extension 1 lap  Lateral walking with YTB 2x20' (with SBA for safety due to pt reporting frequent fall unrelated to current condition)        Manual therapy x 00 min :  STM glut medius, psoas major origin, iliacus, adductor christopher and brevis + HS origin  Long axis traction supine and in sidelying with counter rotation and hip extension  Short axis traction in supine.   PA L 2 grade II  UPA T8-L3 grade II        Patient Education and " Home Exercises      Home Exercises Provided and Patient Education Provided      Education provided:   - HEP  - Core activation with transitional movements     Written Home Exercises Provided: yes. Exercises were reviewed and Ranjana was able to demonstrate them prior to the end of the session.  Ranjana demonstrated good  understanding of the education provided. See EMR under Patient Instructions for exercises provided during therapy sessions     ASSESSMENT      Ranjana presents to treatment with no pain. Good tolerance to golf swing and rotational activities with no increase in pain. Also focused on glute activation/strength. Minimal cueing required for technique with patient having improved glute activation with all activities. Continue to progress as tolerated.      Ranjana Is progressing well towards her goals.   Pt prognosis is Good.      Pt will continue to benefit from skilled outpatient physical therapy to address the deficits listed in the problem list box on initial evaluation, provide pt/family education and to maximize pt's level of independence in the home and community environment.      Pt's spiritual, cultural and educational needs considered and pt agreeable to plan of care and goals.     Anticipated Barriers for therapy: pain limiting      Goals:  Short Term Goals: 4 weeks   Patient will show 25% improvement in lumbar range of motion. Progressing MET  2. Patient will show 1/2 grade MMT improvement in hip strength MET  3. Patient will show increased step and stride length bilaterally throughout gait cycle. MET  4. Patient will comply with home exercise program at all times.  MET 12/5/22     Long Term Goals: 8 weeks   Patient will be able to climb and descend 2 flights of stairs without pain increasing.   2. Patent will be able to stand and walk for > 60 minutes without pain increasing. Progressing  1/18/23  3. Patient will be able to return to 60 minutes of Progressive strengthening exercise  without pain increasing. Progressing 1/18/23  4. 50% FOTO score improvement        PLAN      Continue to progress per patient tolerance as per original POC    Devora Murphy PTA

## 2023-01-25 ENCOUNTER — CLINICAL SUPPORT (OUTPATIENT)
Dept: REHABILITATION | Facility: HOSPITAL | Age: 63
End: 2023-01-25
Payer: COMMERCIAL

## 2023-01-25 ENCOUNTER — HOSPITAL ENCOUNTER (OUTPATIENT)
Dept: RADIOLOGY | Facility: HOSPITAL | Age: 63
Discharge: HOME | End: 2023-01-25
Attending: ORTHOPAEDIC SURGERY
Payer: COMMERCIAL

## 2023-01-25 ENCOUNTER — PATIENT MESSAGE (OUTPATIENT)
Dept: ORTHOPEDICS | Facility: CLINIC | Age: 63
End: 2023-01-25
Payer: COMMERCIAL

## 2023-01-25 DIAGNOSIS — M54.16 LUMBAR RADICULOPATHY: Primary | ICD-10-CM

## 2023-01-25 DIAGNOSIS — M89.9 BONE LESION: ICD-10-CM

## 2023-01-25 DIAGNOSIS — R53.1 WEAKNESS: ICD-10-CM

## 2023-01-25 DIAGNOSIS — Z74.09 DECREASED MOBILITY AND ENDURANCE: ICD-10-CM

## 2023-01-25 PROCEDURE — 72148 MRI LUMBAR SPINE W/O DYE: CPT | Mod: 26,,, | Performed by: RADIOLOGY

## 2023-01-25 PROCEDURE — 72148 MRI LUMBAR SPINE W/O DYE: CPT | Mod: TC

## 2023-01-25 PROCEDURE — 72148 MRI LUMBAR SPINE WITHOUT CONTRAST: ICD-10-PCS | Mod: 26,,, | Performed by: RADIOLOGY

## 2023-01-25 PROCEDURE — 97110 THERAPEUTIC EXERCISES: CPT | Mod: PO

## 2023-01-26 NOTE — PROGRESS NOTES
[facility]   Physical Therapy Progress/Discharge Note     Name: Ranjana Dominguez  Clinic Number: [id number]    Therapy Diagnosis:   Encounter Diagnoses   Name Primary?    Lumbar radiculopathy Yes    Decreased mobility and endurance     Weakness            Physician: Tahira Mcmullen,*    Visit Date: 1/25/2023    Physician Orders: PT Eval and Treat   Medical Diagnosis from Referral: M54.16 (ICD-10-CM) - Lumbar radiculopathy   Evaluation Date: 10/25/2022  Authorization Period Expiration: 12/31/2023  Plan of Care Expiration: 1/25/2023  Progress Note Due: 2/11//2023  Visit # / Visits authorized: 5/20    FOTO: 1/3     Precautions: Standard      PTA Visit #: 1/5      Time In: 1150   Time Out: 1230  Total Billable Time: 40 min     SUBJECTIVE      Pt reports: her pain is minimal and she feels ready to retunr to golf and continue to manage progress via HEP. Patient is agreeable to treatment and DC today.      She was compliant with home exercise program  Response to previous treatment: tolerated well, less pain  Functional change: walking tolerance 25 min     Pain: 0/10  Location: bilateral low back and lower extremities       OBJECTIVE      LUMBAR SPINE AROM:   Flexion: WFL   Extension: 80%   Left Rotation: WFL   Right Rotation: WFL      SEGMENTAL MOBILITY: min restriction throughout the lumbar spine in UPA and rotational glides at grade 2-3 T10-L4     LOWER EXTREMITY STRENGTH:    Left Right   Knee Flex 5/5 5/5   Knee Ext 5/5 5/5      Hip Flexor 5/5 5/5   Hip IR 5/5 5/5   Hip ER 5/5 4+/5          Treatment   Bolded activities performed today.     Ranjana received the treatments listed below:       therapeutic exercises to develop strength, endurance, ROM, flexibility, and core stabilization for 47 minutes including:    Recumbent bike 6'  Gluteal sets with hips extended in supine x 10 with 5 sec hold  Shuttle standing hip extension on R & L 2x15 1 red band  Prone hip extension  "with knee flexed 10 x 2 on R & L  Prone hip extension with knee extended x 10 on R & L  Supine hip thrusters with bolster under thighs 10 x 2  Clams with green TB 2x15 on R & L  Bridges x 10  Bridges with green TB 15 x 2- (hip abd, gluteal set, lift)  Bridge Iso with Hip ABD  SL Bridge   Palof Press  TB Back Ext  TB Trunk Rotation  (+) 5 min reviewed imaging impression with patient   Frog Pump w/ BOSU x12x3"   Banded Pull Through x12 RTB  BOSU Hip Thrust 3x10 15#  Low Ab 1  2x15" (Supine Hip 90/90 Hip Flexion Isometric)  Low Ab 2 2x15" (Supine Hip 90/90 Hip ADD Isometric)  Low Ab 3 x10x3" (Supine Hip 90/90 Hip Flexion Isometric with Contralateral Deadbug)  TB Back Extension x20 STB  Shuttle leg press 3 bands 3 x 10  Sit to stand x 15 with eccentric control and 15#  Deadlift with 15# and x 15 rep.  Pallof press with step outs x10 in semisquat position.   Open book open hips and then closed hip and then in semisquat golf swing plane position x 10 bilaterally.  Pallof press x 10 in each position, standing extended, semisquat and lunge position with each leg. With red theratube hand position in downward motion to mimic stance positioning for golf.     [place]inton test stretch with SKTC and abduction on contralateral leg to support lumbar alignment  3x 5 golf swings with cues at 66-75% speed and range     Dead lifts with dowel x10  Squats with dowel on shoulders x10  Hip Matrix   ABD 2x15 ea 25#  Ext 2x15 ea 25#        PPT 20x5"  Supine pulldowns with blue thera-tubing vs 6# weight isometric overhead with PPT 20x5"  Bridges with RTB around knees 2x10  LTR 10x5" with contralateral upper extremity flexion overhead  Table top position with tap downs 2x10  Side lying clamshells in modified side plank with RTB 2x10  Supine clams in up bridge position with clams 2 x10 bilateral vs unilateral reciprocal  Sit to stand with hip hinging 2x10 with 5# KB  Shuttle double leg press 2 black bands, 1 red band 3x10  Pushing sled with no " weight with long strides for hip extension 1 lap  Lateral walking with YTB 2x20' (with SBA for safety due to pt reporting frequent fall unrelated to current condition)        Manual therapy x 00 min :  STM glut medius, psoas major origin, iliacus, adductor christopher and brevis + HS origin  Long axis traction supine and in sidelying with counter rotation and hip extension  Short axis traction in supine.   PA L 2 grade II  UPA T8-L3 grade II        Patient Education and Home Exercises      Home Exercises Provided and Patient Education Provided      Education provided:   - HEP  - Core activation with transitional movements     Written Home Exercises Provided: yes. Exercises were reviewed and Ranjana was able to demonstrate them prior to the end of the session.  Ranjana demonstrated good  understanding of the education provided. See EMR under Patient Instructions for exercises provided during therapy sessions     ASSESSMENT      Ranjana displays significant improvement with skilled PT, she no longer reports sxs into BLE and no longer has localized pain to the low back. She displays significantly improved LE strength and agrees with PT that she is ready for DC from skilled services. Patient was provided updated HEP and had no further questions related to current case. Patient was educated on sxs that would warrant new case regarding LBP.     Ranjana Is progressing well towards her goals.   Pt prognosis is Good.      Pt will continue to benefit from skilled outpatient physical therapy to address the deficits listed in the problem list box on initial evaluation, provide pt/family education and to maximize pt's level of independence in the home and community environment.      Pt's spiritual, cultural and educational needs considered and pt agreeable to plan of care and goals.     Anticipated Barriers for therapy: pain limiting      Goals:  Short Term Goals: 4 weeks   Patient will show 25% improvement in lumbar range of  motion. Progressing MET  2. Patient will show 1/2 grade MMT improvement in hip strength MET  3. Patient will show increased step and stride length bilaterally throughout gait cycle. MET  4. Patient will comply with home exercise program at all times.  MET 12/5/22     Long Term Goals: 8 weeks   Patient will be able to climb and descend 2 flights of stairs without pain increasing.  MET  2. Patent will be able to stand and walk for > 60 minutes without pain increasing. MET  3. Patient will be able to return to 60 minutes of Progressive strengthening exercise without pain increasing. MET  4. 50% FOTO score improvement        PLAN      Patient DC from skilled PT at this time.     Blair Abbasi, PT

## 2023-01-29 ENCOUNTER — OFFICE VISIT (OUTPATIENT)
Dept: URGENT CARE | Facility: CLINIC | Age: 63
End: 2023-01-29
Payer: COMMERCIAL

## 2023-01-29 VITALS
TEMPERATURE: 98 F | WEIGHT: 111 LBS | BODY MASS INDEX: 19.67 KG/M2 | OXYGEN SATURATION: 97 % | HEIGHT: 63 IN | DIASTOLIC BLOOD PRESSURE: 80 MMHG | SYSTOLIC BLOOD PRESSURE: 124 MMHG | RESPIRATION RATE: 18 BRPM | HEART RATE: 80 BPM

## 2023-01-29 DIAGNOSIS — R05.9 COUGH, UNSPECIFIED TYPE: ICD-10-CM

## 2023-01-29 DIAGNOSIS — U07.1 COVID: Primary | ICD-10-CM

## 2023-01-29 LAB
CTP QC/QA: YES
SARS-COV-2 AG RESP QL IA.RAPID: POSITIVE

## 2023-01-29 PROCEDURE — 3079F PR MOST RECENT DIASTOLIC BLOOD PRESSURE 80-89 MM HG: ICD-10-PCS | Mod: CPTII,S$GLB,, | Performed by: NURSE PRACTITIONER

## 2023-01-29 PROCEDURE — 3074F SYST BP LT 130 MM HG: CPT | Mod: CPTII,S$GLB,, | Performed by: NURSE PRACTITIONER

## 2023-01-29 PROCEDURE — 3008F PR BODY MASS INDEX (BMI) DOCUMENTED: ICD-10-PCS | Mod: CPTII,S$GLB,, | Performed by: NURSE PRACTITIONER

## 2023-01-29 PROCEDURE — 3079F DIAST BP 80-89 MM HG: CPT | Mod: CPTII,S$GLB,, | Performed by: NURSE PRACTITIONER

## 2023-01-29 PROCEDURE — 1160F RVW MEDS BY RX/DR IN RCRD: CPT | Mod: CPTII,S$GLB,, | Performed by: NURSE PRACTITIONER

## 2023-01-29 PROCEDURE — 1160F PR REVIEW ALL MEDS BY PRESCRIBER/CLIN PHARMACIST DOCUMENTED: ICD-10-PCS | Mod: CPTII,S$GLB,, | Performed by: NURSE PRACTITIONER

## 2023-01-29 PROCEDURE — 87811 SARS CORONAVIRUS 2 ANTIGEN POCT, MANUAL READ: ICD-10-PCS | Mod: QW,S$GLB,, | Performed by: NURSE PRACTITIONER

## 2023-01-29 PROCEDURE — 3074F PR MOST RECENT SYSTOLIC BLOOD PRESSURE < 130 MM HG: ICD-10-PCS | Mod: CPTII,S$GLB,, | Performed by: NURSE PRACTITIONER

## 2023-01-29 PROCEDURE — 1159F PR MEDICATION LIST DOCUMENTED IN MEDICAL RECORD: ICD-10-PCS | Mod: CPTII,S$GLB,, | Performed by: NURSE PRACTITIONER

## 2023-01-29 PROCEDURE — 99213 OFFICE O/P EST LOW 20 MIN: CPT | Mod: S$GLB,,, | Performed by: NURSE PRACTITIONER

## 2023-01-29 PROCEDURE — 87811 SARS-COV-2 COVID19 W/OPTIC: CPT | Mod: QW,S$GLB,, | Performed by: NURSE PRACTITIONER

## 2023-01-29 PROCEDURE — 1159F MED LIST DOCD IN RCRD: CPT | Mod: CPTII,S$GLB,, | Performed by: NURSE PRACTITIONER

## 2023-01-29 PROCEDURE — 99213 PR OFFICE/OUTPT VISIT, EST, LEVL III, 20-29 MIN: ICD-10-PCS | Mod: S$GLB,,, | Performed by: NURSE PRACTITIONER

## 2023-01-29 PROCEDURE — 3008F BODY MASS INDEX DOCD: CPT | Mod: CPTII,S$GLB,, | Performed by: NURSE PRACTITIONER

## 2023-01-29 NOTE — PROGRESS NOTES
"Subjective:       Patient ID: Ranjana Dominguez is a 62 y.o. female.    Vitals:  height is 5' 3" (1.6 m) and weight is 50.3 kg (111 lb). Her temperature is 98.1 °F (36.7 °C). Her blood pressure is 124/80 and her pulse is 80. Her respiration is 18 and oxygen saturation is 97%.     Chief Complaint: Cough    Pt complains x2 days of sore throat, sinus pressure, cough, nasal congestion, rash. Took a covid test at home that was positive. Took naproxen with mild relief.      Cough  This is a new problem. The current episode started yesterday. The problem has been unchanged. The problem occurs constantly. The cough is Non-productive. Associated symptoms include headaches, nasal congestion, postnasal drip, a rash and a sore throat. Pertinent negatives include no chest pain, chills, ear congestion, ear pain, fever, heartburn, hemoptysis, myalgias, rhinorrhea, shortness of breath, sweats, weight loss or wheezing.     Constitution: Negative for chills and fever.   HENT:  Positive for postnasal drip and sore throat. Negative for ear pain.    Cardiovascular:  Negative for chest pain.   Respiratory:  Positive for cough. Negative for bloody sputum, shortness of breath and wheezing.    Gastrointestinal:  Negative for heartburn.   Musculoskeletal:  Negative for muscle ache.   Skin:  Positive for rash.   Neurological:  Positive for headaches.     Objective:      Physical Exam   Constitutional: She is oriented to person, place, and time. She appears well-developed. She is cooperative.  Non-toxic appearance. She does not appear ill. No distress.   HENT:   Head: Normocephalic and atraumatic.   Ears:   Right Ear: Hearing, tympanic membrane, external ear and ear canal normal.   Left Ear: Hearing, tympanic membrane, external ear and ear canal normal.   Nose: Rhinorrhea and congestion present. No mucosal edema or nasal deformity. No epistaxis. Right sinus exhibits no maxillary sinus tenderness and no frontal sinus tenderness. " Left sinus exhibits no maxillary sinus tenderness and no frontal sinus tenderness.   Mouth/Throat: Uvula is midline, oropharynx is clear and moist and mucous membranes are normal. No trismus in the jaw. Normal dentition. No uvula swelling. No oropharyngeal exudate, posterior oropharyngeal edema or posterior oropharyngeal erythema.   Eyes: Conjunctivae and lids are normal. No scleral icterus.   Neck: Trachea normal and phonation normal. Neck supple. No edema present. No erythema present. No neck rigidity present.   Cardiovascular: Normal rate and normal pulses.   Pulmonary/Chest: Effort normal. No respiratory distress. She has no decreased breath sounds. She has no rhonchi.   Abdominal: Normal appearance.   Musculoskeletal: Normal range of motion.         General: No deformity. Normal range of motion.   Neurological: She is alert and oriented to person, place, and time. She exhibits normal muscle tone. Coordination normal.   Skin: Skin is warm, dry, intact, not diaphoretic and not pale.   Psychiatric: Her speech is normal and behavior is normal. Judgment and thought content normal.   Nursing note and vitals reviewed.      Assessment:       1. COVID    2. Cough, unspecified type          Plan:         COVID    Cough, unspecified type  -     SARS Coronavirus 2 Antigen, POCT Manual Read      Patient Instructions    You have tested positive for COVID-19 today.       ISOLATION  If you tested positive and do not have symptoms, you must isolate for 5 days starting on the day of the positive test. I     If you tested positive and have symptoms, you must isolate for 5 days starting on the day of the first symptoms,  not the day of the positive test.     This is the most important part, both the CDC and the [facility] emphasize that you do not test out of isolation.     After 5 days, if your symptoms have improved and you have not had fever on day 5, you can return to the community on day 6- NO TESTING REQUIRED!      In fact, we do  not retest if you were positive in the last 90 days.     After your 5 days of isolation are completed, the CDC recommends strict mask use for the first 5 days that you come out of isolation.      CDC Testing and Quarantine Guidelines for patients with exposure to a known-positive COVID-19 person:  ·     A 'close exposure' is defined as anyone who has had an exposure (masked or unmasked) to a known COVID -19 positive person          within 6 feet of someone          for a cumulative total of 15 minutes or more over a 24-hour period.  ·     vaccinated Have been boosted or completed the primary series of Pfizer or Moderna vaccine within the last 6 months or completed the primary series of J&J vaccine within the last 2 months and/or had a positive test within 90 days          do NOT need to quarantine after contact with someone who had COVID-19 unless they have symptoms.          fully vaccinated people who have not had a positive test within 90 days, should get tested 3-5 days after their exposure, even if they don't have symptoms and wear a mask indoors in public for 10 days following exposure or until their test result is negative on day 5.          If you develop symptoms test and quarantine.     ·     Unvaccinated, or are more than six months out from their second mRNA dose (or more than 2 months after the J&J vaccine) and not yet boosted,  and/or NOT had a positive test within 90 days and meet 'close exposure'  you are required by CDC guidelines to quarantine for at least 5 days from time of exposure followed by 5 days of strict masking. It is recommended, but not required to test after 5 days, unless you develop symptoms, in which case you should test at that time.  If you do decide to test at 5 days and are asymptomatic, the risk is that if you test without symptoms on Day 5 for example) and you are positive, your 5 day isolation begins on that day, and you turned your 5 day quarantine into 10 days.          If  your exposure does not meet the above definition, you can return to your normal daily activities to include social distancing, wearing a mask and frequent handwashing.  Alternatively, if a 5-day quarantine is not feasible, it is imperative that an exposed person wear a well-fitting mask at all times when around others for 10 days after exposure.

## 2023-01-29 NOTE — PATIENT INSTRUCTIONS
You have tested positive for COVID-19 today.       ISOLATION  If you tested positive and do not have symptoms, you must isolate for 5 days starting on the day of the positive test. I     If you tested positive and have symptoms, you must isolate for 5 days starting on the day of the first symptoms,  not the day of the positive test.     This is the most important part, both the CDC and the [facility] emphasize that you do not test out of isolation.     After 5 days, if your symptoms have improved and you have not had fever on day 5, you can return to the community on day 6- NO TESTING REQUIRED!      In fact, we do not retest if you were positive in the last 90 days.     After your 5 days of isolation are completed, the CDC recommends strict mask use for the first 5 days that you come out of isolation.      CDC Testing and Quarantine Guidelines for patients with exposure to a known-positive COVID-19 person:  ·     A 'close exposure' is defined as anyone who has had an exposure (masked or unmasked) to a known COVID -19 positive person          within 6 feet of someone          for a cumulative total of 15 minutes or more over a 24-hour period.  ·     vaccinated Have been boosted or completed the primary series of Pfizer or Moderna vaccine within the last 6 months or completed the primary series of J&J vaccine within the last 2 months and/or had a positive test within 90 days          do NOT need to quarantine after contact with someone who had COVID-19 unless they have symptoms.          fully vaccinated people who have not had a positive test within 90 days, should get tested 3-5 days after their exposure, even if they don't have symptoms and wear a mask indoors in public for 10 days following exposure or until their test result is negative on day 5.          If you develop symptoms test and quarantine.     ·     Unvaccinated, or are more than six months out from their second mRNA dose (or more than 2 months after the J&J  vaccine) and not yet boosted,  and/or NOT had a positive test within 90 days and meet 'close exposure'  you are required by CDC guidelines to quarantine for at least 5 days from time of exposure followed by 5 days of strict masking. It is recommended, but not required to test after 5 days, unless you develop symptoms, in which case you should test at that time.  If you do decide to test at 5 days and are asymptomatic, the risk is that if you test without symptoms on Day 5 for example) and you are positive, your 5 day isolation begins on that day, and you turned your 5 day quarantine into 10 days.          If your exposure does not meet the above definition, you can return to your normal daily activities to include social distancing, wearing a mask and frequent handwashing.  Alternatively, if a 5-day quarantine is not feasible, it is imperative that an exposed person wear a well-fitting mask at all times when around others for 10 days after exposure.

## 2023-02-06 ENCOUNTER — PATIENT MESSAGE (OUTPATIENT)
Dept: ORTHOPEDICS | Facility: CLINIC | Age: 63
End: 2023-02-06
Payer: COMMERCIAL

## 2023-02-09 ENCOUNTER — OFFICE VISIT (OUTPATIENT)
Dept: OBSTETRICS AND GYNECOLOGY | Facility: CLINIC | Age: 63
End: 2023-02-09
Payer: COMMERCIAL

## 2023-02-09 VITALS
SYSTOLIC BLOOD PRESSURE: 120 MMHG | BODY MASS INDEX: 20.58 KG/M2 | DIASTOLIC BLOOD PRESSURE: 76 MMHG | WEIGHT: 116.19 LBS

## 2023-02-09 DIAGNOSIS — Z01.419 WELL WOMAN EXAM WITH ROUTINE GYNECOLOGICAL EXAM: Primary | ICD-10-CM

## 2023-02-09 PROCEDURE — 3074F PR MOST RECENT SYSTOLIC BLOOD PRESSURE < 130 MM HG: ICD-10-PCS | Mod: CPTII,S$GLB,, | Performed by: OBSTETRICS & GYNECOLOGY

## 2023-02-09 PROCEDURE — 1159F PR MEDICATION LIST DOCUMENTED IN MEDICAL RECORD: ICD-10-PCS | Mod: CPTII,S$GLB,, | Performed by: OBSTETRICS & GYNECOLOGY

## 2023-02-09 PROCEDURE — 3078F DIAST BP <80 MM HG: CPT | Mod: CPTII,S$GLB,, | Performed by: OBSTETRICS & GYNECOLOGY

## 2023-02-09 PROCEDURE — 3008F PR BODY MASS INDEX (BMI) DOCUMENTED: ICD-10-PCS | Mod: CPTII,S$GLB,, | Performed by: OBSTETRICS & GYNECOLOGY

## 2023-02-09 PROCEDURE — 99396 PREV VISIT EST AGE 40-64: CPT | Mod: S$GLB,,, | Performed by: OBSTETRICS & GYNECOLOGY

## 2023-02-09 PROCEDURE — 3008F BODY MASS INDEX DOCD: CPT | Mod: CPTII,S$GLB,, | Performed by: OBSTETRICS & GYNECOLOGY

## 2023-02-09 PROCEDURE — 1159F MED LIST DOCD IN RCRD: CPT | Mod: CPTII,S$GLB,, | Performed by: OBSTETRICS & GYNECOLOGY

## 2023-02-09 PROCEDURE — 99999 PR PBB SHADOW E&M-EST. PATIENT-LVL III: ICD-10-PCS | Mod: PBBFAC,,, | Performed by: OBSTETRICS & GYNECOLOGY

## 2023-02-09 PROCEDURE — 99999 PR PBB SHADOW E&M-EST. PATIENT-LVL III: CPT | Mod: PBBFAC,,, | Performed by: OBSTETRICS & GYNECOLOGY

## 2023-02-09 PROCEDURE — 99396 PR PREVENTIVE VISIT,EST,40-64: ICD-10-PCS | Mod: S$GLB,,, | Performed by: OBSTETRICS & GYNECOLOGY

## 2023-02-09 PROCEDURE — 3074F SYST BP LT 130 MM HG: CPT | Mod: CPTII,S$GLB,, | Performed by: OBSTETRICS & GYNECOLOGY

## 2023-02-09 PROCEDURE — 3078F PR MOST RECENT DIASTOLIC BLOOD PRESSURE < 80 MM HG: ICD-10-PCS | Mod: CPTII,S$GLB,, | Performed by: OBSTETRICS & GYNECOLOGY

## 2023-02-09 NOTE — PROGRESS NOTES
History & Physical  Gynecology      SUBJECTIVE:     Chief Complaint: Well Woman       History of Present Illness:  Annual Exam-Postmenopausal  Patient presents for annual exam. The patient has no complaints today. Patient denies post-menopausal vaginal bleeding. The patient is somewhat sexually active. The patient is not taking hormone replacement therapy.  The patient participates in regular exercise: yes.  She does not smoke.     GYN screening history:  paphpv  Mammogram history: 2023  Colonoscopy history: 2021- repeat 3 years  Dexa history: osteopenia,     FH:   Breast cancer: neg  Colon cancer: father  Ovarian cancer: neg  Bladder cancer: mother    Review of patient's allergies indicates:   Allergen Reactions    Decongestant tablet     Hydrocodone Itching       Past Medical History:   Diagnosis Date    Allergy     Anxiety     Colon polyp, hyperplastic: see colonoscopy 2018    Depression     Family history of colon cancer: father in his 60s 2016    Glucose intolerance (impaired glucose tolerance) 2012    Headache(784.0)     History of bariatric surgery 2018    Hypothyroidism     Lumbar radiculopathy 10/25/2022    Memory loss     Menopause syndrome     Mitral valve disorders(424.0) 2012    Obesity     Other and unspecified hyperlipidemia     Sleep apnea: per sleep study 2015; CPAP at 11 cm recommended 2015    Special screening for malignant neoplasms, colon 2015    Thyroid disease      Past Surgical History:   Procedure Laterality Date    APPENDECTOMY  2015    BREAST CYST ASPIRATION       SECTION, CLASSIC      COLONOSCOPY N/A 2018    Procedure: COLONOSCOPY;  Surgeon: Hansel Shell MD;  Location: [address]);  Service: Endoscopy;  Laterality: N/A;    COLONOSCOPY N/A 2021    Procedure: COLONOSCOPY;  Surgeon: Martínez Amin MD;  Location: [address]);  Service: Endoscopy;  Laterality: N/A;  8/10 - LVM about  cancelling procedure- sm  pt completed COVID vaccine- see Immunization record in chart-rb    FRACTURE SURGERY      HERNIA REPAIR      umbilical hernia    NOSE SURGERY      SLEEVE GASTROPLASTY      TRANSFORAMINAL EPIDURAL INJECTION OF STEROID Bilateral 2022    Procedure: TFESI (B/L) L4/5;  Surgeon: Jorge Shipman DO;  Location: [facility] OR;  Service: Pain Management;  Laterality: Bilateral;     OB History          2    Para   2    Term   1       1    AB   0    Living   2         SAB   0    IAB        Ectopic        Multiple        Live Births   1               Family History   Problem Relation Age of Onset    Sleep apnea Mother     Cancer Mother         bladder sarcoma, smoking    Sleep apnea Father     Depression Father         Bipolar    Cancer Father         colon    Colon cancer Father     Depression Sister         suicide    No Known Problems Brother     No Known Problems Daughter     Cancer Maternal Grandmother         lymphoma    Heart disease Paternal Grandmother     Melanoma Neg Hx     Skin cancer Neg Hx     Breast cancer Neg Hx     Ovarian cancer Neg Hx      Social History     Tobacco Use    Smoking status: Former     Packs/day: 0.50     Years: 7.00     Pack years: 3.50     Types: Cigarettes     Quit date: 1982     Years since quittin.2    Smokeless tobacco: Never   Substance Use Topics    Alcohol use: Yes     Alcohol/week: 0.0 standard drinks     Types: 2 - 4 Glasses of wine per week     Comment: weekends    Drug use: No       Current Outpatient Medications   Medication Sig    cholecalciferol, vitamin D3, (VITAMIN D3) 25 mcg (1,000 unit) capsule Take 2 capsules (2,000 Units total) by mouth once daily.    clonazePAM (KLONOPIN) 0.5 MG tablet Take 0.5 mg by mouth once daily.    cyanocobalamin (VITAMIN B-12) 1000 MCG tablet Take 1 tablet (1,000 mcg total) by mouth once daily.    fluticasone (FLONASE) 50 mcg/actuation nasal spray SHAKE LIQUID AND USE 2 SPRAYS(100 MCG) IN EACH  NOSTRIL EVERY DAY    gabapentin (NEURONTIN) 100 MG capsule TAKE 1 CAPSULE(100 MG) BY MOUTH THREE TIMES DAILY    levothyroxine (SYNTHROID) 88 MCG tablet Take 1 tablet (88 mcg total) by mouth before breakfast.    lorazepam (ATIVAN) 0.5 MG tablet     naproxen (NAPROSYN) 500 MG tablet Take 500 mg by mouth 2 (two) times daily.    sars-cov-2, covid-19, (MODERNA COVID-19) 50 mcg/0.25 ml injection (BOOSTER) Inject into the muscle.    venlafaxine (EFFEXOR) 50 MG Tab TK 1 T PO  TID     No current facility-administered medications for this visit.     Facility-Administered Medications Ordered in Other Visits   Medication    dextrose 5 % and 0.45 % NaCl infusion    dextrose 5 % and 0.45 % NaCl infusion    sodium chloride 0.9% flush 3 mL       Review of Systems:  Review of Systems   Constitutional:  Negative for appetite change, fever and unexpected weight change.   Respiratory:  Negative for shortness of breath.    Cardiovascular:  Negative for chest pain.   Gastrointestinal:  Negative for nausea and vomiting.   Genitourinary:  Negative for pelvic pain, vaginal bleeding, vaginal discharge, vaginal pain and postmenopausal bleeding.   Integumentary:  Negative for breast mass.   Breast: Negative for lump and mass     OBJECTIVE:     Physical Exam:  Physical Exam  Vitals and nursing note reviewed.   Constitutional:       Appearance: She is well-developed.   Cardiovascular:      Rate and Rhythm: Normal rate and regular rhythm.      Heart sounds: Normal heart sounds.   Pulmonary:      Effort: Pulmonary effort is normal.      Breath sounds: Normal breath sounds.   Chest:   Breasts:     Breasts are symmetrical.      Right: No inverted nipple, mass, nipple discharge, skin change or tenderness.      Left: No inverted nipple, mass, nipple discharge, skin change or tenderness.   Abdominal:      Palpations: Abdomen is soft.   Genitourinary:     General: Normal vulva.      Labia:         Right: No rash, tenderness, lesion or injury.          Left: No rash, tenderness, lesion or injury.       Urethra: No prolapse, urethral pain, urethral swelling or urethral lesion.      Vagina: Normal. No signs of injury and foreign body. No vaginal discharge, erythema, tenderness or bleeding.      Cervix: No cervical motion tenderness, discharge or friability.      Uterus: Not deviated, not enlarged, not fixed and not tender.       Adnexa:         Right: No mass, tenderness or fullness.          Left: No mass, tenderness or fullness.        Rectum: No anal fissure or external hemorrhoid.      Comments: Urethral meatus: normal size, anterior vaginal wall with no prolapse, no lesions  Bladder: no fullness, masses or tenderness  Musculoskeletal:      Cervical back: Normal range of motion.   Neurological:      Mental Status: She is alert and oriented to person, place, and time.   Psychiatric:         Behavior: Behavior normal.         Thought Content: Thought content normal.         Judgment: Judgment normal.       Chaperoned by: Malena    ASSESSMENT:       ICD-10-CM ICD-9-CM    1. Well woman exam with routine gynecological exam  Z01.419 V72.31                Plan:      Ranjana was seen today for well woman.    Diagnoses and all orders for this visit:    Well woman exam with routine gynecological exam          No orders of the defined types were placed in this encounter.      Well Woman:   - Pap smear: up to date  - Mammogram: up to date  - Colonoscopy: due next year  - Dexa: up to date  - Immunizations: not discussed  - Labs: with pcp  - Exercise encouraged    Follow up in one year for annual, or prn.    Kelly De Luna

## 2023-02-20 ENCOUNTER — TELEPHONE (OUTPATIENT)
Dept: NEUROLOGY | Facility: CLINIC | Age: 63
End: 2023-02-20
Payer: COMMERCIAL

## 2023-02-24 ENCOUNTER — PATIENT MESSAGE (OUTPATIENT)
Dept: NEUROLOGY | Facility: CLINIC | Age: 63
End: 2023-02-24
Payer: COMMERCIAL

## 2023-03-20 NOTE — PROGRESS NOTES
NEUROPSYCHOLOGY CONSULT  [facility]      Referral Information  Name: Ranjana Dominguez    : 1960  Age: 62 y.o.    Race: White    Gender: female     MRN: [id number]  Handedness: Ambidextrous. Writes L handed, but can write with both   Education: 12 years      Referring Provider:   Rosana Sotomayor PA-C  Referral Reason/Medical Necessity: Ms. Dominguez has a history of tremor, gait imbalance, cognitive changes. Neuropsychological evaluation was requested to assess current cognitive functioning, aid in differential diagnosis, and provide treatment recommendations.    Consent/Emergency Plan: The patient expressed an understanding of the purpose of the evaluation and consented to all procedures. I informed the patient of limits to confidentiality and discussed an emergency plan.   Procedures/Billing: Please see billing table at the end of this report.  Referral Diagnosis: R41.89 (ICD-10-CM) - Cognitive change  Telemedicine:   Established Patient - Audio Only Telehealth Visit  The patient location is: [place]   The chief complaint leading to consultation is: R41.89 (ICD-10-CM) - Cognitive change  Visit type: Virtual visit with audio only (telephone)  Total time spent with patient: 48 min  The reason for the audio only service rather than synchronous audio and video virtual visit was related to technical difficulties or patient preference/necessity.  Each patient to whom I provide medical services by telemedicine is:  (1) informed of the relationship between the physician and patient and the respective role of any other health care provider with respect to management of the patient; and (2) notified that they may decline to receive medical services by telemedicine and may withdraw from such care at any time. Patient verbally consented to receive this service via voice-only telephone call.      SUMMARY    Ms. Dominguez is a 62 y.o. White female with a history of tremor, gait imbalance with  falls, lumbar radiculopathy, anxiety, HLD, acquired hypothyroidism, history of bariatric surgery presenting for evaluation of cognitive complaints.  Patient reports word-finding difficulty and forgetfulness that have progressively worsened over the last 2 years.  She was recently seen by Neurology, workup so far favors essential tremor.  She reports being clumsy with very poor balance, has fallen several times, does admit she has a bulging disc in her lower back which can impact walking.  Does not have a parkinsonian gait on most recent neuro exam.  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  TSH, B6, B1, B12, vitamin-D, zinc are within normal limits.  She reports longstanding insomnia, and occasionally wakes screaming or jerks in her sleep.   has REM sleep behavior, so she is aware of what this looks like.  Also has a history of depression and anxiety, currently managed on venlafaxine and clonazepam, the latter only at night for sleep.  Reports she has had multiple stressors over the last several years, including being laid off abruptly from her job of 30 years, and her 's recent diagnosis of Parkinson's disease. Denies major issues with pain or fatigue.  She remains functionally independent, and is fully oriented today.    Ms. Dominguez is scheduled for testing.  Differentials include normal aging with anxiety exacerbating word-finding issues, versus vascular etiology given age advanced ischemic changes on imaging, versus neurodegenerative process.  Low suspicion for Alzheimer's disease given her age and that she has had symptoms for 2 years and remains functionally independent, suggesting against aggressive early onset variant.  We will also try to rule out emerging synucleinopathy.    IMPRESSIONS      1. Signs and symptoms involving cognition        2. Primary insomnia        3. Mild episode of recurrent major depressive  disorder        4. Anxiety        5. Abnormal brain MRI        6. Family history of Alzheimer's disease            PLAN     Ms. Dominguez is scheduled for testing on 3/31. Full report to follow.       Thank you for allowing me to participate in Ms. Dominguez's care.  If you have any questions, please contact me.    Camille Lundy PsyD  Clinical Neuropsychologist  Department of Neurology  [facility]        SUBJECTIVE:     HISTORY OF PRESENT ILLNESS   Ms. Dominguez is a 62 y.o. White female with a history of tremor, for lumbar radiculopathy, anxiety, HLD, acquired hypothyroidism, history of bariatric surgery  referred for neuropsychological evaluation. She was seen recently by neurology for tremor and gait imbalance, which began around 2018.     Neuro exam found mild postural tremor during finger to nose, as well as high frequency rest tremor bilaterally, most prominent in R thumb. Tandem walk was difficult.     Family history of dementia in her maternal great grandmother. Pt's maternal grandmother had series of mini strokes, which caused memory loss.     Cognitive Symptoms:  Attention: Losing her train of thought, forgets why she walked into a room  Mental Speed: Endorsed slowed thinking   Memory:Frequently misplacing keys and phone.   Language: Increased word finding, circumlocution  Visuospatial/Perceptual: No changes to sense of direction. Not getting lost.   Executive Functioning: Planning, organizing, multitasking. Got laid off 2019, semi-retired now so she admits she hasn't been doing as much. Did a few odd jobs after, which helped build her confidence. No issues keeping up at any of her jobs   Orientation: Fully oriented     Onset/Course: Ms. Cades symptoms were gradual in onset around 2019 and are worsening. Symptoms became more obvious after she got laid off from work. Symptoms are worse with stress, poor sleep, pain. No waxing/waning of cognitive status.  Medication for Cognition: None    Sleep:  "decreased - "I really don't sleep well. I suffer with insomnia."   Pattern: has difficulty falling asleep and has restless sleep. Variable hours per night. Last night she got 4 hrs. Tries to get 8, but sometimes her sleep phase gets really delayed and it's hard to change - this happened even when she was working and getting up early. If she is stressed, will wake more frequently throughout the night. Falls asleep with the TV on. Gets in bed at the same time every night but cannot fall asleep for hours. In the past has used sleep aid, which helped with initiation but not maintenance. Currently takes clonazepam at night.   KACY: Used to have KACY, used CPAP. Had gastric sleeve in 2016, KACY resolved. Not snoring anymore.   Acting out dreams: When she is very stressed, has woken up screaming.  has Parkinson's, acts out his dreams. She does jump around in her sleep. No recent sleep study.   Daytime Naps: Denied  Appetite: normal   Energy: normal     Mood:  Treatment History: Takes Effexor, clonazepam at night for sleep. Follows with a therapist.   Self-Described Mood: "It's been ok."   Depressed Mood: Endorsed - on and off for the last 37 years, since she had her daughter. Feels these are pretty well controlled recently.   Anxiety: Endorsed - always been a worrier.    Panic Attacks: Denied    Current Stressors: daughter and  are having marital issues. 's Parkinson's diagnosis in 2022  SI/HI: Denied. Did have a sister who committed suicide in 2014    Neuropsychiatric:  Personality Change: Denied    Delusional/Paranoid Thinking: Denied  Hallucinations: Denied  Impulsive/Compulsive Behaviors: Denied  Disinhibition: Denied  Apathy: Denied  Irritability/Agitation: Denied    Physical Sx:  Motor:  Mild postural tremor, worse on the left. Also high frequency rest tremor bilaterally noted on exam, most prominent in R thumb. Pt has not noticed this. No trouble swallowing.   Gait:  Pt ambulates independently.  " and unsteady. 3 to 4 falls in the last 6 months. Does have a bulging disc in her back.  Sensory: No change to taste, smell, or vision. Sometimes hard for her to hear with a lot of background noise.   Pain: Ms. Dominguez described typical pain at a 0 on a scale of 1-10, with 10 being worst. Does have some pain if she overexerts herself with back injury    Current Functioning (I/ADLs):  ADLs: Independent   IADLs:  Finances: Independent   Medication Mgmt:Independent   Driving: Independent   Household Mgmt: Independent   Vocational:  Retired  Safety Concerns: None      RELEVANT HISTORY:  Prior Testing:  None    Neurologic History  Seizures: Denied  Stroke: Denied  TBI: Did get a concussion once falling off a stepladder. No cognitive sequelae   Movement Disorder: Endorsed - does have tremor, favoring ET   Falls: Endorsed  Urinary Incontinence: Endorsed - increased urgency recently. Harder for her to hold it, will have some leakage.   Chronic UTI's:  Denied  Dysautonomia: dizziness upon standing (less now that she is more hydrated) and urinary symptoms, occasional constipation (normal colonoscopy)     Substance Use History:  Social History     Tobacco Use    Smoking status: Former     Packs/day: 0.50     Years: 7.00     Pack years: 3.50     Types: Cigarettes     Quit date: 1982     Years since quittin.3    Smokeless tobacco: Never   Substance and Sexual Activity    Alcohol use: Yes     Alcohol/week: 0.0 standard drinks     Types: 2 - 4 Glasses of wine per week     Comment: weekends    Drug use: No    Sexual activity: Yes     Partners: Male     Caffeine               1 cup coffee qam     Family History:  Family History   Problem Relation Age of Onset    Sleep apnea Mother     Cancer Mother         bladder sarcoma, smoking    Sleep apnea Father     Depression Father         Bipolar    Cancer Father         colon    Colon cancer Father     Depression Sister         suicide    No Known Problems Brother     No Known  "Problems Daughter     Cancer Maternal Grandmother         lymphoma    Heart disease Paternal Grandmother     Melanoma Neg Hx     Skin cancer Neg Hx     Breast cancer Neg Hx     Ovarian cancer Neg Hx      Family Neurologic History: dementia in maternal grandmother (vascular?) and great grandmother   Family Psychiatric History: sister  by suicide     Developmental/Academic Hx:  Developmental: Born and raised in [place].  Product of a normal pregnancy and delivery. No developmental delays. English is their only language.   Academic:  Learning Difficulties: "I didn't apply myself, but I did fine. Average student." No history of formal learning disorder diagnosis. Never repeated a grade.  Attention Difficulties: Denied. Never diagnosed with or treated for ADHD.  Behavioral Difficulties: Denied  Educational Attainment: 12    Social/Occupational Hx:  Occupational:  Occupational Status: laid off , now retired. Laid off unexpectedly after 30 years, they told her the week before [place] on a Friday.   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   Social:  Resides: at home with    Current Relationship/Family Status:  since , second marriage for each. One daughter from prior marriage.   Primary Source of Support:  Pt endorses adequate social support.   Daily Activities: Has had a little trouble figuring out how to spend her time since prison. Looking for places to volunteer, has worked a few odd jobs. Loves music, would like to start playing again. Daughter is a music therapist. Also enjoys animals       Objective:     Neurodiagnostics:  Results for orders placed or performed during the hospital encounter of 12   MRI Brain W WO Contrast    Narrative    Procedure: MRI the brain with andwithout contrast.    Technique: Sagittal and axial T1, axial T2, axial FLAIR, axial gradient, axial diffusion, and " axial, sagittal, and coronal postcontrast T1 images of the whole brain.   ml of Gadavist injected intravenously.         Comparison: None    Findings: The brain parenchyma is normal in contour. There are no abnormal areas of parenchymal  enhancement. there are several punctate foci of T2/flair signal hyperintensity in the supratentorial white matter these are nonspecific and of uncertain   clinical significance differential considerations include advanced chronic microvascular ischemic change for age. There are no areas are restricted diffusion to suggest acute infarction. The ventricles are normal in size and configuration without   evidence for hydrocephalus. There are no abnormal areas of the gradient susceptibility to suggest parenchymal hemorrhage.  the major intracranial T2  flow-voids are present.  Thornwaldt cyst midline nasopharynx    Impression     Several punctate foci of T2/flair signal hyperintensity supratentorial white matter which are nonspecific differential considerations to include chronic microvessel ischemic change advanced for age.     No evidence for acute infarction or enhancing lesion.    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      Electronically signed by: YUKI CURRY DO  Date:     12/03/12  Time:    09:18    Results for orders placed or performed during the hospital encounter of 10/26/12   CT Head Without Contrast    Narrative    CT head without contrast    Comparison: None.    Technique: 5-mm axial images of the head were performed without the administration of contrast.  Sagittal and coronal reformatted images were also obtained.    Findings:  There is mild cerebral white loss appropriate for age.  Mild patchy decreased attenuation in the periventricular white matter suggestive of chronic microvascular ischemic change.  No evidence of hydrocephalus.  No evidence of acute intracranial   hemorrhage or major vascular distribution infarction.  There are no extra-axial fluid collections.   Visualized portions of the paranasal sinuses and mastoid air cells are clear.  No evidence of fracture or other bony animality.    Impression     No acute intracranial abnormality detected.    Mild patchy decreased attenuation in the periventricular white matter consistent with chronic microvascular ischemic change.   ______________________________________     Electronically signed by resident: Devon Syed  Date:     10/26/12  Time:    16:18      As the supervising and teaching physician, I personally reviewed the images and resident's interpretation and I agree with the findings.      ______________________________________  Electronically signed by: URIEL GARCES MD  Date:     10/26/12  Time:    17:21        Pertinent Labs:  Lab Results   Component Value Date    [id number] 1223 (H) 12/01/2022     No results found for: VITAMINB1  No results found for: RPR  Lab Results   Component Value Date    FOLATE 11.4 05/13/2016     Lab Results   Component Value Date    TSH 2.425 12/01/2022     Lab Results   Component Value Date    .0 (H) 05/13/2016    CALCIUM 9.7 08/15/2019      Lab Results   Component Value Date    HGBA1C 5.0 08/15/2019     Lab Results   Component Value Date    ZKI19PPVU Negative 05/13/2015           Current Outpatient Medications:     cholecalciferol, vitamin D3, (VITAMIN D3) 25 mcg (1,000 unit) capsule, Take 2 capsules (2,000 Units total) by mouth once daily., Disp: 60 capsule, Rfl: 12    clonazePAM (KLONOPIN) 0.5 MG tablet, Take 0.5 mg by mouth once daily., Disp: , Rfl:     cyanocobalamin (VITAMIN B-12) 1000 MCG tablet, Take 1 tablet (1,000 mcg total) by mouth once daily., Disp: 30 tablet, Rfl: 12    fluticasone (FLONASE) 50 mcg/actuation nasal spray, SHAKE LIQUID AND USE 2 SPRAYS(100 MCG) IN EACH NOSTRIL EVERY DAY, Disp: 48 mL, Rfl: 0    gabapentin (NEURONTIN) 100 MG capsule, TAKE 1 CAPSULE(100 MG) BY MOUTH THREE TIMES DAILY, Disp: 90 capsule, Rfl: 0    levothyroxine (SYNTHROID) 88 MCG tablet,  Take 1 tablet (88 mcg total) by mouth before breakfast., Disp: 90 tablet, Rfl: 3    lorazepam (ATIVAN) 0.5 MG tablet, , Disp: , Rfl: 2    naproxen (NAPROSYN) 500 MG tablet, Take 500 mg by mouth 2 (two) times daily., Disp: , Rfl:     sars-cov-2, covid-19, (MODERNA COVID-19) 50 mcg/0.25 ml injection (BOOSTER), Inject into the muscle., Disp: 0.25 mL, Rfl: 0    venlafaxine (EFFEXOR) 50 MG Tab, TK 1 T PO  TID, Disp: , Rfl: 2  No current facility-administered medications for this visit.    Facility-Administered Medications Ordered in Other Visits:     dextrose 5 % and 0.45 % NaCl infusion, , Intravenous, Continuous, Hansel Shell MD, New Bag at 08/13/18 0843    dextrose 5 % and 0.45 % NaCl infusion, , Intravenous, Continuous, Hansel Shell MD, Last Rate: 20 mL/hr at 08/13/18 0755, New Bag at 08/13/18 0755    sodium chloride 0.9% flush 3 mL, 3 mL, Intravenous, PRN, Hansel Shell MD    Medical history  Patient Active Problem List   Diagnosis    Anxiety    Glucose intolerance (impaired glucose tolerance)    Mixed hyperlipidemia    Acquired hypothyroidism    Family history of colon cancer: father in his 60s    History of bariatric surgery: sleeve 2017    Colon polyp, hyperplastic 2018; adenoma 2021 repeat 3 years    Osteopenia of multiple sites: see DEXA 7/21    Diverticulosis: see colonoscopy 2021    Lumbar radiculopathy    Weakness    Decreased mobility and endurance     Past Medical History:   Diagnosis Date    Allergy     Anxiety     Colon polyp, hyperplastic: see colonoscopy 8/18 8/14/2018    Depression     Family history of colon cancer: father in his 60s 5/13/2016    Glucose intolerance (impaired glucose tolerance) 7/9/2012    Headache(784.0)     History of bariatric surgery 7/2/2018    Hypothyroidism     Lumbar radiculopathy 10/25/2022    Memory loss     Menopause syndrome     Mitral valve disorders(424.0) 5/30/2012    Obesity     Other and unspecified hyperlipidemia     Sleep apnea: per sleep study  2015; CPAP at 11 cm recommended 2015    Special screening for malignant neoplasms, colon 2015    Thyroid disease      Past Surgical History:   Procedure Laterality Date    APPENDECTOMY  2015    BREAST CYST ASPIRATION       SECTION, CLASSIC      COLONOSCOPY N/A 2018    Procedure: COLONOSCOPY;  Surgeon: Hansel Shell MD;  Location: [facility] ENDO (4TH FLR);  Service: Endoscopy;  Laterality: N/A;    COLONOSCOPY N/A 2021    Procedure: COLONOSCOPY;  Surgeon: Martínez Amin MD;  Location: [facility] ENDO (4TH FLR);  Service: Endoscopy;  Laterality: N/A;  8/10 - LVM about cancelling procedure- sm  pt completed COVID vaccine- see Immunization record in chart-rb    FRACTURE SURGERY      HERNIA REPAIR      umbilical hernia    NOSE SURGERY      SLEEVE GASTROPLASTY      TRANSFORAMINAL EPIDURAL INJECTION OF STEROID Bilateral 2022    Procedure: TFESI (B/L) L4/5;  Surgeon: Jorge Shipman DO;  Location: [facility];  Service: Pain Management;  Laterality: Bilateral;         MENTAL STATUS AND BEHAVIORAL OBSERVATIONS:  Appearance:  Not observed (telephone visit)  Behavior:   alert, calm, cooperative, and rapport easily established  Orientation:   Fully oriented  Sensory:   Hearing and vision adequate for virtual/telephone visit  Gait:   Not observed (virtual/telephone visit)   Psychomotor:  Not observed (telephone visit)  Speech:  Fluent and spontaneous. Normal volume, rate, pitch, tone, and prosody.  Language:  Receptive and expressive language appear intact. Comprehends conversational speech., No evidence of word-finding difficulties in conversational speech.  Mood:   Euthymic, a little anxious  Affect:   mood-congruent  Thought Process: goal directed and linear  Thought Content: Denied current SI/HI. and No evidence of psychotic symptoms.  Memory:  recent and remote appear grossly intact  Attn/Concentration:  Grossly intact  Judgment/Insight: Grossly intact      BILLING  INFORMATION:  Billing/Services Summary          Psychiatric Diagnostic Interview Base Code (39360)  Total Units: 0    Face-to-face Total Time: 0 min.         Neurobehavioral Status Exam Base Code (81128)   Total Units: 1 Add-on (59405)  Total Units: 1   Face-to-face 48 min.    Record Review, Integration, Report Generation 45 min.     Total Time: 93 min.    DOS is the date of the evaluation unless specified                  This service was not originating from a related E/M service provided within the previous 7 days nor will  to an E/M service or procedure within the next 24 hours or my soonest available appointment.  Prevailing standard of care was able to be met in this audio-only visit.

## 2023-03-21 ENCOUNTER — OFFICE VISIT (OUTPATIENT)
Dept: NEUROLOGY | Facility: CLINIC | Age: 63
End: 2023-03-21
Payer: COMMERCIAL

## 2023-03-21 DIAGNOSIS — Z82.0 FAMILY HISTORY OF ALZHEIMER'S DISEASE: ICD-10-CM

## 2023-03-21 DIAGNOSIS — R41.89 SIGNS AND SYMPTOMS INVOLVING COGNITION: Primary | ICD-10-CM

## 2023-03-21 DIAGNOSIS — R90.89 ABNORMAL BRAIN MRI: ICD-10-CM

## 2023-03-21 DIAGNOSIS — F33.0 MILD EPISODE OF RECURRENT MAJOR DEPRESSIVE DISORDER: ICD-10-CM

## 2023-03-21 DIAGNOSIS — F41.9 ANXIETY: ICD-10-CM

## 2023-03-21 DIAGNOSIS — F51.01 PRIMARY INSOMNIA: ICD-10-CM

## 2023-03-21 PROCEDURE — 99499 UNLISTED E&M SERVICE: CPT | Mod: 95,,, | Performed by: PSYCHIATRY & NEUROLOGY

## 2023-03-21 PROCEDURE — 96121 NUBHVL XM PHY/QHP EA ADDL HR: CPT | Mod: FQ,95,, | Performed by: PSYCHIATRY & NEUROLOGY

## 2023-03-21 PROCEDURE — 99499 NO LOS: ICD-10-PCS | Mod: 95,,, | Performed by: PSYCHIATRY & NEUROLOGY

## 2023-03-21 PROCEDURE — 96121 PR NEUROBEHAVIORAL STAT EXAM, EA ADDTL HR: ICD-10-PCS | Mod: FQ,95,, | Performed by: PSYCHIATRY & NEUROLOGY

## 2023-03-21 PROCEDURE — 96116 PR NEUROBEHAVIORAL STATUS EXAM BY PSYCH/PHYS: ICD-10-PCS | Mod: FQ,95,, | Performed by: PSYCHIATRY & NEUROLOGY

## 2023-03-21 PROCEDURE — 96116 NUBHVL XM PHYS/QHP 1ST HR: CPT | Mod: FQ,95,, | Performed by: PSYCHIATRY & NEUROLOGY

## 2023-03-28 ENCOUNTER — PATIENT MESSAGE (OUTPATIENT)
Dept: NEUROLOGY | Facility: CLINIC | Age: 63
End: 2023-03-28
Payer: COMMERCIAL

## 2023-03-30 ENCOUNTER — OFFICE VISIT (OUTPATIENT)
Dept: NEUROLOGY | Facility: CLINIC | Age: 63
End: 2023-03-30
Payer: COMMERCIAL

## 2023-03-30 DIAGNOSIS — R41.89 COGNITIVE CHANGE: ICD-10-CM

## 2023-03-30 DIAGNOSIS — F06.70 MILD NEUROCOGNITIVE DISORDER DUE TO MULTIPLE ETIOLOGIES, WITHOUT BEHAVIORAL DISTURBANCE: Primary | ICD-10-CM

## 2023-03-30 DIAGNOSIS — F51.01 PRIMARY INSOMNIA: ICD-10-CM

## 2023-03-30 DIAGNOSIS — R90.89 ABNORMAL BRAIN MRI: ICD-10-CM

## 2023-03-30 DIAGNOSIS — F41.9 ANXIETY: ICD-10-CM

## 2023-03-30 PROCEDURE — 99499 UNLISTED E&M SERVICE: CPT | Mod: S$GLB,,, | Performed by: PSYCHIATRY & NEUROLOGY

## 2023-03-30 PROCEDURE — 96138 PR PSYCH/NEUROPSYCH TEST ADMIN/SCORING, BY TECH, 2+ TESTS, 1ST 30 MIN: ICD-10-PCS | Mod: S$GLB,,, | Performed by: PSYCHIATRY & NEUROLOGY

## 2023-03-30 PROCEDURE — 96133 NRPSYC TST EVAL PHYS/QHP EA: CPT | Mod: S$GLB,,, | Performed by: PSYCHIATRY & NEUROLOGY

## 2023-03-30 PROCEDURE — 99499 NO LOS: ICD-10-PCS | Mod: S$GLB,,, | Performed by: PSYCHIATRY & NEUROLOGY

## 2023-03-30 PROCEDURE — 96132 PR NEUROPSYCHOLOGIC TEST EVAL SVCS, 1ST HR: ICD-10-PCS | Mod: S$GLB,,, | Performed by: PSYCHIATRY & NEUROLOGY

## 2023-03-30 PROCEDURE — 96139 PSYCL/NRPSYC TST TECH EA: CPT | Mod: S$GLB,,, | Performed by: PSYCHIATRY & NEUROLOGY

## 2023-03-30 PROCEDURE — 96138 PSYCL/NRPSYC TECH 1ST: CPT | Mod: S$GLB,,, | Performed by: PSYCHIATRY & NEUROLOGY

## 2023-03-30 PROCEDURE — 96139 PR PSYCH/NEUROPSYCH TEST ADMIN/SCORING, BY TECH, 2+ TESTS, EA ADDTL 30 MIN: ICD-10-PCS | Mod: S$GLB,,, | Performed by: PSYCHIATRY & NEUROLOGY

## 2023-03-30 PROCEDURE — 96133 PR NEUROPSYCHOLOGIC TEST EVAL SVCS, EA ADDTL HR: ICD-10-PCS | Mod: S$GLB,,, | Performed by: PSYCHIATRY & NEUROLOGY

## 2023-03-30 PROCEDURE — 99999 PR PBB SHADOW E&M-EST. PATIENT-LVL I: CPT | Mod: PBBFAC,,, | Performed by: PSYCHIATRY & NEUROLOGY

## 2023-03-30 PROCEDURE — 99999 PR PBB SHADOW E&M-EST. PATIENT-LVL I: ICD-10-PCS | Mod: PBBFAC,,, | Performed by: PSYCHIATRY & NEUROLOGY

## 2023-03-30 PROCEDURE — 96132 NRPSYC TST EVAL PHYS/QHP 1ST: CPT | Mod: S$GLB,,, | Performed by: PSYCHIATRY & NEUROLOGY

## 2023-04-19 ENCOUNTER — PATIENT MESSAGE (OUTPATIENT)
Dept: ORTHOPEDICS | Facility: CLINIC | Age: 63
End: 2023-04-19
Payer: COMMERCIAL

## 2023-04-20 DIAGNOSIS — M54.16 LUMBAR RADICULOPATHY: Primary | ICD-10-CM

## 2023-04-20 NOTE — PROGRESS NOTES
Spoke with pt virtually. She had a bilateral L4-5 TFESI on 11/11/22 with 75% relief for 3 months. She continues to have low back and bilateral leg pain. Since her injection she has continued home exercises and gabapentin with worsening of pain. I provided the patient with a home exercise program. It is the [facility] spine conditioning program. Exercises include head rolls, kneeling back extension, sitting rotation stretch, modified seated side straddle, knee to chest, bird dog, plank, modified seated plank, hip bridges, abdominal bracing, and abdominal crunch. Pt completes each exercise daily for 1 hour. Pain is 10/10. MRI demonstrates multilevel degenerative changes of the lumbar spine with most pronounced changes including severe central canal stenosis and severe left neural foraminal narrowing at L4-L5. Will repeat L4-5 TFESI. Pt will fu if pain persists.

## 2023-04-24 NOTE — PROGRESS NOTES
NEUROPSYCHOLOGY CONSULT  [facility]      Referral Information  Name: Ranjana Dominguez    : 1960  Age: 62 y.o.    Race: White    Gender: female     MRN: [id number]  Handedness: Ambidextrous. Writes L handed, but can write with both   Education: 12 years      Referring Provider:   Rosana Sotomayor PA-C  Referral Reason/Medical Necessity: Ms. Dominguez has a history of tremor, gait imbalance, cognitive changes. Neuropsychological evaluation was requested to assess current cognitive functioning, aid in differential diagnosis, and provide treatment recommendations.    Consent/Emergency Plan: The patient expressed an understanding of the purpose of the evaluation and consented to all procedures. I informed the patient of limits to confidentiality and discussed an emergency plan.   Procedures/Billing: Please see billing table at the end of this report.  Referral Diagnosis: R41.89 (ICD-10-CM) - Cognitive change      SUMMARY    Ms. Dominguez is a 62 y.o. White female with a history of tremor, gait imbalance with falls, lumbar radiculopathy, anxiety, HLD, acquired hypothyroidism, history of bariatric surgery presenting for evaluation of cognitive complaints.  Patient reports word-finding difficulty and forgetfulness that have progressively worsened over the last 2 years.  She was recently seen by Neurology, workup so far favors essential tremor.  She reports being clumsy with very poor balance, has fallen several times, does note she has a bulging disc in her lower back which can impact walking.  Does not have a parkinsonian gait on most recent neuro exam.  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  TSH, B6, B1, B12, vitamin-D, zinc are within normal limits.  She reports longstanding insomnia, and occasionally wakes screaming or jerks in her sleep.   has REM sleep behavior, so she is aware of what this  "looks like.  Also has a history of depression and anxiety, currently managed on venlafaxine and clonazepam, the latter only at night for sleep.  Reports she has had multiple stressors over the last several years, including being laid off abruptly from her job of 30 years, and her 's recent diagnosis of Parkinson's disease. Denies major issues with pain or fatigue.  She remains functionally independent, and is fully oriented today.    Cognitive testing found mild, intermittent executive inefficiency in the context of average premorbid functioning. Specifically, she had difficulty with nonverbal abstraction, mental arithmetic, and mental flexibility/set shifting. She had some inefficient encoding on the first trial of a memory test, but her performance rebounded quickly and all subsequent recall trials were normal. This pattern is most consistent with anxiety. Memory improves with structure, and consolidation remains intact. All other testing was normal. She endorsed very elevated anxiety during testing on self-report measures. Behaviorally, she was anxious and self-critical during testing, at one point referring to her cognition as "dumb ass level."     Mild executive dysfunction is a relatively nonspecific finding. I suspect that high anxiety levels were a factor in her performance, as this can cause the kind of sporadic inefficiency we see in her profile. It is also possible that there is a vascular contribution, particularly given the age-advanced microvascular changes on her MRI. Chronically poor sleep is likely an exacerbating factor as well. Encouragingly, testing is NOT suggestive of a cortical neurodegenerative process, such as Alzheimer's disease. A mildly dysexecutive profile does not rule out an emerging synucleinopathy, and we can continue to monitor. But I have lower suspicion for this and higher suspicion for vascular etiology with additional interference from sleep and stress. Given intact " iADLs, she meets criteria for mild neurocognitive disorder.     IMPRESSIONS      1. Mild neurocognitive disorder due to multiple etiologies, without behavioral disturbance        2. Cognitive change  Ambulatory referral/consult to Adult Neuropsychology      3. Abnormal brain MRI        4. Primary insomnia        5. Anxiety            PLAN     Ms. Dominguez is scheduled for feedback. Will discuss results, recommendations, provide handouts.   Will discuss sleep hygiene strategies, but can also consider CBT for insomnia  Continue to follow with behavioral health providers   Continue to follow with neurology as indicated   Repeat testing 1 year         Thank you for allowing me to participate in Ms. Dominguez's care.  If you have any questions, please contact me.    Camille Lundy PsyD  Clinical Neuropsychologist  Department of Neurology  [facility]        SUBJECTIVE:     HISTORY OF PRESENT ILLNESS   Ms. Dominguez is a 62 y.o. White female with a history of tremor, for lumbar radiculopathy, anxiety, HLD, acquired hypothyroidism, history of bariatric surgery  referred for neuropsychological evaluation. She was seen recently by neurology for tremor and gait imbalance, which began around 2018.     Neuro exam found mild postural tremor during finger to nose, as well as high frequency rest tremor bilaterally, most prominent in R thumb. Tandem walk was difficult.     Family history of dementia in her maternal great grandmother. Pt's maternal grandmother had series of mini strokes, which caused memory loss.     Cognitive Symptoms:  Attention: Losing her train of thought, forgets why she walked into a room  Mental Speed: Endorsed slowed thinking   Memory:Frequently misplacing keys and phone.   Language: Increased word finding, circumlocution  Visuospatial/Perceptual: No changes to sense of direction. Not getting lost.   Executive Functioning: Planning, organizing, multitasking. Got laid off 2019, semi-retired now so she  "admits she hasn't been doing as much. Did a few odd jobs after, which helped build her confidence. No issues keeping up at any of her jobs   Orientation: Fully oriented     Onset/Course: Ms. Cades symptoms were gradual in onset around 2019 and are worsening. Symptoms became more obvious after she got laid off from work. Symptoms are worse with stress, poor sleep, pain. No waxing/waning of cognitive status.  Medication for Cognition: None    Sleep: decreased - "I really don't sleep well. I suffer with insomnia."   Pattern: has difficulty falling asleep and has restless sleep. Variable hours per night. Last night she got 4 hrs. Tries to get 8, but sometimes her sleep phase gets really delayed and it's hard to change - this happened even when she was working and getting up early. If she is stressed, will wake more frequently throughout the night. Falls asleep with the TV on. Gets in bed at the same time every night but cannot fall asleep for hours. In the past has used sleep aid, which helped with initiation but not maintenance. Currently takes clonazepam at night.   KACY: Used to have KACY, used CPAP. Had gastric sleeve in 2016, KACY resolved. Not snoring anymore.   Acting out dreams: When she is very stressed, has woken up screaming.  has Parkinson's, acts out his dreams. She does jump around in her sleep. No recent sleep study.   Daytime Naps: Denied  Appetite: normal   Energy: normal     Mood:  Treatment History: Takes Effexor, clonazepam at night for sleep. Follows with a therapist.   Self-Described Mood: "It's been ok."   Depressed Mood: Endorsed - on and off for the last 37 years, since she had her daughter. Feels these are pretty well controlled recently.   Anxiety: Endorsed - always been a worrier.    Panic Attacks: Denied    Current Stressors: daughter and  are having marital issues. 's Parkinson's diagnosis in 2022  SI/HI: Denied. Did have a sister who committed suicide in "     Neuropsychiatric:  Personality Change: Denied    Delusional/Paranoid Thinking: Denied  Hallucinations: Denied  Impulsive/Compulsive Behaviors: Denied  Disinhibition: Denied  Apathy: Denied  Irritability/Agitation: Denied    Physical Sx:  Motor:  Mild postural tremor, worse on the left. Also high frequency rest tremor bilaterally noted on exam, most prominent in R thumb. Pt has not noticed this. No trouble swallowing.   Gait:  Pt ambulates independently.  and unsteady. 3 to 4 falls in the last 6 months. Does have a bulging disc in her back.  Sensory: No change to taste, smell, or vision. Sometimes hard for her to hear with a lot of background noise.   Pain: Ms. Dominguez described typical pain at a 0 on a scale of 1-10, with 10 being worst. Does have some pain if she overexerts herself with back injury    Current Functioning (I/ADLs):  ADLs: Independent   IADLs:  Finances: Independent   Medication Mgmt:Independent   Driving: Independent   Household Mgmt: Independent   Vocational:  Retired  Safety Concerns: None      RELEVANT HISTORY:  Prior Testing:  None    Neurologic History  Seizures: Denied  Stroke: Denied  TBI: Did get a concussion once falling off a stepladder. No cognitive sequelae   Movement Disorder: Endorsed - does have tremor, favoring ET   Falls: Endorsed  Urinary Incontinence: Endorsed - increased urgency recently. Harder for her to hold it, will have some leakage.   Chronic UTI's:  Denied  Dysautonomia: dizziness upon standing (less now that she is more hydrated) and urinary symptoms, occasional constipation (normal colonoscopy)     Substance Use History:  Social History     Tobacco Use    Smoking status: Former     Packs/day: 0.50     Years: 7.00     Pack years: 3.50     Types: Cigarettes     Quit date: 1982     Years since quittin.4    Smokeless tobacco: Never   Substance and Sexual Activity    Alcohol use: Yes     Alcohol/week: 0.0 standard drinks     Types: 2 - 4 Glasses of wine per  "week     Comment: weekends    Drug use: No    Sexual activity: Yes     Partners: Male     Caffeine               1 cup coffee qam     Family History:  Family History   Problem Relation Age of Onset    Sleep apnea Mother     Cancer Mother         bladder sarcoma, smoking    Sleep apnea Father     Depression Father         Bipolar    Cancer Father         colon    Colon cancer Father     Depression Sister         suicide    No Known Problems Brother     No Known Problems Daughter     Cancer Maternal Grandmother         lymphoma    Heart disease Paternal Grandmother     Melanoma Neg Hx     Skin cancer Neg Hx     Breast cancer Neg Hx     Ovarian cancer Neg Hx      Family Neurologic History: dementia in maternal grandmother (vascular?) and great grandmother   Family Psychiatric History: sister  by suicide     Developmental/Academic Hx:  Developmental: Born and raised in [place].  Product of a normal pregnancy and delivery. No developmental delays. English is their only language.   Academic:  Learning Difficulties: "I didn't apply myself, but I did fine. Average student." No history of formal learning disorder diagnosis. Never repeated a grade.  Attention Difficulties: Denied. Never diagnosed with or treated for ADHD.  Behavioral Difficulties: Denied  Educational Attainment: 12    Social/Occupational Hx:  Occupational:  Occupational Status: laid off , now retired. Laid off unexpectedly after 30 years, they told her the week before [place] on a Friday.   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   Social:  Resides: at home with    Current Relationship/Family Status:  since , second marriage for each. One daughter from prior marriage.   Primary Source of Support:  Pt endorses adequate social support.   Daily Activities: Has had a little trouble figuring out how to spend her time since half-way. " Looking for places to volunteer, has worked a few odd jobs. Loves music, would like to start playing again. Daughter is a music therapist. Also enjoys animals       Objective:     Neurodiagnostics:  Results for orders placed or performed during the hospital encounter of 12/03/12   MRI Brain W WO Contrast    Narrative    Procedure: MRI the brain with andwithout contrast.    Technique: Sagittal and axial T1, axial T2, axial FLAIR, axial gradient, axial diffusion, and axial, sagittal, and coronal postcontrast T1 images of the whole brain.   ml of Gadavist injected intravenously.         Comparison: None    Findings: The brain parenchyma is normal in contour. There are no abnormal areas of parenchymal  enhancement. there are several punctate foci of T2/flair signal hyperintensity in the supratentorial white matter these are nonspecific and of uncertain   clinical significance differential considerations include advanced chronic microvascular ischemic change for age. There are no areas are restricted diffusion to suggest acute infarction. The ventricles are normal in size and configuration without   evidence for hydrocephalus. There are no abnormal areas of the gradient susceptibility to suggest parenchymal hemorrhage.  the major intracranial T2  flow-voids are present.  Thornwaldt cyst midline nasopharynx    Impression     Several punctate foci of T2/flair signal hyperintensity supratentorial white matter which are nonspecific differential considerations to include chronic microvessel ischemic change advanced for age.     No evidence for acute infarction or enhancing lesion.    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      Electronically signed by: YUKI CURRY DO  Date:     12/03/12  Time:    09:18    Results for orders placed or performed during the hospital encounter of 10/26/12   CT Head Without Contrast    Narrative    CT head without contrast    Comparison: None.    Technique: 5-mm axial images of the head were  performed without the administration of contrast.  Sagittal and coronal reformatted images were also obtained.    Findings:  There is mild cerebral white loss appropriate for age.  Mild patchy decreased attenuation in the periventricular white matter suggestive of chronic microvascular ischemic change.  No evidence of hydrocephalus.  No evidence of acute intracranial   hemorrhage or major vascular distribution infarction.  There are no extra-axial fluid collections.  Visualized portions of the paranasal sinuses and mastoid air cells are clear.  No evidence of fracture or other bony animality.    Impression     No acute intracranial abnormality detected.    Mild patchy decreased attenuation in the periventricular white matter consistent with chronic microvascular ischemic change.   ______________________________________     Electronically signed by resident: Devon Syed  Date:     10/26/12  Time:    16:18      As the supervising and teaching physician, I personally reviewed the images and resident's interpretation and I agree with the findings.      ______________________________________  Electronically signed by: URIEL GARCES MD  Date:     10/26/12  Time:    17:21        Pertinent Labs:  Lab Results   Component Value Date    EUYWAZDB11 1223 (H) 12/01/2022     No results found for: VITAMINB1  No results found for: RPR  Lab Results   Component Value Date    FOLATE 11.4 05/13/2016     Lab Results   Component Value Date    TSH 2.425 12/01/2022     Lab Results   Component Value Date    .0 (H) 05/13/2016    CALCIUM 9.7 08/15/2019      Lab Results   Component Value Date    HGBA1C 5.0 08/15/2019     Lab Results   Component Value Date    TLV70JUHM Negative 05/13/2015           Current Outpatient Medications:     cholecalciferol, vitamin D3, (VITAMIN D3) 25 mcg (1,000 unit) capsule, Take 2 capsules (2,000 Units total) by mouth once daily., Disp: 60 capsule, Rfl: 12    clonazePAM (KLONOPIN) 0.5 MG tablet, Take 0.5 mg  by mouth once daily., Disp: , Rfl:     cyanocobalamin (VITAMIN B-12) 1000 MCG tablet, Take 1 tablet (1,000 mcg total) by mouth once daily., Disp: 30 tablet, Rfl: 12    fluticasone (FLONASE) 50 mcg/actuation nasal spray, SHAKE LIQUID AND USE 2 SPRAYS(100 MCG) IN EACH NOSTRIL EVERY DAY, Disp: 48 mL, Rfl: 0    gabapentin (NEURONTIN) 100 MG capsule, TAKE 1 CAPSULE(100 MG) BY MOUTH THREE TIMES DAILY, Disp: 90 capsule, Rfl: 0    levothyroxine (SYNTHROID) 88 MCG tablet, Take 1 tablet (88 mcg total) by mouth before breakfast., Disp: 90 tablet, Rfl: 3    lorazepam (ATIVAN) 0.5 MG tablet, , Disp: , Rfl: 2    naproxen (NAPROSYN) 500 MG tablet, Take 500 mg by mouth 2 (two) times daily., Disp: , Rfl:     sars-cov-2, covid-19, (MODERNA COVID-19) 50 mcg/0.25 ml injection (BOOSTER), Inject into the muscle., Disp: 0.25 mL, Rfl: 0    venlafaxine (EFFEXOR) 50 MG Tab, TK 1 T PO  TID, Disp: , Rfl: 2  No current facility-administered medications for this visit.    Facility-Administered Medications Ordered in Other Visits:     dextrose 5 % and 0.45 % NaCl infusion, , Intravenous, Continuous, Hansel Shell MD, New Bag at 08/13/18 0843    dextrose 5 % and 0.45 % NaCl infusion, , Intravenous, Continuous, Hansel Shell MD, Last Rate: 20 mL/hr at 08/13/18 0755, New Bag at 08/13/18 0755    sodium chloride 0.9% flush 3 mL, 3 mL, Intravenous, PRN, Hansel Shell MD    Medical history  Patient Active Problem List   Diagnosis    Anxiety    Glucose intolerance (impaired glucose tolerance)    Mixed hyperlipidemia    Acquired hypothyroidism    Family history of colon cancer: father in his 60s    History of bariatric surgery: sleeve 2017    Colon polyp, hyperplastic 2018; adenoma 2021 repeat 3 years    Osteopenia of multiple sites: see DEXA 7/21    Diverticulosis: see colonoscopy 2021    Lumbar radiculopathy    Weakness    Decreased mobility and endurance     Past Medical History:   Diagnosis Date    Allergy     Anxiety     Colon polyp,  hyperplastic: see colonoscopy 2018    Depression     Family history of colon cancer: father in his 60s 2016    Glucose intolerance (impaired glucose tolerance) 2012    Headache(784.0)     History of bariatric surgery 2018    Hypothyroidism     Lumbar radiculopathy 10/25/2022    Memory loss     Menopause syndrome     Mitral valve disorders(424.0) 2012    Obesity     Other and unspecified hyperlipidemia     Sleep apnea: per sleep study 2015; CPAP at 11 cm recommended 2015    Special screening for malignant neoplasms, colon 2015    Thyroid disease      Past Surgical History:   Procedure Laterality Date    APPENDECTOMY  2015    BREAST CYST ASPIRATION       SECTION, CLASSIC      COLONOSCOPY N/A 2018    Procedure: COLONOSCOPY;  Surgeon: Hansel Shell MD;  [facility]  Service: Endoscopy;  Laterality: N/A;    COLONOSCOPY N/A 2021    Procedure: COLONOSCOPY;  Surgeon: Martínez Amin MD;  [facility]  Service: Endoscopy;  Laterality: N/A;  8/10 - LVM about cancelling procedure-   pt completed COVID vaccine- see Immunization record in chart-rb    FRACTURE SURGERY      HERNIA REPAIR      umbilical hernia    NOSE SURGERY      SLEEVE GASTROPLASTY      TRANSFORAMINAL EPIDURAL INJECTION OF STEROID Bilateral 2022    Procedure: TFESI (B/L) L4/5;  Surgeon: Jorge Shipman DO;  Location: [facility];  Service: Pain Management;  Laterality: Bilateral;           OBJECTIVE:       MENTAL STATUS AND BEHAVIORAL OBSERVATIONS:  Appearance:  Casually dressed and Well groomed  Behavior:   alert, calm, cooperative, rapport easily established, and Appropriate interpersonal skills. Good interpretation of nonverbal cues.   Orientation:   Fully oriented  Sensory:   Hearing and vision appeared adequate for testing purposes.  Gait:   Unremarkable and Pt ambulates independently.   Psychomotor:  LUE action tremor  Speech:  Fluent  and spontaneous. Normal volume, rate, pitch, tone, and prosody.  Language:  Receptive and expressive language appear intact. Comprehends conversational speech., No evidence of word-finding difficulties in conversational speech.  Mood:   anxious  Affect:   mood-congruent  Thought Process: goal directed and linear  Thought Content: Denied current SI/HI. and No evidence of psychotic symptoms.  Judgment/Insight: Grossly intact  Validity:  Performance on standalone and embedded performance validity measures all suggested adequate engagement. However, behaviorally she was very self-critical and anxious during testing, which may have impacted her performance. As a result, some scores may underestimate her actual abilities.  Test Taking Bx:  Per psychometrist observations, Mrs. Dominguez arrived to testing with her . She wore trifocals/progressive lenses and reported no problems with hearing. She had slight tremors in her left hand. She immediately expressed she has anxiety and was very nervous towards testing. She was self-critical as she called herself 'dumb ass' and wanted to know if they had a 'dumb ass level' of testing. She required reassurance and encouragment to try her best.       PROCEDURES/TESTS ADMINISTERED:  In addition to performing a review of pertinent medical records, reviewing limits to confidentiality, conducting a clinical interview, and explaining procedures, the following measures were administered:   Green's MSVT, Test of Premorbid Functioning (TOPF), Wechsler Adult Intelligence Scale - Fourth Edition (WAIS-IV), selected subtests, Ruff 2&7 Selective Attention Test , Neuropsychological Assessment Battery (NAB) Naming subtest, Controlled Oral Word Association Test (FAS/Bella et al., 2004), Animal Naming (Bella et al., 2004), Trail Making Test (Bella et al., 2004), Stroop Color Word Test, [place] Card Sorting Test (WCST-64), Luis Felipe Complex Figure Test (Copy Trial) , [place] Verbal Learning Test  - Second Edition (CVLT-II), Standard Form, Wechsler Memory Scale - Fourth Edition (WMS-IV), selected subtests, State Trait Anxiety Inventory: Short Form (STAI-SF), and Vieyra Depression Inventory - Second Edition (BDI-II). Manual norms were used unless otherwise indicated.     NEUROPSYCHOLOGICAL ASSESSMENT RESULTS:  The following should not be interpreted in isolation from the neuropsychological evaluation report.  Scores on stand-alone and embedded performance validity measures were WNL.      Raw Score Type of Standardized Score Standardized Score Percentile/CP Descriptor   MSVT  - - - -   MSVT  - - - -   MSVT Cons 100 - - - -   MSVT  - - - -   MSVT FR 80 - - - -   ACS LM II Rec 23 - - - -   ACS VR II Rec 5 - - - -   ACS RDS 10 - - - -   CVLT-II  - - - -   PREMORBID FUNCTIONING Raw Score Type of Standardized Score Standardized Score Percentile/CP Descriptor   TOPF simple dem. eFSIQ -  55 Average   TOPF pred. eFSIQ -  50 Average   TOPF simple + pred. eFSIQ -  53 Average   LANGUAGE FUNCTIONING Raw Score Type of Standardized Score Standardized Score Percentile/CP Descriptor   WAIS-IV Similarities 28 ss 11 63 Average   TOPF Word Reading 39 SS 98 45 Average   NAB Naming 31 Tscore 56 73 Average   FAS 38 Tscore 49 46 Average   Animal Naming 22 Tscore 55 69 Average   VISUOSPATIAL FUNCTIONING Raw Score Type of Standardized Score Standardized Score Percentile/CP Descriptor   WAIS-IV DANY - SS 77 6 Below Average   WAIS-IV Block Design 24 ss 7 16 Low Average   WAIS-IV Matrix Reasoning 7 ss 5 5 Below Average   RCFT Copy 35 - - >16 WNL   RCFT Time to Copy 243 - - >16 WNL   VR Copy 43 - - >75 High Average   LEARNING & MEMORY Raw Score Type of Standardized Score Standardized Score Percentile/CP Descriptor   CVLT-II         Trials 1-5 (T-Score) 38 Tscore 42 21 Low Average   List A Trial 1 3 zscore -2 2.275 Below Average   List A Trial 5 11 zscore -0.5 31 Average   List B 6 zscore 0 50 Average    SDFR 8 zscore -0.5 31 Average   SDCR 10 zscore -0.5 31 Average   LDFR 11 zscore 0 50 Average   LDCR 12 zscore 0 50 Average   Semantic Clustering 1.1 zscore 0.5 69 Average   Learning [place] 2 zscore 1 84 High Average   Repetitions 7 zscore 0.5 69 Average   Intrusions 19 zscore 3 100 Exceptionally High   Recognition Hits 16 zscore 0.5 69 Average   False Positives 10 zscore 2.5 99 Exceptionally High   Discriminability 2.3 zscore -1 16 Low Average   WMS-IV         Auditory Immediate (additional score) - SS 97 42 Average   Auditory Delayed (additional score) -  50 Average   Auditory Memory - SS 98 45 Average   WMS-IV Subtests         LM I 28 ss 11 63 Average   LM II 22 ss 10 50 Average   LM Recognition 23 - - 26-50 Average   (CVLT-II Trials 1-5) 42  8 25 Average   (CVLT-II Long Delay) 0  10 50 Average   VR I 26 ss 6 9 Low Average   VR II 26 ss 11 63 Average   VR II Recognition 5 - - 26-50 Average   VR Copy 43 - - >75 High Average   ATTENTION/WORKING MEMORY Raw Score Type of Standardized Score Standardized Score Percentile/CP Descriptor   WAIS-IV WMI - SS 83 13 Low Average   WAIS-IV Digit Span 26 ss 10 50 Average         DS Forward 12 ss 12 75 High Average         DS Backward 8 ss 10 50 Average         DS Sequence 6 ss 7 16 Low Average         Longest Forward 8 - - - -         Longest Backward 4 - - - -         Longest Sequence 4 - - - -   WAIS-IV Arithmetic 7 ss 4 2 Below Average   Ruff 2&7 Total Speed 88 Tscore 46 34 Average   Ruff 2&7 Total Accuracy 87 Tscore 43 25 Average   Ruff 2&7 Total Difference  3  NS - -   MENTAL PROCESSING SPEED Raw Score Type of Standardized Score Standardized Score Percentile/CP Descriptor   WAIS-IV Coding 50 ss 8 25 Average   TMT A  40 Tscore 42 21 Low Average   TMT A errors 0 - - - -   Stroop: Word Reading 90 Tscore 44 27 Average   Stroop: Color Naming 56 Tscore 36 8 Below Average   EXECUTIVE FUNCTIONING Raw Score Type of Standardized Score Standardized Score Percentile/CP Descriptor    TMT B 148 Tscore 36 8 Below Average   TMT B errors 1 - - - -   Stroop: C/W 28 Tscore 43 25 Average   Stroop: Interference -6 Tscore 44 27 Average   Total Correct 47 SS - - -   Total Errors 17 SS 99 47 Average   Perseverative Resp. 4  90 High Average   Perseverative Err. 4  92 Above Average   Nonperseverative Err. 13 SS 80 9 Low Average   Concept. Level Response 43 SS 95 37 Average   Categories Completed 3 - - >16 WNL   FMS 0 - - N/A N/A   Learning to Learn 5 - - >16 WNL   WAIS-IV Similarities 28 ss 11 63 Average   WAIS-IV Matrix Reasoning 7 ss 5 5 Below Average   MOOD & PERSONALITY Raw Score Type of Standardized Score Standardized Score Percentile/CP Descriptor   BDI-2 8 - - - Minimal   STAISF:State 31 - - 98 Exceptionally High   STAISF:Trait 18 - - 58 Average   ss = scaled score (mean = 10, SD = 3); SS = standard score (mean = 100, SD = 15); Tscore mean = 50, SD = 10; zscore (mean = 0.00, SD = 1)         Billing/Services Summary          Neurobehavioral Status Exam Base Code (72360)  Total Units: 0    Face-to-face Total Time: 0 min.         Professional Neuropsychological Testing Evaluation Services Base Code (11719)   Total Units: 1 Add-on (61161)  Total Units: 3   Referral review/initial test selection 15 min.    Intra-session Clinical Decision-Making          Tech consult/test review/modification 0 min.         Patient behavior management 0 min.    Face-to-face Interpretive Feedback 60 min.    Record Review/Integration/Report Generation 180 min.     Total Time: 255 min.         Test Administration by Psychologist Base Code (68349)   Total Units: 0 Add-on (37539)  Total Units: 000   Testing 0 min.    Scoring 0 min.     Total Time: 0 min.         Test Administration by Technician  Technician Name: ABIEL Kiser Base Code (13380)   Total Units: 1 Add-on (27527)\  Total Units: 6   Face-to-Face Testin min.    Scoring 75 min.     Total Time: 215 min.    DOS is the date of the evaluation unless  specified

## 2023-05-03 ENCOUNTER — OFFICE VISIT (OUTPATIENT)
Dept: NEUROLOGY | Facility: CLINIC | Age: 63
End: 2023-05-03
Payer: COMMERCIAL

## 2023-05-03 DIAGNOSIS — R90.89 ABNORMAL BRAIN MRI: ICD-10-CM

## 2023-05-03 DIAGNOSIS — F06.70 MILD NEUROCOGNITIVE DISORDER DUE TO MULTIPLE ETIOLOGIES, WITHOUT BEHAVIORAL DISTURBANCE: Primary | ICD-10-CM

## 2023-05-03 DIAGNOSIS — F51.01 PRIMARY INSOMNIA: ICD-10-CM

## 2023-05-03 DIAGNOSIS — F41.9 ANXIETY: ICD-10-CM

## 2023-05-03 PROCEDURE — 99499 UNLISTED E&M SERVICE: CPT | Mod: S$GLB,,, | Performed by: PSYCHIATRY & NEUROLOGY

## 2023-05-03 PROCEDURE — 99999 PR PBB SHADOW E&M-EST. PATIENT-LVL I: CPT | Mod: PBBFAC,,, | Performed by: PSYCHIATRY & NEUROLOGY

## 2023-05-03 PROCEDURE — 99499 NO LOS: ICD-10-PCS | Mod: S$GLB,,, | Performed by: PSYCHIATRY & NEUROLOGY

## 2023-05-03 PROCEDURE — 99999 PR PBB SHADOW E&M-EST. PATIENT-LVL I: ICD-10-PCS | Mod: PBBFAC,,, | Performed by: PSYCHIATRY & NEUROLOGY

## 2023-05-03 NOTE — PATIENT INSTRUCTIONS
Summary of the evidence on modifiable risk factors for cognitive decline and dementia             From: Janet, Theo Heath, Tiara, Lien & Abril (2015). Summary of the evidence on modifiable risk factors for cognitive decline and dementia: A population-based perspective. Alzheimer's & Dementia, 11, 314-234.    *Studies suggest small or moderate alcohol consumption (no more than 1 drink per day) by older individuals may decrease the risk of cognitive decline and dementia. The evidence is not strong enough, however, to suggest those who do not drink should start drinking, especially when weighed against the potential negative effects of excessive alcohol consumption, such as an increased risk of falls among older adults. Research also generally suggests that red wine may be the best form of alcohol for cognitive functioning, in part, due to its antioxidant effects. All alcohol use is cautioned, though, as alcohol could cause harmful effects and interfere with medications. A physician and/or pharmacist should be consulted before alcohol is consumed.          Behaviors to Promote Brain Health       Exercise regularly - Exercise has many known benefits, and it appears that regular physical activity benefits the brain. Multiple research studies show that people who are physically active are less likely to experience a decline in their mental function and have a lower risk of developing Alzheimer's disease. We believe these benefits are a result of increased blood flow to your brain during exercise. It also tends to counter some of the natural reduction in brain connections that occur during aging, in effect reversing some of the problems. Aim to exercise several times per week for 30-60 minutes. You can walk, swim, play tennis or any other moderate aerobic activity that increases your heart rate.    Eat a Mediterranean diet - Your diet plays a large role in your brain health. Consider following a Mediterranean  diet, which emphasizes plant-based foods, whole grains, fish and healthy fats, such as olive oil. It incorporates much less red meat and salt than a typical American diet. Studies show people who closely follow a Mediterranean diet are less likely to have Alzheimer's disease than people who don't follow the diet. Omega fatty acids found in extra-virgin olive oil and other healthy fats are vital for your cells to function correctly, appear to decrease your risk of coronary artery disease, and increase mental focus and slow cognitive decline in older adults.       Stay mentally active - Your brain is similar to a muscle -- you need to use it or you lose it. There are many things that you can do to keep your brain in shape, such as doing crossword puzzles or Sudoku, reading, playing cards or putting together a jigsaw puzzle. Consider it cross-training your brain. So incorporate different activities to increase the effectiveness.     Stay socially involved - Social interaction helps bruce off depression and stress, both of which can contribute to memory loss. Look for opportunities to connect with loved ones, friends and others, especially if you live alone. There is research that links solitary confinement to brain atrophy, so remaining socially active may have the opposite effect and strengthen the health of your brain.    Get plenty of sleep - Sleep plays an important role in your brain health. There are some theories that sleep helps clear abnormal proteins in your brain and consolidates memories, which boosts your overall memory and brain health. It is important that you try to get seven to eight consecutive hours of sleep per night, not fragmented sleep of two- or three-hour increments. Consecutive sleep gives your brain the time to consolidate and store your memories effectively.       Heart Health and Brain Health    What's bad for your heart is bad for your brain!    In order to keep your brain healthy, you need  to keep your heart healthy. Your brain needs more oxygen than any other organ in your body, and it depends on a healthy cardiovascular system to deliver oxygen and nutrients to brain cells. Brain blood vessels are particularly susceptible to damage due to high blood pressure, which can lead to scarring and narrowing of the vessels over time. Eventually, parts of the brain tissue itself may become damaged when it lacks access to oxygen and nutrients. This damage may begin in midlife (ages 45-65) and can make your brain slower and less efficient. The damaged brain tissue is also more vulnerable to stroke or developing a neurodegenerative condition, such as Alzheimer's disease.  If you are a smoker, the risk is even higher, as smoking will also damage delicate brain blood vessels.     We have very high rates of hypertension in [place], and it is frequently associated with subjective cognitive decline (SCD).         2015 Behavioral Risk Factor Surveillance System. Prepared by the Alzheimers Association    In order to promote good cognitive and brain health, it is important to prevent, delay, and manage conditions that can impact blood flow to the brain, such as:  High blood pressure  High cholesterol   Diabetes  Coronary artery disease  Obesity  Atrial fibrillation   Sleep apnea    Ways to promote good cardiovascular and brain health include:   Follow up regularly with your doctor and take your medications as directed   Eat a healthy diet  Get regular exercise  Quit smoking  Limit alcohol consumption       Information adapted from:  https://www.alz.org/media/Documents/healthy-brain-initiative-protecting-heart-and-brain.pdf        Sleep Hygiene:     Avoid watching TV, eating, and discussing emotional issues in bed. The bed should be used for sleep and sex only. If not, we can associate the bed with other activities and it often becomes difficult to fall asleep.  Minimize noise, light, and temperature extremes  "during sleep with ear plugs, window blinds, or an electric blanket or air conditioner. Even the slightest nighttime noises or luminescent lights can disrupt the quality of your sleep. Try to keep your bedroom at a comfortable temperature -- not too hot (above 75 degrees) or too cold (below 54 degrees).  Try to go to bed and wake up at the same time every day. A strict routine can help encourage stability in your circadian rhythm. Get out of bed even if you're still tired - sleeping in much later will make it harder to fall asleep the next night, and the cycle will continue. You may need to run a "sleep deficit" for a day to help you fall asleep more easily.    Do not watch TV in bed, and do not fall asleep to TV. Many people say leaving the TV on helps them fall asleep. However, the flickering "blue light" released by screens  is absorbed even through closed eyelids, and suppresses melatonin release in your brain. This can cause us to linger in the lightest stages of sleep, and miss out on more restorative, deeper sleep. A better option might be a white noise machine or chanelle without any light.   Try not to drink fluids after 8 p.m. This may reduce awakenings due to urination.  Avoid naps, but if you do nap, make it no more than about 25 minutes about eight hours after you awake. But if you have problems falling asleep, then no naps for you.  Do not expose your self to bright light if you need to get up at night. Use a small night-light instead. Blue light can be particularly detrimental, including the light from your cellphone, TV, or computer screen.  Nicotine is a stimulant and should be avoided particularly near bedtime and upon night awakenings. Having a smoke before bed, although it may feel relaxing, is actually putting a stimulant into your bloodstream.  Caffeine is also a stimulant and is present in coffee (100-200 mg), soda (50-75 mg), tea (50-75 mg), and various over-the-counter medications. Caffeine should " "be discontinued at least four to six hours before bedtime. If you consume large amounts of caffeine and you cut your self off too quickly, beware; you may get headaches that could keep you awake. Sometimes people feel they are "immune" to the effects of caffeine, and that a warm cup of coffee before bed actually helps them relax. But similar to nicotine, despite that it may feel relaxing in the moment, you are still consuming a potent stimulant, and it will act on your nervous system throughout the night causing restless and disrupted sleep even if you don't notice feeling jittery at bedtime.   Avoid alcohol in the evenings. Although alcohol is a depressant and may help you fall asleep, the subsequent metabolism that clears it from your body when you are sleeping causes a withdrawal syndrome. This withdrawal causes awakenings and is often associated with nightmares and sweats.  A light snack may be sleep-inducing, but a heavy meal too close to bedtime interferes with sleep. Stay away from protein and stick to carbohydrates or dairy products. Milk contains the amino acid L-tryptophan, which has been shown in research to help people go to sleep. So milk and cookies or crackers (without chocolate) may be useful and taste good as well.  Be active during the day. Get regular exercise, help burn off excess energy, and promote more sound sleep at night.   Try to get outside into the sunlight at least once per day (with sunscreen, of course!). Your circadian rhythm is primarily regulated by the light coming through your eyes, so making sure it's fully dark at night and making sure you get some sunlight during the day can reinforce your circadian rhythm.   Use mindfulness or meditation strategies to quiet a busy mind. Many people report having difficulty sleeping due to being unable "turn off" their minds. Practicing mindfulness exercises before bed - like Progressive Muscle Relaxation or a body scan - can help promote " relaxation and aid in anxiety reduction. Apps such as HeadSpace and Calm can be useful, and there are many freely available mindfulness videos on YouTube. If anxiety continues to interfere with your sleep, seeing a behavioral health professional to learn anxiety reduction strategies can be helpful.   If you are still struggling with sleep, or struggling to implement any of these tips, behavioral health professionals who are specially trained in sleep medicine can be helpful. Modalities such as Cognitive Behavioral Therapy for Insomnia (CBT-I) can be particularly useful.   Your Healthy Brain - Strategies to help with learning and memory      Type of Memory Issue      Cognitive Strategy   Attention-based trouble Remember that inattention and lack of focus are major culprits to forgetting information so be sure and practice paying attention for adequate learning of information. Patients will rely on passive attention to remember something (e.g., yeah, uh-huh approach) and find they cannot recall it later. We recommend the following to improve attention, which may aid in later recall:   Look at the person as they are speaking to you, Paraphrase as they are speaking  Write down important pieces of information   Ask them to repeat if you zone out.   Simplify and reduce information that you need to focus on during conversation.   Have visual cues to remind you if you need to do something later.   Speed-based trouble Using multiple modalities (e.g., listening, writing notes, asking questions, recording, follow-up meetings with faculty/bosses, discussion boards online) to learn new information also can be helpful and is likely to allow additional time for processing, thus improving memory for the material.    Learning new information Simplify - Use easier words and shorter sentences. Break down into steps.  Restate - Put information into your own words.  Does it make sense? This allows you and others to test for  understanding.  Link - If possible, associate new information with something you already know.  Organize - Group items into meaningful categories.  You can organize by time, location, color, shape, size, function, and even age.  Be creative.   Break it up - Don't try to take in too much at one time. Concentrate for a few minutes, then move on to something else. You may learn more in short sessions than one long one.  Mnemonics (pronounced noo-mon-ics) are strategies whereby you form acronyms ( = Cereal, Oranges, Pizza) that stand for something. This may be useful at times when you need a cue or prompt to help you remember something. Word associations can also be helpful (Cassie works in the [facility] [employer] [facility]).     Storing information for later recall Rehearse - Immediately after seeing/hearing something, try to recall it.  Wait a few minutes, then check again.  Gradually lengthen the time between rehearsals. Initially, you might rehearse the same thing every minute, then 15-minutes, then every few hours.  Repetition of learned material is critical to ensure storage of information to be learned.   Self-test at home to ensure learning. Just because something was remembered once doesn't mean it will be remembered after it was first learned.   Have friends or family periodically re-quiz you multiple times in a study session.     Recalling Information Jog your memory - Lose something?  Think back to when you last had it.  What did you do next?  And after that?  Mentally walk yourself through each activity that followed.  Prodding your memory this way may enable you to recall the location of the missing item.  Pair something you always remember (keys, purse, phone) with something you may forget (grocery list, bill you need to pay).  Get organized - Have fixed locations for all important papers, key phone numbers, medications, keys, wallet, glasses, tools, etc.  Develop routines - Routines can anchor memories so  they do not drift away.      External Strategies Have memos, timers, calendar notes, etc.  in visible, appropriate places so you can use them for recalling information.  Invest in a smart phone and learn to use its reminder functions. This can be a way to interact with your children or grandchildren in a fun way.  Meet with a counselor to analyze and revise personal organization strategies and develop more effective memory cues and planning strategies. This recommendation is designed to help you develop a new skill set for personal organization based.   Maintaining a regular daily schedule may be beneficial. Keeping a calendar and notepad or alarms via a smart phone can alert you to the day's activities.   School-based learning Read the information and underline, then go back over and talk through what you just read. Break it up in paragraph bits so you keep what you have to learn to a minimum. Don't just rely on passively reading something.  Take notes in the form of an outline; Use note cards over and over  Create mnemonics (example: Please Excuse My Dear Aunlogan Richardson to remember mathematics order or operations)  Create acronyms (example: PEMDAS to remember math order of operations)  Utilize self-talk as you read through the material  Create broad headings of material you need to know and then talk or write down what you have learned from memory  Teach the material to a friend, parent, or sibling without notes.  Summarize facts in a table or flow charts for visual encoding   Memory Hygiene Engage in regular exercise, which increases alertness and arousal, which can improve attention and focus.   Get a good night's sleep, as sleep is necessary for memory consolidation. Caffeine intake in the afternoon/evening, stuffing oneself at supper, and using technology close to bedtime can decrease the quality of restful sleep throughout the night. Additionally, bedtime and wake-up times should be consistent every night and  morning so the body becomes used to a single sleep/awake routine.  Eat healthy foods and balanced meals. It is notable that research indicates certain nutrients may aid in brain function, such as B vitamins (especially B6, B12, and folic acid), antioxidants (such as vitamins C and E, and beta carotene), and Omega-3 fatty acids. Talk with your physician or nutritionist about what's best.   Prospective Memory (remembering to remember to do something) We recommend having visual cues (e.g., pill boxes) and alarms (e.g., phone alarm) set to remind you to do something in advance. It's important that loved ones and caregivers understand this difficulty for you. Daily lists of what you need to do can also be helpful.     Your Healthy Brain - Strategies to help with attention and focus    Type of Attention Problem Cognitive Strategy   Auditory Attention Establish eye contact and attention to the speaker to better remember instructions/directions.  Repeat back what the speaker has said or have a notepad and write down.   If you zone out, then politely say so and have them repeat what they said.   Classes often move fast and rarely will professors adjust their style to accommodate all the different preferences and needs of students. We encourage investing in a recording device that allows you to go back and re-listen to a lecture if you have missed something when taking notes.   Reduce noise that you find distracting. It may also be helpful to have some ambient noise (e.g., white noise, calm music, fan) if you can't reduce or eliminate noise.   Reduce auditory distractions. This might include turning off your phone so you aren't tempted to look at text messages when studying or listening.      Visual Attention Reduce visual distractions. For instance, stick to one page at a time and don't have multiple pages or screens up. Make a list of common distracters and have those in mind so you can prepare ahead of time.   Use  your  to your advantage by enlarging fonts, katlin, italicizing, and coloring, material.   Use visual cues to help you zero in on the material. Highlight and underline.   Be mindful that electronic devices (e.g., computers, tablets) are very prone to distractibility when attempting to read because you can be tempted to surf online, click a hyperlink, and so forth. Think about whether you prefer printed or electronic material and use that.   Our eyes can become exhausted for a while when engaging in visually based tasks. Take breaks to rest your eyes. Search online for eye relaxation exercises or talk with an optometrist.      Orienting Yourself/Monitoring/Planning Set up a plan for the day, week, and semester/month. This includes the types of classes scheduled (e.g., matching difficult and easy classes), times of day you schedule them (e.g., attempt to schedule classes at a time when most attentive, try to have some breaks between classes), which teacher/professor (e.g., some are harder than others), and types of classes (e.g., try to schedule classes with three, 50-minute lectures rather than one 3-hour class).   plan meals, exercise, and relaxation time into the schedule. This is called pacing whereby you have some strategies to manage fatigue, hunger, and restlessness that impact attention.   Use active mental strategies to avoid attentional lapses. For instance, frequently ask: (1) What I am currently doing? (2) What was I doing before this? (3) What do I need to do next? You'll likely need an external cue for some time (e.g., alarm on phone, visual reminder) to integrate this into your day.       Sustained Attention Have realistic expectations and plan out what you can do in a given hour, day, and weeklong period. This planning involves writing down what you need to do and chunking it accordingly. For instance, you would make a list of all the steps involved in a given task, think  about the time involved in each step, and plan what you can do in a given time period before your attention wanes.   Check off tasks as you do them. This feels good and helps keep you on track.   Avoid procrastination as this requires more sustained attention as you know the deadline is looming.   Don't push yourself so hard that you get frustrated and give up. It is also important to be practical about what you can and cannot do in a given time period. Don't be hard on yourself if you need more time or more breaks. The point is find what works for you and do that so you optimize performance.   Pay attention to your own vocational and personal interests in a task. It may be that you have more sustained attention for some tasks and not for others.   Set up structure so you aren't tempted to immediately distract yourself. This may mean unplugging the television, turning off the computer, and/or going somewhere (e.g., coffee shop, library) to limit distractions.      Divided Attention/Multi-tasking Most people have limited divided attention and research has shown that we are less effective when we are doing several things at once (e.g., checking email, reading online, & watching television). Try to methodically engage in one task at a time to limit problems with divided attention.   Set up some organizers so you can move from one separate task to another.   Have visual cues to keep you on track when you have a lot to do. This will re-orient you and remind you. For instance you may be doing something, get distracted, but then see your notepad that has your next task.    External Strategies Modifying the physical space to improve attention. You may want to think about how your house, office, or the classroom impact your attention. If you can't modify something, then think about how other means. For instance, you may not be able to make your house quiet if you have children, but getting some ear plugs or ambient noise may  improve this. Or, post a do not disturb sign when you need some space for studying or work.   Get organized to reduce distractibility! Develop files at home, on your computer, and even with your email.   Social support is important and many people have or have had attention problems. Talk with your family, coworkers, classmates about what they can do to help you. They may have even developed their own strategies that prove useful to you.   If you can afford to, then think about electronic devices. They have alarms to remind you along with voice recorders. Keep a planner with you or use your phone planner         Your Healthy Brain - Strategies to help with executive functions      Type of Executive Dysfunction      Cognitive Strategy   Initiation and Drive   (getting started on things) This can be exhibited behaviorally, such that they cannot get started on physical activities such as getting up or working out, socially such that they have difficulty calling friends or going out to be with friends, academically/occupationally, such that they have trouble getting started on assignments, or cognitively, such that they have difficulty coming up with ideas or generating plans.    Basic tenets of intervention include providing additional external structure, prompting and cueing, and helping organize and plan.    Increased structure in the environment or in an activity can help with initiation difficulties.  Building in routines for everyday activities is often important, since routine tasks become more automatic, reducing the need for independently starting something.  For example, routines can be broken down into a sequence of steps, and these steps can be written down on index cards or a simple list.  You can then follow the list of steps each day as needed until the routine becomes automatic.    External prompting may be necessary to get started.  Someone might stop by the desk at the outset of each task and prompt  them to start work, or perhaps demonstrate the first problem of a worksheet.  At home, parents might need to gently prompt to get started on homework, chores, or to go out with friends.   Some people benefit from having time limits set for completing a task or setting specific times of day when they will work on a task.  Use of a timer may facilitate increased initiation and speed of task completion.  Problems with initiating may be exacerbated by feeling overwhelmed with a given task.  Breaking tasks into smaller, more structured steps may reduce a sense of overwhelm and increase initiation.  Find tasks that are inherently motivating to build self-initiation   Impulsivity  (doing something without thinking it through) Have explicit, extensive and/or clear set of rules and expectations, and reviewed frequently or have posted. Collaborate when possible to have buy-in with rules.  Response delay techniques can be helpful such that you count to 5 or 10 before responding verbally or physically.    Several stop and think methods are available that teach individuals to inhibit their initial response, to consider the potential consequences of their behaviors, and to further develop a plan of approach to a situation.  Consultation with a psychologist would be helpful.   If you have an impulsive approach to tasks, then verbalize a plan of approach before starting work.  This places a short time period between impulse and action and can allow for better planning and a more strategic approach.  You could explain how you will approach a task, including goals for accuracy and time. Also, we encourage spending some time thinking about different kinds of plans to help focus attention on possible consequences or alternate strategies.   Having unstructured (impulse time) activities are also important given the significant cognitive demands placed on individuals to inhibit impulsive behavior. This can include athletic  pursuits, art, satya, or whatever is enjoyable.     Task Persistence/Vigilance:  (sticking to it) Having a written checklist of steps required to complete a task can serve as an external guide to stick to something until all steps are completed.  Pair enjoyable tasks with something that is not well liked. For instance, play enjoyable music when you are cleaning the house. Or, provide strong incentives once an entire tasks is completed.   Work with someone who has good task persistence (and, who you like) as they can be a good motivator.    Planning/Organization Frequently, individuals with executive dysfunction have organizational difficulties and, consequently, their disorganization only serves to worsen their executive dysfunction. They will need to work with someone to not only initiate organization, but also to maintain organization over time. Once they become organized, they'll likely need some sort of visual reminder at home or in the office to continue staying organized along with a set time to organize.   Information should be presented in an organized manner (e.g., outline, power point). If this is not possible, then you will need to organize that information before you get started on a task. It will be tempting to just jump and start, but take a moment and get organized first. Also, if someone is presenting you with information (e.g., do this, then that, then this), then you'll need to write that down in an organized fashion.   Have an organization system at home and stick to a routine of maintaining it. Online tools have amazing ideas for doing this. You can have a planner, electronic tool like an iPhone, files, color coordinated pen, wall calendar, files on your computer for certain courses or projects. Brainstorm what works best for you.  Given the particular difficulty managing complex, long-term assignments, students/employees with organizational difficulties often benefit from working on only  one task, or one step of a larger task, at a time.    Tasks (big or small) may need to be broken down into smaller steps in order to facilitate organization and planning.    Seek out professional help if the above strategies are not proving effective.   Divided Attention/Multi-tasking Most people have limited divided attention and research has shown that we are less effective when we are doing several things at once (e.g., checking email, reading online, & watching television). Try to methodically engage in one task at a time to limit problems with divided attention.   Set up some organizers so you can move from one separate task to another.   Have visual cues to keep you on track when you have a lot to do. This will re-orient you and remind you. For instance you may be doing something, get distracted, but then see your notepad that has your next task.          Your Healthy Brain - Strategies to help with Word Finding    Not being able to find a word when you need it is a common and very annoying problem. It is not strictly a memory issue, but more a filing and retrieval issue. You know the word or name you want, but it cannot be found in your brain file. It is like having all the files in your file cabinet emptied on the floor. Every piece of information is there but finding it can be a challenge.     One strategy that may help is working with a speech pathologist/therapist to learn techniques to decrease word finding problems. Some of those strategies/techniques include the following:  Use circumlocutions. Describe the object you're trying to name instead of naming it.  Recite you're A, B, Cs. Go through the alphabet to see if a letter triggers finding the word.  Picture it. Try to visualize the spelling or writing of the word.  Relax. The harder you try to force yourself to come up with the word, the more frustrated you get and the worse you function.   Write it down. In situations where using correct words is  "critical, such as communicating on the radio while flying, write down the key words so that they are in front of you if needed.  Use word association. For words or names you continually misfile and can't find, try to come up with a word association, something that reminds you of the word or name.  Write a script. Try scripting something important that you want to say. Either write it out or practice it beforehand, so that you get it right.  Play word games. Doing crossword puzzles and playing word finding games may help your brain become more efficient at filing and retrieving words.    Why does anxiety impact my thinking?    Your brain's number one job is to keep you alive, and our threat-response system is called the fight, flight, or freeze response.      When this gets turned on, our body automatically reacts within milliseconds -   Pupils dilate so we can have better vision of our surroundings  Skin becomes pale or flushed as the body redirects blood flow to the muscles, preparing us to run or fight   Heart rate and breathing become more rapid as our bodies try to take in as much oxygen as possible  Muscles tense and become primed for action, and may actually tremble or shake   Extra oxygen gets sent to the brain, increasing alertness and vigilance   Sight, hearing, and other senses get sharper     When we say "anxiety," what we are really describing is the subjective experience when this fight/flight/freeze system is activated. So, anxiety is a normal (and necessary!) human emotion. Without it, we probably wouldn't survive!     This system is designed to be FAST, but not necessarily ACCURATE. And that's ok! When there is a threat in your environment, it is usually more important that you respond quickly. For example, you may dodge out of the way of an oncoming car before you have time to really think about what's happening. In fact, the fight/flight/freeze response will OVERRIDE the more logical, thinking " "parts of your brain. It doesn't want you to waste time or energy wondering  why the bear is chasing you, it just wants you to run faster! That's why it feels impossible to think clearly when we are acutely stressed.     However, when this system gets dysregulated, we start to have problems.     If we are under chronic stress, the system is activated repeatedly. It becomes sensitized, turns on more easily, and becomes difficult to turn off. We have trouble sleeping because our body is on high alert all the time. Chronic stress is like a computer program running in the background, quietly draining all of our cognitive resources.    If we experience a trauma, this system gets turned up even more, and can get stuck in the "on" position. Often, people with a trauma history learn to live with this level of arousal after a while, and might not even notice it most of the time. However, when they get exposed to a new stressor - even a relatively minor one - they have less resources available to cope with it, because their  fight/flight/freeze sytem is ALREADY activated. So they are more prone to their body becoming overwhelmed, and may experience dissociative episodes, panic attacks, insomnia, stomach issues, and trouble thinking.      Effects of Anxiety on the Body          Central nervous system  Long-term anxiety and panic attacks can cause your brain to release stress hormones on a regular basis. This can increase the frequency of symptoms such as headaches, dizziness, and depression.    When you feel anxious and stressed, your brain floods your nervous system with hormones and chemicals designed to help you respond to a threat. Adrenaline and cortisol are two examples.    While helpful for the occasional high-stress event, long-term exposure to stress hormones can be more harmful to your physical health in the long run. For example, long-term exposure to cortisol can contribute to weight gain, insomnia, and difficulty " concentrating.    Cardiovascular system  Anxiety disorders can cause rapid heart rate, palpitations, and chest pain. You may also be at an increased risk of high blood pressure and heart disease. If you already have heart disease, anxiety disorders may raise the risk of coronary events.    Excretory and digestive systems  Anxiety also affects your excretory and digestive systems. You may have stomachaches, nausea, diarrhea, and other digestive issues. Loss of appetite can also occur.    There may be a connection between anxiety disorders and the development of irritable bowel syndrome (IBS) after a bowel infection. IBS can cause vomiting, diarrhea, or constipation.    Immune system  Anxiety can trigger your flight-or-fight stress response and release a flood of chemicals and hormones, like adrenaline, into your system.    In the short term, this increases your pulse and breathing rate, so your brain can get more oxygen. This prepares you to respond appropriately to an intense situation. Your immune system may even get a brief boost. With occasional stress, your body returns to normal functioning when the stress passes.    But if you repeatedly feel anxious and stressed or it lasts a long time, your body never gets the signal to return to normal functioning. This can weaken your immune system, leaving you more vulnerable to viral infections and frequent illnesses. Also, your regular vaccines may not work as well if you have anxiety.    Respiratory system  Anxiety causes rapid, shallow breathing. If you have chronic obstructive pulmonary disease (COPD), you may be at an increased risk of hospitalization from anxiety-related complications. Anxiety can also make asthma symptoms worse.    Other effects  Anxiety disorder can cause other symptoms, including:  Headaches  muscle tension  Insomnia  Depression  social isolation    If you have a trauma-related disorder, you may experience flashbacks, reliving a traumatic  experience over and over. You might get angry or startle easily, and perhaps become emotionally withdrawn. Other symptoms include nightmares, insomnia, and sadness.    [email or web address].Setgo/health/anxiety/effects-[email or web address]-body          Anxiety Coping Strategies: Try these when you're feeling anxious or stressed:  Stress management: Limit potential triggers by managing stress levels. Keep an eye on pressures and deadlines, organize daunting tasks in to-do lists, and take enough time off from professional or educational obligations.  Take a time-out. Practice yoga, listen to music, meditate, get a massage, or learn relaxation techniques. Stepping back from the problem helps clear your head.  Eat well-balanced meals. Do not skip any meals. Do keep healthful, energy-boosting snacks on hand.  Limit alcohol and caffeine, which can aggravate anxiety and trigger panic attacks.  Get enough sleep. When stressed, your body needs additional sleep and rest.  Exercise daily: Physical exertion and an active lifestyle can improve self-image and trigger the release of chemicals in the brain that stimulate positive emotions.  Welcome humor. A good laugh goes a long way.  Maintain a positive attitude. Make an effort to replace negative thoughts with positive ones.  Get involved. Volunteer or find another way to be active in your community, which creates a support network and gives you a break from everyday stress.  Learn what triggers your anxiety. Is it work, family, school, or something else you can identify? Write in a journal when youre feeling stressed or anxious, and look for a pattern.  Support network: Talk to a person who is supportive, such as a family member or friend. Avoid storing up and suppressing anxious feelings as this can worsen anxiety disorders.  Relaxation techniques: Certain measures can help reduce signs of anxiety, including deep-breathing exercises, long baths, meditation, yoga, and resting in the  dark.  Exercises to replace negative thoughts with positive ones: Write down a list of any negative thoughts, and make another list of positive thoughts to replace them. Picturing yourself successfully facing and conquering a specific fear can also provide benefits if the anxiety symptoms link to a specific stressor.  Slow breathing. When youre anxious, your breathing becomes faster and shallower. Try deliberately slowing down your breathing. Count to three as you breathe in slowly - then count to three as you breathe out slowly.  Progressive muscle relaxation. Find a quiet location. Close your eyes and slowly tense and then relax each of your muscle groups from your toes to your head. Hold the tension for three seconds and then release quickly. This can help reduce the feelings of muscle tension that often comes with anxiety.  Stay in the present moment. Anxiety can make your thoughts live in a terrible future that hasnt happened yet. Try to bring yourself back to where you are.   Practice a grounding technique: Name 5 things you can see, 4 things you can hear, 3 things you can feel, 2 things you can smell, and 1 thing you can taste.  Healthy lifestyle. Keeping active, eating well, going out into nature, spending time with family and friends, reducing stress and doing the activities you enjoy are all effective in reducing anxiety and improving your wellbeing.     Take small acts of bravery. Avoiding what makes you anxious provides some relief in the short term, but can make you more anxious in the long term. Try approaching something that makes you anxious - even in a small way. The way through anxiety is by learning that what you fear isnt likely to happen - and if it does, youll be able to cope with it.  Challenge your self-talk. How you think affects how you feel. Anxiety can make you overestimate the danger in a situation and underestimate your ability to handle it. Try to think of different interpretations  to a situation thats making you anxious, rather than jumping to the worst-case scenario. Look at the facts for and against your thought being true.  Plan worry time. Its hard to stop worrying entirely so set aside some time to indulge your worries. Even 10 minutes each evening to write them down or go over them in your head can help stop your worries from taking over at other times.  Get to know your anxiety. Keep a diary of when its at its best - and worst. Find the patterns and plan your week - or day - to proactively manage your anxiety.  Learn from others. Talking with others who also experience anxiety - or are going through something similar - can help you feel less alone.   Be kind to yourself. Remember that you are not your anxiety. You are not weak. You are not inferior. Your body is trying to keep you safe. Be gentle with it.

## 2023-05-23 ENCOUNTER — PATIENT MESSAGE (OUTPATIENT)
Dept: INTERNAL MEDICINE | Facility: CLINIC | Age: 63
End: 2023-05-23
Payer: COMMERCIAL

## 2023-05-23 DIAGNOSIS — E03.9 ACQUIRED HYPOTHYROIDISM: Primary | ICD-10-CM

## 2023-05-24 RX ORDER — LEVOTHYROXINE SODIUM 100 UG/1
100 TABLET ORAL
Qty: 90 TABLET | Refills: 3 | Status: SHIPPED | OUTPATIENT
Start: 2023-05-24 | End: 2023-10-02

## 2023-05-24 NOTE — TELEPHONE ENCOUNTER
Called patient to schedule labs, no answer. Scheduled labs and sent patient message through [facility] portal. Relayed Dr. Pinto's message.

## 2023-05-24 NOTE — TELEPHONE ENCOUNTER
I can send higher dose, please find out which pharmacy    She will need repeat labs in 3 months, does she have to get them done at [facility]?

## 2023-05-25 ENCOUNTER — PATIENT MESSAGE (OUTPATIENT)
Dept: INTERNAL MEDICINE | Facility: CLINIC | Age: 63
End: 2023-05-25
Payer: COMMERCIAL

## 2023-06-09 NOTE — PROGRESS NOTES
NEUROPSYCHOLOGICAL EVALUATION FEEDBACK    Ranjana Dominguez attended a feedback session today.  We discussed the results of the neuropsychological evaluation (31 minutes).  Handouts provided in AVS. All of her questions were answered.    1. Mild neurocognitive disorder due to multiple etiologies, without behavioral disturbance        2. Primary insomnia        3. Anxiety        4. Abnormal brain MRI            PLAN:   sleep hygiene strategies, but can also consider CBT for insomnia  Continue to follow with behavioral health providers   Continue to follow with neurology as indicated   Repeat testing 1 year       Camille Lundy PsyD  Licensed Clinical Neuropsychologist  [facility] - Department of Neurology

## 2023-08-24 ENCOUNTER — LAB VISIT (OUTPATIENT)
Dept: LAB | Facility: HOSPITAL | Age: 63
End: 2023-08-24
Attending: INTERNAL MEDICINE
Payer: COMMERCIAL

## 2023-08-24 ENCOUNTER — TELEPHONE (OUTPATIENT)
Dept: INTERNAL MEDICINE | Facility: CLINIC | Age: 63
End: 2023-08-24
Payer: COMMERCIAL

## 2023-08-24 DIAGNOSIS — Z00.00 ANNUAL PHYSICAL EXAM: Primary | ICD-10-CM

## 2023-08-24 DIAGNOSIS — E03.9 ACQUIRED HYPOTHYROIDISM: ICD-10-CM

## 2023-08-24 LAB — TSH SERPL DL<=0.005 MIU/L-ACNC: 1.25 UIU/ML (ref 0.4–4)

## 2023-08-24 PROCEDURE — 36415 COLL VENOUS BLD VENIPUNCTURE: CPT | Performed by: INTERNAL MEDICINE

## 2023-08-24 PROCEDURE — 84443 ASSAY THYROID STIM HORMONE: CPT | Performed by: INTERNAL MEDICINE

## 2023-08-24 NOTE — TELEPHONE ENCOUNTER
Thyroid labs are acceptable, continue current regimen.  Please schedule for other routine annual labs before her appointment with me, orders in, thanks

## 2023-08-31 ENCOUNTER — PATIENT MESSAGE (OUTPATIENT)
Dept: ORTHOPEDICS | Facility: CLINIC | Age: 63
End: 2023-08-31
Payer: COMMERCIAL

## 2023-08-31 ENCOUNTER — TELEPHONE (OUTPATIENT)
Dept: PAIN MEDICINE | Facility: CLINIC | Age: 63
End: 2023-08-31
Payer: COMMERCIAL

## 2023-08-31 DIAGNOSIS — M54.16 LUMBAR RADICULOPATHY: Primary | ICD-10-CM

## 2023-08-31 NOTE — TELEPHONE ENCOUNTER
----- Message from Tahira Mcmullen PA-C sent at 2023 10:26 AM CDT -----  Regarding: Order for ERASMO MONTIEL    Patient Name: ERASMO MONTIEL([id number])  Sex: Female  : 1960      PCP: NICK LINARES    Center: [facility]ENTR[facility] BILLING [facility]     Types of orders made on 2023: Procedure Request    Order Date:2023  Ordering User:TAHIRA MCMULLEN [[id number]]  Encounter Provider:Tahira Mcmullen PA-C [[id number]]  Authorizing Provider: Tahira Mcmullen PA-C [[id number]]  Supervising Provider:VERONICA PANCHAL [[id number]]  Type of Supervision:Supervision Required  Department:[facility][[id number]]    Common Order Information  Procedure -> Transforaminal Injection (Specify level and laterality) Cmt: bilat             L4-5    Order Specific Information  Order: Procedure Order to Pain Management [Cus  mychal: [id number]]  Order #:          [id number]: 1 FUTURE    Priority: Routine  Class: Clinic Performed    Future Order Information      Expires on:2024            Expected by:2023                   Associated Diagnoses      M54.16 Lumbar radiculopathy      Facility Name: -> [facility]           Priority: Routine  Class: Clinic Performed    Future Order Information      Expires o  n:2024            Expected by:2023                   Associated Diagnoses      M54.16 Lumbar radiculopathy      Procedure -> Transforaminal Injection (Specify level and laterality) Cmt:                 bilat L4-5        Facility Name: -> [facility]

## 2023-09-11 ENCOUNTER — PATIENT MESSAGE (OUTPATIENT)
Dept: ADMINISTRATIVE | Facility: HOSPITAL | Age: 63
End: 2023-09-11
Payer: COMMERCIAL

## 2023-09-12 ENCOUNTER — TELEPHONE (OUTPATIENT)
Dept: PAIN MEDICINE | Facility: CLINIC | Age: 63
End: 2023-09-12
Payer: COMMERCIAL

## 2023-09-12 ENCOUNTER — HOSPITAL ENCOUNTER (EMERGENCY)
Facility: HOSPITAL | Age: 63
Discharge: HOME | End: 2023-09-12
Attending: STUDENT IN AN ORGANIZED HEALTH CARE EDUCATION/TRAINING PROGRAM
Payer: COMMERCIAL

## 2023-09-12 VITALS
SYSTOLIC BLOOD PRESSURE: 137 MMHG | WEIGHT: 116 LBS | DIASTOLIC BLOOD PRESSURE: 70 MMHG | TEMPERATURE: 98 F | OXYGEN SATURATION: 98 % | RESPIRATION RATE: 18 BRPM | BODY MASS INDEX: 20.55 KG/M2 | HEART RATE: 68 BPM

## 2023-09-12 DIAGNOSIS — R55 SYNCOPE: ICD-10-CM

## 2023-09-12 LAB
ALBUMIN SERPL BCP-MCNC: 3.5 G/DL (ref 3.5–5.2)
ALP SERPL-CCNC: 45 U/L (ref 55–135)
ALT SERPL W/O P-5'-P-CCNC: 12 U/L (ref 10–44)
ANION GAP SERPL CALC-SCNC: 13 MMOL/L (ref 8–16)
AST SERPL-CCNC: 19 U/L (ref 10–40)
BASOPHILS # BLD AUTO: 0.1 K/UL (ref 0–0.2)
BASOPHILS NFR BLD: 2 % (ref 0–1.9)
BILIRUB SERPL-MCNC: 0.5 MG/DL (ref 0.1–1)
BUN SERPL-MCNC: 15 MG/DL (ref 8–23)
CALCIUM SERPL-MCNC: 8.9 MG/DL (ref 8.7–10.5)
CHLORIDE SERPL-SCNC: 103 MMOL/L (ref 95–110)
CO2 SERPL-SCNC: 24 MMOL/L (ref 23–29)
CREAT SERPL-MCNC: 0.7 MG/DL (ref 0.5–1.4)
DIFFERENTIAL METHOD: ABNORMAL
EOSINOPHIL # BLD AUTO: 0 K/UL (ref 0–0.5)
EOSINOPHIL NFR BLD: 0.2 % (ref 0–8)
ERYTHROCYTE [DISTWIDTH] IN BLOOD BY AUTOMATED COUNT: 11.9 % (ref 11.5–14.5)
EST. GFR  (NO RACE VARIABLE): >60 ML/MIN/1.73 M^2
GLUCOSE SERPL-MCNC: 76 MG/DL (ref 70–110)
HCT VFR BLD AUTO: 40 % (ref 37–48.5)
HCV AB SERPL QL IA: NORMAL
HGB BLD-MCNC: 13.6 G/DL (ref 12–16)
HIV 1+2 AB+HIV1 P24 AG SERPL QL IA: NORMAL
IMM GRANULOCYTES # BLD AUTO: 0.01 K/UL (ref 0–0.04)
IMM GRANULOCYTES NFR BLD AUTO: 0.2 % (ref 0–0.5)
LYMPHOCYTES # BLD AUTO: 1.5 K/UL (ref 1–4.8)
LYMPHOCYTES NFR BLD: 30.1 % (ref 18–48)
MCH RBC QN AUTO: 32.6 PG (ref 27–31)
MCHC RBC AUTO-ENTMCNC: 34 G/DL (ref 32–36)
MCV RBC AUTO: 96 FL (ref 82–98)
MONOCYTES # BLD AUTO: 0.7 K/UL (ref 0.3–1)
MONOCYTES NFR BLD: 13.2 % (ref 4–15)
NEUTROPHILS # BLD AUTO: 2.7 K/UL (ref 1.8–7.7)
NEUTROPHILS NFR BLD: 54.3 % (ref 38–73)
NRBC BLD-RTO: 0 /100 WBC
PLATELET # BLD AUTO: 244 K/UL (ref 150–450)
PMV BLD AUTO: 10.3 FL (ref 9.2–12.9)
POTASSIUM SERPL-SCNC: 4.2 MMOL/L (ref 3.5–5.1)
PROT SERPL-MCNC: 5.9 G/DL (ref 6–8.4)
RBC # BLD AUTO: 4.17 M/UL (ref 4–5.4)
SODIUM SERPL-SCNC: 140 MMOL/L (ref 136–145)
TROPONIN I SERPL DL<=0.01 NG/ML-MCNC: <0.006 NG/ML (ref 0–0.03)
TSH SERPL DL<=0.005 MIU/L-ACNC: 0.4 UIU/ML (ref 0.4–4)
WBC # BLD AUTO: 5.01 K/UL (ref 3.9–12.7)

## 2023-09-12 PROCEDURE — 85025 COMPLETE CBC W/AUTO DIFF WBC: CPT

## 2023-09-12 PROCEDURE — 99285 EMERGENCY DEPT VISIT HI MDM: CPT | Mod: 25

## 2023-09-12 PROCEDURE — 86803 HEPATITIS C AB TEST: CPT | Performed by: PHYSICIAN ASSISTANT

## 2023-09-12 PROCEDURE — 80053 COMPREHEN METABOLIC PANEL: CPT

## 2023-09-12 PROCEDURE — 93010 EKG 12-LEAD: ICD-10-PCS | Mod: ,,, | Performed by: INTERNAL MEDICINE

## 2023-09-12 PROCEDURE — 94761 N-INVAS EAR/PLS OXIMETRY MLT: CPT

## 2023-09-12 PROCEDURE — 84443 ASSAY THYROID STIM HORMONE: CPT

## 2023-09-12 PROCEDURE — 63600175 PHARM REV CODE 636 W HCPCS

## 2023-09-12 PROCEDURE — 96360 HYDRATION IV INFUSION INIT: CPT

## 2023-09-12 PROCEDURE — 87389 HIV-1 AG W/HIV-1&-2 AB AG IA: CPT | Performed by: PHYSICIAN ASSISTANT

## 2023-09-12 PROCEDURE — 93005 ELECTROCARDIOGRAM TRACING: CPT

## 2023-09-12 PROCEDURE — 93010 ELECTROCARDIOGRAM REPORT: CPT | Mod: ,,, | Performed by: INTERNAL MEDICINE

## 2023-09-12 PROCEDURE — 84484 ASSAY OF TROPONIN QUANT: CPT

## 2023-09-12 RX ORDER — METHYLPREDNISOLONE 4 MG/1
TABLET ORAL
Qty: 1 EACH | Refills: 0 | Status: SHIPPED | OUTPATIENT
Start: 2023-09-12 | End: 2023-10-02

## 2023-09-12 RX ADMIN — SODIUM CHLORIDE, POTASSIUM CHLORIDE, SODIUM LACTATE AND CALCIUM CHLORIDE 500 ML: 600; 310; 30; 20 INJECTION, SOLUTION INTRAVENOUS at 05:09

## 2023-09-12 NOTE — ED PROVIDER NOTES
Encounter Date: 9/12/2023       History     Chief Complaint   Patient presents with    Loss of Consciousness     Syncopal episode at home. Was having lower back pain prior to     Pt is a 63-year-old with past history of lumbar radiculopathy, hypothyroidism and mitral valve prolapse presents to emergency department tonight after a syncopal.  Patient states that she was in bed, felt the need to go the bathroom.  Patient tried to stand up but rolled onto her left hip which gave her significant pain.  States it was 10/10 with radiation to left knee. After standing up she felt the room go dark and then woke up on the floor. She denies urinary incontinence, denies fecal incontinence.  in the room denies any post ictal symptoms but confirms that the patient was completely out of it for about 20 seconds.  Denies chest pain, denies shortness of breath, denies abdominal pain.  States has had normal bowel movements and no urinary symptoms.  Additionally patient states that she had a couple of alcohol drinks before going bed as well as marijuana.    Denies any other symptom at this time.         Review of patient's allergies indicates:   Allergen Reactions    Decongestant tablet     Hydrocodone Itching     Past Medical History:   Diagnosis Date    Allergy     Anxiety     Colon polyp, hyperplastic: see colonoscopy 8/18 8/14/2018    Depression     Family history of colon cancer: father in his 60s 5/13/2016    Glucose intolerance (impaired glucose tolerance) 7/9/2012    Headache(784.0)     History of bariatric surgery 7/2/2018    Hypothyroidism     Lumbar radiculopathy 10/25/2022    Memory loss     Menopause syndrome     Mitral valve disorders(424.0) 5/30/2012    Obesity     Other and unspecified hyperlipidemia     Sleep apnea: per sleep study June 2015; CPAP at 11 cm recommended 6/29/2015    Special screening for malignant neoplasms, colon 6/18/2015    Thyroid disease      Past Surgical History:   Procedure Laterality Date     APPENDECTOMY  2015    BREAST CYST ASPIRATION       SECTION, CLASSIC      COLONOSCOPY N/A 2018    Procedure: COLONOSCOPY;  Surgeon: Hansel Shell MD;  Location: [facility] ENDO (4TH FLR);  Service: Endoscopy;  Laterality: N/A;    COLONOSCOPY N/A 2021    Procedure: COLONOSCOPY;  Surgeon: Martínez Amin MD;  Location: [facility] ENDO (4TH FLR);  Service: Endoscopy;  Laterality: N/A;  8/10 - LVM about cancelling procedure- sm  pt completed COVID vaccine- see Immunization record in chart-rb    FRACTURE SURGERY      HERNIA REPAIR      umbilical hernia    NOSE SURGERY      SLEEVE GASTROPLASTY      TRANSFORAMINAL EPIDURAL INJECTION OF STEROID Bilateral 2022    Procedure: TFESI (B/L) L4/5;  Surgeon: Jorge Shipman DO;  Location: [facility] OR;  Service: Pain Management;  Laterality: Bilateral;     Family History   Problem Relation Age of Onset    Sleep apnea Mother     Cancer Mother         bladder sarcoma, smoking    Sleep apnea Father     Depression Father         Bipolar    Cancer Father         colon    Colon cancer Father     Depression Sister         suicide    No Known Problems Brother     No Known Problems Daughter     Cancer Maternal Grandmother         lymphoma    Heart disease Paternal Grandmother     Melanoma Neg Hx     Skin cancer Neg Hx     Breast cancer Neg Hx     Ovarian cancer Neg Hx      Social History     Tobacco Use    Smoking status: Former     Current packs/day: 0.00     Average packs/day: 0.5 packs/day for 7.0 years (3.5 ttl pk-yrs)     Types: Cigarettes     Start date: 1975     Quit date: 1982     Years since quittin.8    Smokeless tobacco: Never   Substance Use Topics    Alcohol use: Yes     Alcohol/week: 0.0 standard drinks of alcohol     Types: 2 - 4 Glasses of wine per week     Comment: weekends    Drug use: No     Review of Systems  ROS negative except as noted in HPI     Physical Exam     Initial Vitals [23 0340]   BP Pulse Resp Temp SpO2    (!) 80/56 70 16 97.9 °F (36.6 °C) 100 %      MAP       --         Physical Exam    Constitutional: She appears well-developed and well-nourished.   HENT:   Head: Normocephalic and atraumatic.   Right Ear: External ear normal.   Left Ear: External ear normal.   Nose: Nose normal.   Mouth/Throat: Oropharynx is clear and moist.   Eyes: Conjunctivae and EOM are normal. Pupils are equal, round, and reactive to light.   Neck: Neck supple.   Normal range of motion.  Cardiovascular:  Normal rate, regular rhythm, normal heart sounds, intact distal pulses and normal pulses.           Pulmonary/Chest: Effort normal and breath sounds normal.   Abdominal: Abdomen is soft and flat. There is no abdominal tenderness.   Musculoskeletal:      Right shoulder: Normal.      Left shoulder: Normal.      Right upper arm: Normal.      Left upper arm: Normal.      Right elbow: Normal.      Left elbow: Normal.      Right forearm: Laceration present.      Left forearm: Normal.      Right wrist: Normal.      Left wrist: Normal.      Right hand: Normal.      Left hand: Normal.      Cervical back: Normal, normal range of motion and neck supple. No deformity, tenderness or bony tenderness.      Thoracic back: Normal. No deformity, tenderness or bony tenderness.      Lumbar back: Normal. No deformity, tenderness or bony tenderness.     Neurological: She is alert and oriented to person, place, and time. She has normal strength. She displays no tremor. No cranial nerve deficit or sensory deficit. She displays no seizure activity.   Psychiatric: She has a normal mood and affect. Her speech is normal and behavior is normal.           ED Course   Procedures  Labs Reviewed   CBC W/ AUTO DIFFERENTIAL - Abnormal; Notable for the following components:       Result Value    MCH 32.6 (*)     Basophil % 2.0 (*)     All other components within normal limits   COMPREHENSIVE METABOLIC PANEL - Abnormal; Notable for the following components:    Total Protein 5.9  (*)     Alkaline Phosphatase 45 (*)     All other components within normal limits   HIV 1 / 2 ANTIBODY    Narrative:     Release to patient->Immediate   HEPATITIS C ANTIBODY    Narrative:     Release to patient->Immediate   TROPONIN I   TSH     EKG Readings: (Independently Interpreted)   Normal Sinus Rhythm, no ST elevation of depression        Imaging Results              X-Ray Chest AP Portable (Final result)  Result time 09/12/23 05:42:19      Final result by Neno Ching MD (09/12/23 05:42:19)                   Impression:      There is no radiographic evidence for acute intrathoracic process.      Electronically signed by: Neno Ching  Date:    09/12/2023  Time:    05:42               Narrative:    EXAMINATION:  XR CHEST AP PORTABLE    CLINICAL HISTORY:  Syncope and collapse    TECHNIQUE:  Single frontal view of the chest was performed.    COMPARISON:  Chest radiograph May 26, 2022    FINDINGS:  Single portable chest view is submitted.  The cardiomediastinal silhouette appears stable.    There is no evidence for confluent infiltrate or consolidation, significant pleural effusion or pneumothorax.    The osseous structures appear intact with chronic change noted.                                       Medications   lactated ringers bolus 500 mL (500 mLs Intravenous New Bag 9/12/23 0530)     Medical Decision Making  Pt is a 63-year-old with past history of lumbar radiculopathy, hypothyroidism and mitral valve prolapse presents to emergency department tonight after a syncopal.    Pt returned to normal baseline very quickly after incident. LOC reportedly 15-20 seconds per . Doubt TIA or CVA given lack of neurological deficit currently or at time of syncope. Doubt seizure given lack of post-ictal symptoms or seizure like activity during event. Low suspicion for ACS, EKG WNL and no symptoms at this time. Pt without history of arhythmia. Concern for vagal episode given event preceded by extreme  positional pain.     Work up to include basic labs to screen for anemia, infection or other electrolyte abnormalities. Will provide IVF given hypotension likely related to vasovagal as it resolved without any intervention    Workup described as below in ED course.    Patient stable for discharge at this time.  Patient was able to tolerate oral intake and ambulate prior to discharge.  Patient no longer had any lightheadedness with ambulation.  Patient had no urinary symptoms to suggest UTI or stone that would lead to her hip pain.  Suspect that all her pain that happened prior to her syncope was secondary to her lumbar radiculopathy.    Patient and family agreed with plan for discharge at this time.  All questions were asked and answered.  Denied any other symptoms at this time.    Amount and/or Complexity of Data Reviewed  Labs: ordered. Decision-making details documented in ED Course.  Radiology: ordered and independent interpretation performed. Decision-making details documented in ED Course.  ECG/medicine tests: ordered and independent interpretation performed. Decision-making details documented in ED Course.               ED Course as of 09/12/23 0702   Tue Sep 12, 2023   0531 X-Ray Chest AP Portable  Clear air fields, no pleural effusion or edema.  No cardiomegaly.  [TK]   0531 EKG 12-lead  Sinus rhythm no ST elevation depressions no arrhythmias [TK]   0550 CBC auto differential(!)  Unremarkable, no anemia or thrombocytopenia [TK]   0551 Comprehensive metabolic panel(!)  Unremarkable, no VERO, no hyponatremia no acidosis [TK]      ED Course User Index  [TK] Rowan Santana DO                    Clinical Impression:   Final diagnoses:  [R55] Syncope        ED Disposition Condition    Discharge Stable          ED Prescriptions    None       Follow-up Information       Follow up With Specialties Details Why Contact Info    Malena Pinto MD Internal Medicine In 1 week  [address]  [facility]  [id number]  [phone]               Rowan Santana, DO  Resident  09/12/23 0702

## 2023-09-12 NOTE — ED TRIAGE NOTES
"Ranjana Dominguez, a 63 y.o. female presents to the ED w/ complaint of syncopal episode. Pt reports left lower back pain that radiates to left flank. Pt reports standing up and getting sharp pain, pt became dizzy and passed out. Per  pt was "out of it for a few minutes. Pt has abrasion to right forearm, wrapped with gauze and coband by EMS. Denies CP, SOB, N/V/D, fever.     Triage note:  Chief Complaint   Patient presents with    Loss of Consciousness     Syncopal episode at home. Was having lower back pain prior to     Review of patient's allergies indicates:   Allergen Reactions    Decongestant tablet     Hydrocodone Itching     Past Medical History:   Diagnosis Date    Allergy     Anxiety     Colon polyp, hyperplastic: see colonoscopy 8/18 8/14/2018    Depression     Family history of colon cancer: father in his 60s 5/13/2016    Glucose intolerance (impaired glucose tolerance) 7/9/2012    Headache(784.0)     History of bariatric surgery 7/2/2018    Hypothyroidism     Lumbar radiculopathy 10/25/2022    Memory loss     Menopause syndrome     Mitral valve disorders(424.0) 5/30/2012    Obesity     Other and unspecified hyperlipidemia     Sleep apnea: per sleep study June 2015; CPAP at 11 cm recommended 6/29/2015    Special screening for malignant neoplasms, colon 6/18/2015    Thyroid disease        "

## 2023-09-12 NOTE — DISCHARGE INSTRUCTIONS
We evaluated you for syncope. We didn't find any positive findings on labs and you story in addition to physical exam are reassuring that you will start to feel better soon.     You labs are result are as follows:    Labs Reviewed   CBC W/ AUTO DIFFERENTIAL - Abnormal; Notable for the following components:       Result Value    MCH 32.6 (*)     Basophil % 2.0 (*)     All other components within normal limits   COMPREHENSIVE METABOLIC PANEL - Abnormal; Notable for the following components:    Total Protein 5.9 (*)     Alkaline Phosphatase 45 (*)     All other components within normal limits   HIV 1 / 2 ANTIBODY    Narrative:     Release to patient->Immediate   HEPATITIS C ANTIBODY    Narrative:     Release to patient->Immediate   TROPONIN I   TSH   URINALYSIS, REFLEX TO URINE CULTURE      X-Ray Chest AP Portable   Final Result      There is no radiographic evidence for acute intrathoracic process.         Electronically signed by: Neno Ching   Date:    09/12/2023   Time:    05:42

## 2023-09-14 ENCOUNTER — PATIENT MESSAGE (OUTPATIENT)
Dept: INTERNAL MEDICINE | Facility: CLINIC | Age: 63
End: 2023-09-14
Payer: COMMERCIAL

## 2023-09-14 ENCOUNTER — OFFICE VISIT (OUTPATIENT)
Dept: URGENT CARE | Facility: CLINIC | Age: 63
End: 2023-09-14
Payer: COMMERCIAL

## 2023-09-14 VITALS
TEMPERATURE: 98 F | OXYGEN SATURATION: 98 % | HEART RATE: 71 BPM | BODY MASS INDEX: 20.55 KG/M2 | HEIGHT: 63 IN | SYSTOLIC BLOOD PRESSURE: 135 MMHG | RESPIRATION RATE: 14 BRPM | WEIGHT: 116 LBS | DIASTOLIC BLOOD PRESSURE: 83 MMHG

## 2023-09-14 DIAGNOSIS — L03.90 WOUND CELLULITIS: Primary | ICD-10-CM

## 2023-09-14 PROCEDURE — 99203 PR OFFICE/OUTPT VISIT, NEW, LEVL III, 30-44 MIN: ICD-10-PCS | Mod: S$GLB,,, | Performed by: FAMILY MEDICINE

## 2023-09-14 PROCEDURE — 99203 OFFICE O/P NEW LOW 30 MIN: CPT | Mod: S$GLB,,, | Performed by: FAMILY MEDICINE

## 2023-09-14 RX ORDER — MUPIROCIN 20 MG/G
OINTMENT TOPICAL 3 TIMES DAILY
Qty: 30 G | Refills: 0 | Status: SHIPPED | OUTPATIENT
Start: 2023-09-14

## 2023-09-14 NOTE — TELEPHONE ENCOUNTER
I ordered all the bariatric labs since she had the gastric sleeve surgery    She needs everything except for the CBC and CMP thanks

## 2023-09-14 NOTE — PROGRESS NOTES
Subjective:      Patient ID: Ranjana Dominguez is a 63 y.o. female.    Vitals:  vitals were not taken for this visit.     Chief Complaint: Abrasion    62 y/o female presents for abrasion sustained 2 days ago to the R upper extremity. Notes a syncopal episode at night after jumping out of bed due to experiencing significant L sided low back pain radiating down the L leg. Seen in ED on 9/12/23. Seeking guidance on how to care for abrasion. Taking Medrol Dosepak for low back pain. CIELO scheduled for 20th of this month, as well.     Other  This is a new problem. The current episode started in the past 7 days (onset 2 days ago). The problem occurs constantly. The problem has been gradually worsening. Pertinent negatives include no abdominal pain, anorexia, arthralgias, change in bowel habit, chest pain, chills, congestion, coughing, diaphoresis, fatigue, fever, headaches, joint swelling, myalgias, nausea, neck pain, numbness, rash, sore throat, swollen glands, urinary symptoms, vertigo, visual change, vomiting or weakness. Treatments tried: medroldosepak, petroleum dressing. The treatment provided mild relief.       Constitution: Negative for chills, sweating, fatigue and fever.   HENT:  Negative for congestion and sore throat.    Neck: Negative for neck pain.   Cardiovascular:  Negative for chest pain.   Respiratory:  Negative for cough.    Gastrointestinal:  Negative for abdominal pain, nausea and vomiting.   Musculoskeletal:  Negative for joint pain, joint swelling and muscle ache.   Skin:  Negative for rash and erythema.   Neurological:  Negative for history of vertigo, headaches and numbness.    Objective:     Physical Exam   Constitutional: She is oriented to person, place, and time. No distress. normal  HENT:   Head: Normocephalic and atraumatic.   Nose: No rhinorrhea or congestion.   Mouth/Throat: Mucous membranes are moist. No posterior oropharyngeal erythema. Oropharynx is clear.   Eyes:  Conjunctivae are normal. Pupils are equal, round, and reactive to light. Extraocular movement intact   Neck: Neck supple.   Pulmonary/Chest: Effort normal. No stridor. No respiratory distress.   Abdominal: Normal appearance.   Musculoskeletal: Normal range of motion.         General: Normal range of motion.      Comments: Good granulation noted on abrasions   Neurological: no focal deficit. She is alert, oriented to person, place, and time and at baseline.   Skin: Skin is warm and dry. Abrasions - upper ext.:  forearm (right)  lesion No erythema      jaundice  Psychiatric: Her behavior is normal. Mood, judgment and thought content normal.   Nursing note and vitals reviewed.      Assessment:     Plan:   1. Wound care /abrasion  - mupirocin (BACTROBAN) 2 % ointment; Apply topically 3 (three) times daily.  Dispense: 30 g; Refill: 0   2. F/u with pcp

## 2023-09-19 NOTE — PRE-PROCEDURE INSTRUCTIONS
9/19/23 @ 1529: Called pt with updated arrival time of 12:00pm. Verbalized understanding.     The following was discussed with pt via phone and sent to pt portal. Pt verbalized understanding.                           On the day of your pain procedure, please report to [facility] located at [address] [place] [address].  Please park in the lot in front of the building and enter at the main entrance. Proceed to the second floor for registration.    Arrival time: 12:30pm    You may not drive home after your procedure. You may not leave alone in an uber, taxi, etc.  You must be discharged to a responsible adult. Please remain under adult supervision for 24 hours.   If possible, please have your visitor &/or ride home stay during your visit.   The surgeon should speak with your visitors after your procedure.  All visitors must be 18 years of age or older. Please limit your visitors to max of 2 people.    Your fasting instructions are as follow:  IV sedation. You should not eat for 8 hours and can only drink clear liquids (water or black coffee without cream/sugar) up until 2 hours before your scheduled time.  You CANNOT drive yourself and must have a .      Shower the night before and the morning of your procedure with antibacterial soap.   Please do not use antibacterial soap to wash your face. Use your regular face soap.  Do not apply any products to your skin nor your hair after you shower the morning of your procedure.  Products include oils, powders, lotion, deodorant, make-up, or sunscreen.    Wear loose, comfortable clothing.  No jewelry. No contacts. Bring glasses if necessary.  If you have dentures, please bring a case.  Please leave all valuables at home.    If you start to feel sick (fever, chills, coughing, etc) or start on any antibiotics, please contact your doctor's office at [phone] to reschedule.     Thanks,  EMEKA Bustos  Pre-Admit Dept [facility]  [phone]

## 2023-09-20 ENCOUNTER — HOSPITAL ENCOUNTER (OUTPATIENT)
Facility: HOSPITAL | Age: 63
Discharge: HOME | End: 2023-09-20
Attending: ANESTHESIOLOGY | Admitting: ANESTHESIOLOGY
Payer: COMMERCIAL

## 2023-09-20 VITALS
TEMPERATURE: 98 F | HEIGHT: 63 IN | HEART RATE: 66 BPM | RESPIRATION RATE: 16 BRPM | WEIGHT: 111 LBS | OXYGEN SATURATION: 96 % | SYSTOLIC BLOOD PRESSURE: 148 MMHG | DIASTOLIC BLOOD PRESSURE: 90 MMHG | BODY MASS INDEX: 19.67 KG/M2

## 2023-09-20 DIAGNOSIS — M54.16 LUMBAR RADICULOPATHY: Primary | ICD-10-CM

## 2023-09-20 DIAGNOSIS — M51.36 DDD (DEGENERATIVE DISC DISEASE), LUMBAR: ICD-10-CM

## 2023-09-20 DIAGNOSIS — G89.29 CHRONIC PAIN: ICD-10-CM

## 2023-09-20 PROCEDURE — 64483 PR EPIDURAL INJ, ANES/STEROID, TRANSFORAMINAL, LUMB/SACR, SNGL LEVL: ICD-10-PCS | Mod: 50,,, | Performed by: ANESTHESIOLOGY

## 2023-09-20 PROCEDURE — 64483 NJX AA&/STRD TFRM EPI L/S 1: CPT | Mod: 50,,, | Performed by: ANESTHESIOLOGY

## 2023-09-20 PROCEDURE — 25500020 PHARM REV CODE 255: Performed by: ANESTHESIOLOGY

## 2023-09-20 PROCEDURE — 63600175 PHARM REV CODE 636 W HCPCS: Performed by: ANESTHESIOLOGY

## 2023-09-20 PROCEDURE — 63600175 PHARM REV CODE 636 W HCPCS: Performed by: STUDENT IN AN ORGANIZED HEALTH CARE EDUCATION/TRAINING PROGRAM

## 2023-09-20 PROCEDURE — 64483 NJX AA&/STRD TFRM EPI L/S 1: CPT | Mod: 50 | Performed by: ANESTHESIOLOGY

## 2023-09-20 PROCEDURE — 25000003 PHARM REV CODE 250: Performed by: ANESTHESIOLOGY

## 2023-09-20 RX ORDER — LIDOCAINE HYDROCHLORIDE 20 MG/ML
INJECTION, SOLUTION EPIDURAL; INFILTRATION; INTRACAUDAL; PERINEURAL
Status: DISCONTINUED | OUTPATIENT
Start: 2023-09-20 | End: 2023-09-20 | Stop reason: HOSPADM

## 2023-09-20 RX ORDER — MIDAZOLAM HYDROCHLORIDE 1 MG/ML
1 INJECTION INTRAMUSCULAR; INTRAVENOUS
Status: DISCONTINUED | OUTPATIENT
Start: 2023-09-20 | End: 2023-09-20 | Stop reason: HOSPADM

## 2023-09-20 RX ORDER — LIDOCAINE HYDROCHLORIDE 10 MG/ML
INJECTION, SOLUTION EPIDURAL; INFILTRATION; INTRACAUDAL; PERINEURAL
Status: DISCONTINUED | OUTPATIENT
Start: 2023-09-20 | End: 2023-09-20 | Stop reason: HOSPADM

## 2023-09-20 RX ORDER — DEXAMETHASONE SODIUM PHOSPHATE 10 MG/ML
INJECTION INTRAMUSCULAR; INTRAVENOUS
Status: DISCONTINUED | OUTPATIENT
Start: 2023-09-20 | End: 2023-09-20 | Stop reason: HOSPADM

## 2023-09-20 RX ORDER — SODIUM CHLORIDE 9 MG/ML
INJECTION, SOLUTION INTRAVENOUS CONTINUOUS
Status: DISCONTINUED | OUTPATIENT
Start: 2023-09-20 | End: 2023-09-20 | Stop reason: HOSPADM

## 2023-09-20 RX ORDER — FENTANYL CITRATE 50 UG/ML
25 INJECTION, SOLUTION INTRAMUSCULAR; INTRAVENOUS
Status: DISCONTINUED | OUTPATIENT
Start: 2023-09-20 | End: 2023-09-20 | Stop reason: HOSPADM

## 2023-09-20 NOTE — DISCHARGE INSTRUCTIONS
Home Care Instructions Pain Management:    1.  DIET:    You may resume your normal diet today.    2.  BATHING:    You may shower with luke warm water.    3.  DRESSING:    You may remove your bandage today.    4.  ACTIVITY LEVEL:      You may resume your normal activities 24 hours after your procedure.    5.  MEDICATIONS:    You may resume your normal medications today.    6.  SPECIAL INSTRUCTIONS:    No heat to the injection site for 24 hours including bath or shower, heating pad, moist heat or hot tubs.    Use an ice pack to the injection site for any pain or discomfort.  Apply ice packs for 20 minute intervals as needed.    If you have received any sedatives by mouth today, you can not drive for 12 hours.    If you have received sedation through an IV, you can not drive for 24 hours.    PLEASE CALL YOUR DOCTOR FOR THE FOLLOWIN.  Redness or swelling around the injection site.  2.  Fever of 101 degrees.  3.  Drainage (pus) from the injection site.  4.  For any continuous bleeding (some dried blood over the incision is normal.)    FOR EMERGENCIES:    If any unusual problems or difficulties occur during clinic hours, call the clinic or dial 911.    Follow up with with your physician in 2-3 weeks.

## 2023-09-20 NOTE — PLAN OF CARE
VSS. Consent and site mandy complete. All patient concerns addressed. Patient's spouse available for ride home. Patient belongings placed behind stretcher. Pre procedure complete. Call light in reach of patient. Side rails up x2.

## 2023-09-20 NOTE — DISCHARGE SUMMARY
Discharge Note  Short Stay      SUMMARY     Admit Date: 9/20/2023    Attending Physician: Woo Thomas MD      Discharge Physician: Casper Aguero      Discharge Date: 9/20/2023 1:38 PM    Procedure(s) (LRB):  B/L L4-5 TFESI (Bilateral)    Final Diagnosis: Lumbar radiculopathy [M54.16]    Disposition: Home or self care    Patient Instructions:   Current Discharge Medication List        CONTINUE these medications which have NOT CHANGED    Details   cholecalciferol, vitamin D3, (VITAMIN D3) 25 mcg (1,000 unit) capsule Take 2 capsules (2,000 Units total) by mouth once daily.  Qty: 60 capsule, Refills: 12      clonazePAM (KLONOPIN) 0.5 MG tablet Take 0.5 mg by mouth once daily.      cyanocobalamin (VITAMIN B-12) 1000 MCG tablet Take 1 tablet (1,000 mcg total) by mouth once daily.  Qty: 30 tablet, Refills: 12      levothyroxine (SYNTHROID) 100 MCG tablet Take 1 tablet (100 mcg total) by mouth before breakfast.  Qty: 90 tablet, Refills: 3      methylPREDNISolone (MEDROL DOSEPACK) 4 mg tablet use as directed  Qty: 1 each, Refills: 0      mupirocin (BACTROBAN) 2 % ointment Apply topically 3 (three) times daily.  Qty: 30 g, Refills: 0    Associated Diagnoses: Wound cellulitis      naproxen (NAPROSYN) 500 MG tablet Take 500 mg by mouth 2 (two) times daily.      venlafaxine (EFFEXOR) 50 MG Tab TK 1 T PO  TID  Refills: 2      fluticasone (FLONASE) 50 mcg/actuation nasal spray SHAKE LIQUID AND USE 2 SPRAYS(100 MCG) IN EACH NOSTRIL EVERY DAY  Qty: 48 mL, Refills: 0    Comments: **Patient requests 90 days supply**  Associated Diagnoses: Sinus congestion      gabapentin (NEURONTIN) 100 MG capsule TAKE 1 CAPSULE(100 MG) BY MOUTH THREE TIMES DAILY  Qty: 90 capsule, Refills: 0      lorazepam (ATIVAN) 0.5 MG tablet Refills: 2      sars-cov-2, covid-19, (MODERNA COVID-19) 50 mcg/0.25 ml injection (BOOSTER) Inject into the muscle.  Qty: 0.25 mL, Refills: 0                 Discharge Diagnosis: Lumbar radiculopathy [M54.16]  Condition  on Discharge: Stable with no complications to procedure   Diet on Discharge: Same as before.  Activity: as per instruction sheet.  Discharge to: Home with a responsible adult.  Follow up: 2-4 weeks       Please call my office or pager at [phone] if experienced any weakness or loss of sensation, fever > 101.5, pain uncontrolled with oral medications, persistent nausea/vomiting/or diarrhea, redness or drainage from the incisions, or any other worrisome concerns. If physician on call was not reached or could not communicate with our office for any reason please go to the nearest emergency department

## 2023-09-20 NOTE — OP NOTE
Lumbar Transforaminal Epidural Steroid Injection under Fluoroscopic Guidance    The procedure, risks, benefits, and options were discussed with the patient. There are no contraindications to the procedure. The patent expressed understanding and agreed to the procedure. Informed written consent was obtained prior to the start of the procedure and can be found in the patient's chart.    PATIENT NAME: Ms. Ranjana Dominguez   MRN: [id number]     DATE OF PROCEDURE: 09/20/2023    PROCEDURE:  Bilateral  L4/5 Lumbar Transforaminal Epidural Steroid Injection under Fluoroscopic Guidance    PRE-OP DIAGNOSIS: Lumbar radiculopathy [M54.16] Lumbar radiculopathy [M54.16]    POST-OP DIAGNOSIS: Same    PHYSICIAN: Woo Thomas MD    ASSISTANTS: Casper Aguero M.D. ([facility] Fellow)     MEDICATIONS INJECTED: Preservative-free Decadron 10mg with 5cc of Lidocaine 1% MPF     LOCAL ANESTHETIC INJECTED: Xylocaine 2%     SEDATION: Versed 2mg and Fentanyl 100mcg                                                                                                                                                                                     Conscious sedation ordered by M.D. Patient re-evaluation prior to administration of conscious sedation. No changes noted in patient's status from initial evaluation. The patient's vital signs were monitored by RN and patient remained hemodynamically stable throughout the procedure.    Event Time In   Sedation Start 1342   Sedation End 1348       ESTIMATED BLOOD LOSS: None    COMPLICATIONS: None    TECHNIQUE: Time-out was performed to identify the patient and procedure to be performed. With the patient laying in a prone position, the surgical area was prepped and draped in the usual sterile fashion using ChloraPrep and a fenestrated drape.The levels were determined under fluoroscopy guidance. Skin anesthesia was achieved by injecting Lidocaine 2% over the injection sites. The transforaminal spaces were  then approached with a 22 gauge, 3.5 inch spinal quinke needle that was introduced under fluoroscopic guidance in the AP and Lateral views. Once the needle tip was in the area of the transforaminal space, and there was no blood, CSF or paraesthesias, contrast dye Omnipaque (300mg/mL) was injected to confirm placement and there was no vascular runoff. Fluoroscopic imaging in the AP and lateral views revealed a clear outline of the spinal nerve with proximal spread of agent through the neural foramen into the epidural space. 3 mL of the medication mixture listed above was injected slowly at each site. Displacement of the radio opaque contrast after injection of the medication confirmed that the medication went into the area of the transforaminal spaces. The needles were removed and bleeding was nil. A sterile dressing was applied. No specimens collected. The patient tolerated the procedure well.     The patient was monitored after the procedure in the recovery area. They were given post-procedure and discharge instructions to follow at home. The patient was discharged in a stable condition.    Casper Aguero MD    I reviewed and edited the fellow's note. I conducted my own interview and physical examination. I agree with the findings. I was present and supervising all critical portions of the procedure.    Woo Thomas MD

## 2023-09-20 NOTE — INTERVAL H&P NOTE
The patient has been examined and the H&P has been reviewed:    I concur with the findings and no changes have occurred since H&P was written.    Anesthesia/Surgery risks, benefits and alternative options discussed and understood by patient/family.          There are no hospital problems to display for this patient.      HPI  Patient presenting for Procedure(s) (LRB):  B/L L4-5 TFESI (Bilateral)     Patient on Anti-coagulation No    No health changes since previous encounter    Past Medical History:   Diagnosis Date    Allergy     Anxiety     Colon polyp, hyperplastic: see colonoscopy 2018    Depression     Family history of colon cancer: father in his 60s 2016    Glucose intolerance (impaired glucose tolerance) 2012    Headache(784.0)     History of bariatric surgery 2018    Hypothyroidism     Lumbar radiculopathy 10/25/2022    Memory loss     Menopause syndrome     Mitral valve disorders(424.0) 2012    Obesity     Other and unspecified hyperlipidemia     Sleep apnea: per sleep study 2015; CPAP at 11 cm recommended 2015    Special screening for malignant neoplasms, colon 2015    Thyroid disease      Past Surgical History:   Procedure Laterality Date    APPENDECTOMY  2015    BREAST CYST ASPIRATION       SECTION, CLASSIC      COLONOSCOPY N/A 2018    Procedure: COLONOSCOPY;  Surgeon: Hansel Shell MD;  Location: [facility] ([address] [place] [address]);  Service: Endoscopy;  Laterality: N/A;    COLONOSCOPY N/A 2021    Procedure: COLONOSCOPY;  Surgeon: Martínez Amin MD;  Location: [facility] ([address] [place] [address]);  Service: Endoscopy;  Laterality: N/A;  8/10 - LVM about cancelling procedure- sm  pt completed COVID vaccine- see Immunization record in chart-rb    FRACTURE SURGERY      HERNIA REPAIR      umbilical hernia    NOSE SURGERY      SLEEVE GASTROPLASTY      TRANSFORAMINAL EPIDURAL INJECTION OF STEROID Bilateral 2022    Procedure: TFESI (B/L) L4/5;  Surgeon: Jorge  DO Ramonita;  Location: [facility] OR;  Service: Pain Management;  Laterality: Bilateral;     Review of patient's allergies indicates:   Allergen Reactions    Decongestant tablet     Hydrocodone Itching      No current facility-administered medications for this encounter.     Facility-Administered Medications Ordered in Other Encounters   Medication    dextrose 5 % and 0.45 % NaCl infusion    dextrose 5 % and 0.45 % NaCl infusion    sodium chloride 0.9% flush 3 mL       PMHx, PSHx, Allergies, Medications reviewed in epic    ROS negative except pain complaints in HPI    OBJECTIVE:    LMP 07/09/2004     PHYSICAL EXAMINATION:    GENERAL: Well appearing, in no acute distress, alert and oriented x3.  PSYCH:  Mood and affect appropriate.  SKIN: Skin color, texture, turgor normal, no rashes or lesions which will impact the procedure.  CV: RRR with palpation of the radial artery.  PULM: No evidence of respiratory difficulty, symmetric chest rise. Clear to auscultation.  NEURO: Cranial nerves grossly intact.    Plan:    Proceed with procedure as planned Procedure(s) (LRB):  B/L L4-5 TFESI (Bilateral)    Casper Aguero  09/20/2023

## 2023-09-26 ENCOUNTER — PATIENT MESSAGE (OUTPATIENT)
Dept: INTERNAL MEDICINE | Facility: CLINIC | Age: 63
End: 2023-09-26
Payer: COMMERCIAL

## 2023-09-26 ENCOUNTER — LAB VISIT (OUTPATIENT)
Dept: LAB | Facility: HOSPITAL | Age: 63
End: 2023-09-26
Attending: INTERNAL MEDICINE
Payer: COMMERCIAL

## 2023-09-26 DIAGNOSIS — E83.19 IRON EXCESS: Primary | ICD-10-CM

## 2023-09-26 DIAGNOSIS — Z00.00 ANNUAL PHYSICAL EXAM: ICD-10-CM

## 2023-09-26 LAB
25(OH)D3+25(OH)D2 SERPL-MCNC: 48 NG/ML (ref 30–96)
CHOLEST SERPL-MCNC: 192 MG/DL (ref 120–199)
CHOLEST/HDLC SERPL: 2.9 {RATIO} (ref 2–5)
ESTIMATED AVG GLUCOSE: 91 MG/DL (ref 68–131)
FERRITIN SERPL-MCNC: 154 NG/ML (ref 20–300)
HBA1C MFR BLD: 4.8 % (ref 4–5.6)
HDLC SERPL-MCNC: 66 MG/DL (ref 40–75)
HDLC SERPL: 34.4 % (ref 20–50)
IRON SERPL-MCNC: 158 UG/DL (ref 30–160)
LDLC SERPL CALC-MCNC: 98.8 MG/DL (ref 63–159)
MAGNESIUM SERPL-MCNC: 1.9 MG/DL (ref 1.6–2.6)
NONHDLC SERPL-MCNC: 126 MG/DL
SATURATED IRON: 54 % (ref 20–50)
TOTAL IRON BINDING CAPACITY: 293 UG/DL (ref 250–450)
TRANSFERRIN SERPL-MCNC: 198 MG/DL (ref 200–375)
TRIGL SERPL-MCNC: 136 MG/DL (ref 30–150)
VIT B12 SERPL-MCNC: 998 PG/ML (ref 210–950)

## 2023-09-26 PROCEDURE — 84630 ASSAY OF ZINC: CPT | Performed by: INTERNAL MEDICINE

## 2023-09-26 PROCEDURE — 83735 ASSAY OF MAGNESIUM: CPT | Performed by: INTERNAL MEDICINE

## 2023-09-26 PROCEDURE — 83036 HEMOGLOBIN GLYCOSYLATED A1C: CPT | Performed by: INTERNAL MEDICINE

## 2023-09-26 PROCEDURE — 82607 VITAMIN B-12: CPT | Performed by: INTERNAL MEDICINE

## 2023-09-26 PROCEDURE — 82728 ASSAY OF FERRITIN: CPT | Performed by: INTERNAL MEDICINE

## 2023-09-26 PROCEDURE — 80061 LIPID PANEL: CPT | Performed by: INTERNAL MEDICINE

## 2023-09-26 PROCEDURE — 82525 ASSAY OF COPPER: CPT | Performed by: INTERNAL MEDICINE

## 2023-09-26 PROCEDURE — 84425 ASSAY OF VITAMIN B-1: CPT | Performed by: INTERNAL MEDICINE

## 2023-09-26 PROCEDURE — 83540 ASSAY OF IRON: CPT | Performed by: INTERNAL MEDICINE

## 2023-09-26 PROCEDURE — 84466 ASSAY OF TRANSFERRIN: CPT | Performed by: INTERNAL MEDICINE

## 2023-09-26 PROCEDURE — 84207 ASSAY OF VITAMIN B-6: CPT | Performed by: INTERNAL MEDICINE

## 2023-09-26 PROCEDURE — 82306 VITAMIN D 25 HYDROXY: CPT | Performed by: INTERNAL MEDICINE

## 2023-09-27 NOTE — TELEPHONE ENCOUNTER
Please contact her to schedule additional labs before her appointment if possible, orders in thank you

## 2023-09-28 LAB
COPPER SERPL-MCNC: 616 UG/L (ref 810–1990)
ZINC SERPL-MCNC: 94 UG/DL (ref 60–130)

## 2023-09-29 LAB
PYRIDOXAL SERPL-MCNC: 7 UG/L (ref 5–50)
VIT B1 BLD-MCNC: 83 UG/L (ref 38–122)

## 2023-09-30 ENCOUNTER — LAB VISIT (OUTPATIENT)
Dept: LAB | Facility: HOSPITAL | Age: 63
End: 2023-09-30
Attending: INTERNAL MEDICINE
Payer: COMMERCIAL

## 2023-09-30 DIAGNOSIS — E83.19 IRON EXCESS: ICD-10-CM

## 2023-09-30 PROCEDURE — 36415 COLL VENOUS BLD VENIPUNCTURE: CPT | Performed by: INTERNAL MEDICINE

## 2023-09-30 PROCEDURE — 81256 HFE GENE: CPT | Performed by: INTERNAL MEDICINE

## 2023-10-02 ENCOUNTER — OFFICE VISIT (OUTPATIENT)
Dept: INTERNAL MEDICINE | Facility: CLINIC | Age: 63
End: 2023-10-02
Payer: COMMERCIAL

## 2023-10-02 VITALS
HEIGHT: 63 IN | DIASTOLIC BLOOD PRESSURE: 76 MMHG | OXYGEN SATURATION: 97 % | SYSTOLIC BLOOD PRESSURE: 130 MMHG | WEIGHT: 117.31 LBS | BODY MASS INDEX: 20.79 KG/M2 | HEART RATE: 75 BPM

## 2023-10-02 DIAGNOSIS — M81.0 OSTEOPOROSIS WITHOUT CURRENT PATHOLOGICAL FRACTURE, UNSPECIFIED OSTEOPOROSIS TYPE: ICD-10-CM

## 2023-10-02 DIAGNOSIS — Z98.84 HISTORY OF BARIATRIC SURGERY: ICD-10-CM

## 2023-10-02 DIAGNOSIS — E83.19 IRON EXCESS: ICD-10-CM

## 2023-10-02 DIAGNOSIS — Z00.00 ANNUAL PHYSICAL EXAM: Primary | ICD-10-CM

## 2023-10-02 DIAGNOSIS — F41.9 ANXIETY: ICD-10-CM

## 2023-10-02 DIAGNOSIS — E03.9 ACQUIRED HYPOTHYROIDISM: ICD-10-CM

## 2023-10-02 DIAGNOSIS — E61.0 COPPER DEFICIENCY: ICD-10-CM

## 2023-10-02 DIAGNOSIS — K63.5 HYPERPLASTIC COLONIC POLYP, UNSPECIFIED PART OF COLON: ICD-10-CM

## 2023-10-02 DIAGNOSIS — M54.16 LUMBAR RADICULOPATHY: ICD-10-CM

## 2023-10-02 DIAGNOSIS — E78.2 MIXED HYPERLIPIDEMIA: ICD-10-CM

## 2023-10-02 PROBLEM — R53.1 WEAKNESS: Status: RESOLVED | Noted: 2022-11-16 | Resolved: 2023-10-02

## 2023-10-02 PROBLEM — Z74.09 DECREASED MOBILITY AND ENDURANCE: Status: RESOLVED | Noted: 2022-11-16 | Resolved: 2023-10-02

## 2023-10-02 PROCEDURE — 99396 PR PREVENTIVE VISIT,EST,40-64: ICD-10-PCS | Mod: 25,S$GLB,, | Performed by: INTERNAL MEDICINE

## 2023-10-02 PROCEDURE — 90686 FLU VACCINE (QUAD) GREATER THAN OR EQUAL TO 3YO PRESERVATIVE FREE IM: ICD-10-PCS | Mod: S$GLB,,, | Performed by: INTERNAL MEDICINE

## 2023-10-02 PROCEDURE — 99999 PR PBB SHADOW E&M-EST. PATIENT-LVL IV: CPT | Mod: PBBFAC,,, | Performed by: INTERNAL MEDICINE

## 2023-10-02 PROCEDURE — 99999 PR PBB SHADOW E&M-EST. PATIENT-LVL IV: ICD-10-PCS | Mod: PBBFAC,,, | Performed by: INTERNAL MEDICINE

## 2023-10-02 PROCEDURE — 90471 FLU VACCINE (QUAD) GREATER THAN OR EQUAL TO 3YO PRESERVATIVE FREE IM: ICD-10-PCS | Mod: S$GLB,,, | Performed by: INTERNAL MEDICINE

## 2023-10-02 PROCEDURE — 3008F BODY MASS INDEX DOCD: CPT | Mod: CPTII,S$GLB,, | Performed by: INTERNAL MEDICINE

## 2023-10-02 PROCEDURE — 90471 IMMUNIZATION ADMIN: CPT | Mod: S$GLB,,, | Performed by: INTERNAL MEDICINE

## 2023-10-02 PROCEDURE — 1159F PR MEDICATION LIST DOCUMENTED IN MEDICAL RECORD: ICD-10-PCS | Mod: CPTII,S$GLB,, | Performed by: INTERNAL MEDICINE

## 2023-10-02 PROCEDURE — 1159F MED LIST DOCD IN RCRD: CPT | Mod: CPTII,S$GLB,, | Performed by: INTERNAL MEDICINE

## 2023-10-02 PROCEDURE — 3008F PR BODY MASS INDEX (BMI) DOCUMENTED: ICD-10-PCS | Mod: CPTII,S$GLB,, | Performed by: INTERNAL MEDICINE

## 2023-10-02 PROCEDURE — 99396 PREV VISIT EST AGE 40-64: CPT | Mod: 25,S$GLB,, | Performed by: INTERNAL MEDICINE

## 2023-10-02 PROCEDURE — 3078F PR MOST RECENT DIASTOLIC BLOOD PRESSURE < 80 MM HG: ICD-10-PCS | Mod: CPTII,S$GLB,, | Performed by: INTERNAL MEDICINE

## 2023-10-02 PROCEDURE — 3075F SYST BP GE 130 - 139MM HG: CPT | Mod: CPTII,S$GLB,, | Performed by: INTERNAL MEDICINE

## 2023-10-02 PROCEDURE — 90686 IIV4 VACC NO PRSV 0.5 ML IM: CPT | Mod: S$GLB,,, | Performed by: INTERNAL MEDICINE

## 2023-10-02 PROCEDURE — 3078F DIAST BP <80 MM HG: CPT | Mod: CPTII,S$GLB,, | Performed by: INTERNAL MEDICINE

## 2023-10-02 PROCEDURE — 3044F HG A1C LEVEL LT 7.0%: CPT | Mod: CPTII,S$GLB,, | Performed by: INTERNAL MEDICINE

## 2023-10-02 PROCEDURE — 3044F PR MOST RECENT HEMOGLOBIN A1C LEVEL <7.0%: ICD-10-PCS | Mod: CPTII,S$GLB,, | Performed by: INTERNAL MEDICINE

## 2023-10-02 PROCEDURE — 3075F PR MOST RECENT SYSTOLIC BLOOD PRESS GE 130-139MM HG: ICD-10-PCS | Mod: CPTII,S$GLB,, | Performed by: INTERNAL MEDICINE

## 2023-10-02 RX ORDER — LEVOTHYROXINE SODIUM 100 UG/1
TABLET ORAL
Qty: 90 TABLET | Refills: 3 | Status: SHIPPED | OUTPATIENT
Start: 2023-10-02 | End: 2024-01-05

## 2023-10-02 RX ORDER — CLONAZEPAM 0.5 MG/1
0.5 TABLET ORAL DAILY
Qty: 90 TABLET | Refills: 0 | Status: CANCELLED | OUTPATIENT
Start: 2023-10-02

## 2023-10-02 RX ORDER — CLONAZEPAM 0.5 MG/1
0.5 TABLET ORAL DAILY
Qty: 90 TABLET | Refills: 0 | Status: SHIPPED | OUTPATIENT
Start: 2023-10-02

## 2023-10-02 RX ORDER — VENLAFAXINE 50 MG/1
TABLET ORAL
Qty: 90 TABLET | Refills: 2 | Status: SHIPPED | OUTPATIENT
Start: 2023-10-02 | End: 2023-10-02 | Stop reason: SDUPTHER

## 2023-10-02 RX ORDER — CALCIUM CARBONATE/VITAMIN D3 500-10/5ML
2 LIQUID (ML) ORAL DAILY
Qty: 90 CAPSULE | Refills: 3 | Status: SHIPPED | OUTPATIENT
Start: 2023-10-02

## 2023-10-02 RX ORDER — CHOLECALCIFEROL (VITAMIN D3) 125 MCG
CAPSULE ORAL
Qty: 12 CAPSULE | Refills: 12
Start: 2023-10-02

## 2023-10-02 RX ORDER — LANOLIN ALCOHOL/MO/W.PET/CERES
CREAM (GRAM) TOPICAL
Qty: 12 TABLET | Refills: 12
Start: 2023-10-02

## 2023-10-02 RX ORDER — VENLAFAXINE 50 MG/1
TABLET ORAL
Qty: 270 TABLET | Refills: 3 | Status: SHIPPED | OUTPATIENT
Start: 2023-10-02

## 2023-10-02 NOTE — PROGRESS NOTES
Patient ID: Ranjana Dominguez is a 63 y.o. female.    Chief Complaint: Annual Exam      Assessment:       1. Annual physical exam    2. Acquired hypothyroidism    3. History of bariatric surgery: sleeve 2017    4. Hyperplastic colonic polyp, unspecified part of colon    5. Osteoporosis without current pathological fracture, unspecified osteoporosis type    6. Copper deficiency    7. Iron excess    8. Anxiety    9. Mixed hyperlipidemia    10. Lumbar radiculopathy          Plan:           1. Annual physical exam    2. Acquired hypothyroidism  -     TSH; Future; Expected date: 01/02/2024    3. History of bariatric surgery: sleeve 2017    4. Hyperplastic colonic polyp, unspecified part of colon    5. Osteoporosis without current pathological fracture, unspecified osteoporosis type  -     DXA Bone Density Axial Skeleton 1 or more sites; Future; Expected date: 10/02/2023    6. Copper deficiency  -     COPPER, SERUM; Future; Expected date: 10/02/2023    7. Iron excess  -     CBC Auto Differential; Future; Expected date: 10/02/2023  -     Iron and TIBC; Future; Expected date: 10/02/2023  -     Ferritin; Future; Expected date: 10/02/2023    8. Anxiety    9. Mixed hyperlipidemia    10. Lumbar radiculopathy    Other orders  -     cholecalciferol, vitamin D3, 125 mcg (5,000 unit) capsule; Take 3 x weekly  Dispense: 12 capsule; Refill: 12  -     cyanocobalamin (VITAMIN B-12) 1000 MCG tablet; Take 3 x weekly  Dispense: 12 tablet; Refill: 12  -     Discontinue: venlafaxine (EFFEXOR) 50 MG Tab; TK 1 T PO  TID  Dispense: 90 tablet; Refill: 2  -     copper gluconate 2 mg Cap; Take 2 mg by mouth once daily.  Dispense: 90 capsule; Refill: 3  -     venlafaxine (EFFEXOR) 50 MG Tab; TK 1 T PO  TID  Dispense: 270 tablet; Refill: 3  -     levothyroxine (SYNTHROID) 100 MCG tablet; Take 6 days a week  Dispense: 90 tablet; Refill: 3  -     clonazePAM (KLONOPIN) 0.5 MG tablet; Take 1 tablet (0.5 mg total) by mouth once daily.   "Dispense: 90 tablet; Refill: 0         Borderline hyperthyroid, given her weight, would recommend she reduce Synthroid to 6 days weekly  Can take vitamin-D and B12 3 times weekly  Discontinue multivitamin with iron  Follow-up hemochromatosis labs  Start copper daily  Continue current medication, attempt eventually to wean Klonopin 2 for 5 days weekly only  Continue with outside therapist  Flu shot today  Schedule DEXA  Consider COVID booster  May continue to use antibacterial ointment and keep wound covered until completely healed all those is significantly better currently  I will review all studies and determine further tx depending on findings     Subjective:   Annual exam     with her        Ongoing issues with chronic back pain.  Ended up in the ER with a syncopal episode after she jumped out of bed due to back pain, sustained injury to her arm.  The arm is better currently.  Back pain continues.    See MRI from January 2023:  "Focal area of signal abnormality within the L5 vertebral body, stable when compared to the previous MRI suggesting a benign etiology, likely an atypical hemangioma.     Multilevel degenerative changes of the lumbar spine, most pronounced at L4-L5 with severe spinal canal and left lateral recess stenosis and severe left neural foraminal narrowing.  Findings are stable when compared to prior MRI."    On September 20 she underwent: "Bilateral  L4/5 Lumbar Transforaminal Epidural Steroid Injection under Fluoroscopic Guidance."    Recent labs all acceptable other than low copper and elevated iron, testing for hemochromatosis is still pending.  Lipids discussed.    BMI is low normal.  She has gained a few lb.  She is eating and drinking normally.  Denies GERD, blood in the stool, abdominal pain, black tarry stools or changes in bowels.    The 10-year ASCVD risk score (Mitzi KULKARNI, et al., 2019) is: 4.6%    Values used to calculate the score:      Age: 63 years      Sex: Female      Is " Non- : No      Diabetic: No      Tobacco smoker: No      Systolic Blood Pressure: 130 mmHg      Is BP treated: No      HDL Cholesterol: 66 mg/dL      Total Cholesterol: 192 mg/dL     Patient Active Problem List   Diagnosis    Anxiety    Glucose intolerance (impaired glucose tolerance)    Mixed hyperlipidemia    Acquired hypothyroidism    Family history of colon cancer: father in his 60s    History of bariatric surgery: sleeve 2017    Colon polyp, hyperplastic 2018; adenoma 2021 repeat 3 years    Osteopenia of multiple sites: see DEXA 7/21    Diverticulosis: see colonoscopy 2021    Lumbar radiculopathy    Copper deficiency    Iron excess        Review of Systems   Constitutional:  Positive for activity change. Negative for unexpected weight change.   HENT:  Negative for hearing loss, rhinorrhea and trouble swallowing.    Eyes:  Positive for visual disturbance. Negative for discharge.        Got a new prescription- vision corrected   Respiratory:  Negative for chest tightness and wheezing.    Cardiovascular:  Negative for chest pain and palpitations.   Gastrointestinal:  Positive for constipation. Negative for blood in stool, diarrhea and vomiting.   Endocrine: Negative for polydipsia and polyuria.   Genitourinary:  Negative for difficulty urinating, dysuria, hematuria and menstrual problem.   Musculoskeletal:  Positive for arthralgias. Negative for joint swelling and neck pain.   Neurological:  Negative for weakness and headaches.   Psychiatric/Behavioral:  Negative for confusion and dysphoric mood.         Mood stable on current regimen.  Is asking me to prescribe her meds since her psychiatrist is very expensive.  She has an outside therapist with whom she is continuing treatment.    Taking Klonopin pretty much every night for sleep, discussed my recommendation that she try to wean this over the course of the next several months.   reviewed, pattern review stable         Objective:       Physical Exam  Vitals and nursing note reviewed.   Constitutional:       Appearance: She is well-developed.   HENT:      Head: Normocephalic and atraumatic.      Right Ear: External ear normal.      Left Ear: External ear normal.      Nose: Nose normal.      Mouth/Throat:      Pharynx: No oropharyngeal exudate.   Eyes:      General: No scleral icterus.     Extraocular Movements: Extraocular movements intact.      Conjunctiva/sclera: Conjunctivae normal.   Neck:      Thyroid: No thyromegaly.      Vascular: No JVD.   Cardiovascular:      Rate and Rhythm: Normal rate and regular rhythm.      Heart sounds: Normal heart sounds. No murmur heard.     No gallop.   Pulmonary:      Effort: Pulmonary effort is normal. No respiratory distress.      Breath sounds: Normal breath sounds. No wheezing.   Abdominal:      General: Bowel sounds are normal. There is no distension.      Palpations: Abdomen is soft. There is no mass.      Tenderness: There is no abdominal tenderness. There is no guarding or rebound.   Musculoskeletal:         General: No tenderness. Normal range of motion.      Cervical back: Normal range of motion and neck supple.      Comments: Healing superficial excoriation right arm   Lymphadenopathy:      Cervical: No cervical adenopathy.   Skin:     General: Skin is warm.      Findings: No erythema or rash.   Neurological:      General: No focal deficit present.      Mental Status: She is alert and oriented to person, place, and time.      Cranial Nerves: No cranial nerve deficit.      Coordination: Coordination normal.   Psychiatric:         Behavior: Behavior normal.         Thought Content: Thought content normal.         Judgment: Judgment normal.             Health Maintenance Due   Topic Date Due    COVID-19 Vaccine (6 - Moderna series) 09/16/2022    DEXA Scan  07/16/2023    Influenza Vaccine (1) 09/01/2023

## 2023-10-03 ENCOUNTER — PATIENT MESSAGE (OUTPATIENT)
Dept: PAIN MEDICINE | Facility: CLINIC | Age: 63
End: 2023-10-03
Payer: COMMERCIAL

## 2023-10-03 ENCOUNTER — PATIENT MESSAGE (OUTPATIENT)
Dept: ORTHOPEDICS | Facility: CLINIC | Age: 63
End: 2023-10-03
Payer: COMMERCIAL

## 2023-10-05 ENCOUNTER — OFFICE VISIT (OUTPATIENT)
Dept: ORTHOPEDICS | Facility: CLINIC | Age: 63
End: 2023-10-05
Payer: COMMERCIAL

## 2023-10-05 ENCOUNTER — PATIENT MESSAGE (OUTPATIENT)
Dept: INTERNAL MEDICINE | Facility: CLINIC | Age: 63
End: 2023-10-05
Payer: COMMERCIAL

## 2023-10-05 ENCOUNTER — HOSPITAL ENCOUNTER (OUTPATIENT)
Dept: RADIOLOGY | Facility: HOSPITAL | Age: 63
Discharge: HOME | End: 2023-10-05
Attending: ORTHOPAEDIC SURGERY
Payer: COMMERCIAL

## 2023-10-05 VITALS — HEIGHT: 63 IN | BODY MASS INDEX: 21.12 KG/M2 | WEIGHT: 119.19 LBS

## 2023-10-05 DIAGNOSIS — M54.16 LUMBAR RADICULOPATHY: Primary | ICD-10-CM

## 2023-10-05 DIAGNOSIS — M25.552 LEFT HIP PAIN: ICD-10-CM

## 2023-10-05 DIAGNOSIS — M25.552 LEFT HIP PAIN: Primary | ICD-10-CM

## 2023-10-05 PROBLEM — Z14.8 HEMOCHROMATOSIS CARRIER: Status: ACTIVE | Noted: 2023-10-05

## 2023-10-05 LAB
GENETICIST REVIEW: NORMAL
HFE GENE MUT ANL BLD/T: NORMAL
HFE RELEASED BY: NORMAL
HFE RESULT SUMMARY: NORMAL
REF LAB TEST METHOD: NORMAL
SPECIMEN SOURCE: NORMAL
SPECIMEN,  HEMOCHROMATOSIS: NORMAL

## 2023-10-05 PROCEDURE — 73502 X-RAY EXAM HIP UNI 2-3 VIEWS: CPT | Mod: 26,LT,, | Performed by: RADIOLOGY

## 2023-10-05 PROCEDURE — 1159F PR MEDICATION LIST DOCUMENTED IN MEDICAL RECORD: ICD-10-PCS | Mod: CPTII,S$GLB,, | Performed by: ORTHOPAEDIC SURGERY

## 2023-10-05 PROCEDURE — 99999 PR PBB SHADOW E&M-EST. PATIENT-LVL III: ICD-10-PCS | Mod: PBBFAC,,, | Performed by: ORTHOPAEDIC SURGERY

## 2023-10-05 PROCEDURE — 3008F BODY MASS INDEX DOCD: CPT | Mod: CPTII,S$GLB,, | Performed by: ORTHOPAEDIC SURGERY

## 2023-10-05 PROCEDURE — 3044F PR MOST RECENT HEMOGLOBIN A1C LEVEL <7.0%: ICD-10-PCS | Mod: CPTII,S$GLB,, | Performed by: ORTHOPAEDIC SURGERY

## 2023-10-05 PROCEDURE — 99213 OFFICE O/P EST LOW 20 MIN: CPT | Mod: S$GLB,,, | Performed by: ORTHOPAEDIC SURGERY

## 2023-10-05 PROCEDURE — 1159F MED LIST DOCD IN RCRD: CPT | Mod: CPTII,S$GLB,, | Performed by: ORTHOPAEDIC SURGERY

## 2023-10-05 PROCEDURE — 73502 XR HIP WITH PELVIS WHEN PERFORMED, 2 OR 3 VIEWS LEFT: ICD-10-PCS | Mod: 26,LT,, | Performed by: RADIOLOGY

## 2023-10-05 PROCEDURE — 99999 PR PBB SHADOW E&M-EST. PATIENT-LVL III: CPT | Mod: PBBFAC,,, | Performed by: ORTHOPAEDIC SURGERY

## 2023-10-05 PROCEDURE — 3044F HG A1C LEVEL LT 7.0%: CPT | Mod: CPTII,S$GLB,, | Performed by: ORTHOPAEDIC SURGERY

## 2023-10-05 PROCEDURE — 99213 PR OFFICE/OUTPT VISIT, EST, LEVL III, 20-29 MIN: ICD-10-PCS | Mod: S$GLB,,, | Performed by: ORTHOPAEDIC SURGERY

## 2023-10-05 PROCEDURE — 3008F PR BODY MASS INDEX (BMI) DOCUMENTED: ICD-10-PCS | Mod: CPTII,S$GLB,, | Performed by: ORTHOPAEDIC SURGERY

## 2023-10-05 PROCEDURE — 73502 X-RAY EXAM HIP UNI 2-3 VIEWS: CPT | Mod: TC,LT

## 2023-10-05 RX ORDER — PREGABALIN 75 MG/1
75 CAPSULE ORAL 2 TIMES DAILY
Qty: 60 CAPSULE | Refills: 2 | Status: SHIPPED | OUTPATIENT
Start: 2023-10-05 | End: 2023-10-17

## 2023-10-05 NOTE — PROGRESS NOTES
DATE: 10/5/2023  PATIENT: Ranjana Dominguez    Attending Physician: Fred Campos MD    HISTORY:  Ranjana Dominguez is a 63 y.o. female who returns to me today for follow up.  She was last seen by me 10/24/2022.  Today she is doing well but notes she had a bilateral L4-5 TFESI on 9/20/23 with 100% relief of the pain radiating down the sides of both of her legs. However, she continues to have pain radiating from her lower back into the front of her left thigh. She has continued home exercises with worsening of pain. I provided the patient with a home exercise program. It is the [facility] spine conditioning program. Exercises include head rolls, kneeling back extension, sitting rotation stretch, modified seated side straddle, knee to chest, bird dog, plank, modified seated plank, hip bridges, abdominal bracing, and abdominal crunch. Pt completes daily for 1 hour with worsening of pian. Left thigh pain is currently 10/10      The Patient denies myelopathic symptoms such as handwriting changes or difficulty with buttons/coins/keys. Denies perineal paresthesias, bowel/bladder dysfunction.    PMH/PSH/FamHx/SocHx:  Unchanged from prior visit    ROS:  REVIEW OF SYSTEMS:  Constitution: Negative. Negative for chills, fever and night sweats.   HENT: Negative for congestion and headaches.    Eyes: Negative for blurred vision, left vision loss and right vision loss.   Cardiovascular: Negative for chest pain and syncope.   Respiratory: Negative for cough and shortness of breath.    Endocrine: Negative for polydipsia, polyphagia and polyuria.   Hematologic/Lymphatic: Negative for bleeding problem. Does not bruise/bleed easily.   Skin: Negative for dry skin, itching and rash.   Musculoskeletal: Negative for falls and muscle weakness.   Gastrointestinal: Negative for abdominal pain and bowel incontinence.   Allergic/Immunologic: Negative for hives and persistent infections.  Genitourinary: Negative for urinary  "retention/incontinence and nocturia.   Neurological: negative for disturbances in coordination, no myelopathic symptoms such as handwriting changes or difficulty with buttons, coins, keys or small objects. No loss of balance and seizures.   Psychiatric/Behavioral: Negative for depression. The patient does not have insomnia.   Denies perineal paresthesias, bowel or bladder incontinence    EXAM:  Ht 5' 3" (1.6 m)   Wt 54.1 kg (119 lb 2.5 oz)   LMP 07/09/2004   BMI 21.11 kg/m²     My physical examination was notable for the following findings:     Musculoskeletal and neuro exam stable      IMAGING:    Today I personally re- reviewed AP, Lat and Flex/Ex  upright L-spine that demonstrate  significant degenerative changes without instability.     MRI lumbar demonstrates large bulging disc at L4-5 resulting in severe central stenosis and bilateral left neural foraminal narrowing (L>R). Left sided disc bulge at L2-3 resulting in severe left neural foraminal narrowing.    Xray left hip shows no significant arthritis.    Body mass index is 21.11 kg/m².    Hemoglobin A1C   Date Value Ref Range Status   09/26/2023 4.8 4.0 - 5.6 % Final     Comment:     ADA Screening Guidelines:  5.7-6.4%  Consistent with prediabetes  >or=6.5%  Consistent with diabetes    High levels of fetal hemoglobin interfere with the HbA1C  assay. Heterozygous hemoglobin variants (HbS, HgC, etc)do  not significantly interfere with this assay.   However, presence of multiple variants may affect accuracy.     08/15/2019 5.0 4.0 - 5.6 % Final     Comment:     ADA Screening Guidelines:  5.7-6.4%  Consistent with prediabetes  >or=6.5%  Consistent with diabetes  High levels of fetal hemoglobin interfere with the HbA1C  assay. Heterozygous hemoglobin variants (HbS, HgC, etc)do  not significantly interfere with this assay.   However, presence of multiple variants may affect accuracy.     07/02/2018 5.1 4.0 - 5.6 % Final     Comment:     ADA Screening " Guidelines:  5.7-6.4%  Consistent with prediabetes  >or=6.5%  Consistent with diabetes  High levels of fetal hemoglobin interfere with the HbA1C  assay. Heterozygous hemoglobin variants (HbS, HgC, etc)do  not significantly interfere with this assay.   However, presence of multiple variants may affect accuracy.           ASSESSMENT/PLAN:    There are no diagnoses linked to this encounter.    Today we discussed at length all of the different treatment options including anti-inflammatories, acetaminophen, rest, ice, heat, physical therapy including strengthening and stretching exercises, home exercises, ROM, aerobic conditioning, aqua therapy, other modalities including ultrasound, massage, and dry needling, epidural steroid injections and finally surgical intervention.      Pt presents with chronic L2-3 and L4-5 lumbar radiculopathy. 100% relief from bilateral L4-5 TFESI. Pt continues to have left thigh pain from L2-3 disc herniation. Failure of conservative rx. Will order left L2-3 TFESI. THIS IS A DIFFERENT LEVEL THAN THE CIELO PERFORMED ON 9/20/23. If pain persists, will discuss lumbar spine surgery.

## 2023-10-06 ENCOUNTER — APPOINTMENT (OUTPATIENT)
Dept: RADIOLOGY | Facility: CLINIC | Age: 63
End: 2023-10-06
Attending: INTERNAL MEDICINE
Payer: COMMERCIAL

## 2023-10-06 ENCOUNTER — TELEPHONE (OUTPATIENT)
Dept: PAIN MEDICINE | Facility: CLINIC | Age: 63
End: 2023-10-06
Payer: COMMERCIAL

## 2023-10-06 DIAGNOSIS — M54.16 LUMBAR RADICULOPATHY: Primary | ICD-10-CM

## 2023-10-06 DIAGNOSIS — M81.0 OSTEOPOROSIS WITHOUT CURRENT PATHOLOGICAL FRACTURE, UNSPECIFIED OSTEOPOROSIS TYPE: ICD-10-CM

## 2023-10-06 PROCEDURE — 77080 DXA BONE DENSITY AXIAL: CPT | Mod: 26,,, | Performed by: INTERNAL MEDICINE

## 2023-10-06 PROCEDURE — 77080 DXA BONE DENSITY AXIAL: CPT | Mod: TC,PO

## 2023-10-06 PROCEDURE — 77080 DXA BONE DENSITY AXIAL SKELETON 1 OR MORE SITES: ICD-10-PCS | Mod: 26,,, | Performed by: INTERNAL MEDICINE

## 2023-10-06 NOTE — TELEPHONE ENCOUNTER
----- Message from Tahira Mcmullen PA-C sent at 10/5/2023  1:56 PM CDT -----  Regarding: Order for ERASMO MONTIEL    Patient Name: ERASMO MONTIEL([id number])  Sex: Female  : 1960      PCP: NICK LINARES    Center: [facility]ENTRAL BILLING OFFICE     Types of orders made on 10/05/2023: Medications, Procedure Request    Order Date:10/5/2023  Ordering User:TAHIRA MCMULLEN [[id number]]  Encounter Provider  :Tahira Mcmullen PA-C [[id number]]  Authorizing Provider: Tahira Mcmullen PA-C [[id number]]  Supervising Provider:VERONICA PANCHAL [[id number]]  Type of Supervision:Supervision Required  Department:[facility][[id number]]    Common Order Information  Procedure -> Transforaminal Injection (Specify level and laterality) Cmt: left             L2-3    Order Specific Information  Order: Procedure Order to Pain Man  agement [Custom: [id number]]  Order #:          [id number]: 1 FUTURE    Priority: Routine  Class: Clinic Performed    Future Order Information      Expires on:10/05/2024            Expected by:10/05/2023                   Associated Diagnoses      M54.16 Lumbar radiculopathy      Facility Name: -> [place]           Priority: Routine  Class: Clinic Performed    Future Order Information        Expires on:10/05/2024            Expected by:10/05/2023                   Associated Diagnoses      M54.16 Lumbar radiculopathy      Procedure -> Transforaminal Injection (Specify level and laterality) Cmt:                 left L2-3        Facility Name: -> [facility]

## 2023-10-17 ENCOUNTER — OFFICE VISIT (OUTPATIENT)
Dept: INTERNAL MEDICINE | Facility: CLINIC | Age: 63
End: 2023-10-17
Payer: COMMERCIAL

## 2023-10-17 ENCOUNTER — PATIENT MESSAGE (OUTPATIENT)
Dept: INTERNAL MEDICINE | Facility: CLINIC | Age: 63
End: 2023-10-17

## 2023-10-17 ENCOUNTER — E-CONSULT (OUTPATIENT)
Dept: HEMATOLOGY/ONCOLOGY | Facility: CLINIC | Age: 63
End: 2023-10-17
Payer: COMMERCIAL

## 2023-10-17 DIAGNOSIS — Z14.8 HEMOCHROMATOSIS CARRIER: Primary | ICD-10-CM

## 2023-10-17 DIAGNOSIS — E83.19 IRON EXCESS: ICD-10-CM

## 2023-10-17 DIAGNOSIS — Z98.84 HISTORY OF BARIATRIC SURGERY: Primary | ICD-10-CM

## 2023-10-17 DIAGNOSIS — Z14.8 HEMOCHROMATOSIS CARRIER: ICD-10-CM

## 2023-10-17 DIAGNOSIS — E61.0 COPPER DEFICIENCY: ICD-10-CM

## 2023-10-17 PROCEDURE — 99214 OFFICE O/P EST MOD 30 MIN: CPT | Mod: 95,,, | Performed by: INTERNAL MEDICINE

## 2023-10-17 PROCEDURE — 99214 PR OFFICE/OUTPT VISIT, EST, LEVL IV, 30-39 MIN: ICD-10-PCS | Mod: 95,,, | Performed by: INTERNAL MEDICINE

## 2023-10-17 PROCEDURE — 3044F HG A1C LEVEL LT 7.0%: CPT | Mod: CPTII,95,, | Performed by: INTERNAL MEDICINE

## 2023-10-17 PROCEDURE — 3044F PR MOST RECENT HEMOGLOBIN A1C LEVEL <7.0%: ICD-10-PCS | Mod: CPTII,95,, | Performed by: INTERNAL MEDICINE

## 2023-10-17 PROCEDURE — 99451 NTRPROF PH1/NTRNET/EHR 5/>: CPT | Mod: S$GLB,,, | Performed by: INTERNAL MEDICINE

## 2023-10-17 PROCEDURE — 99451 PR INTERPROF, PHONE/INTERNET/EHR, CONSULT, >= 5MINS: ICD-10-PCS | Mod: S$GLB,,, | Performed by: INTERNAL MEDICINE

## 2023-10-17 NOTE — PROGRESS NOTES
Telemedicine Video Visit    The patient location is:  Patient Home   The chief complaint leading to consultation is: follow up  Visit type: Virtual visit with synchronous audio and video  Total time spent with patient: 25 minutes  Each patient to whom he or she provides medical services by telemedicine is:  (1) informed of the relationship between the physician and patient and the respective role of any other health care provider with respect to management of the patient; and (2) notified that he or she may decline to receive medical services by telemedicine and may withdraw from such care at any time.     Copper low  On a bariatric vitamin which has some iron  Iron is elevated, reviewed.  Follow-up labs showed 1 copy of gene for hemochromatosis.  Discussed natural history of this and need for follow-up.    Patient Active Problem List   Diagnosis    Anxiety    Glucose intolerance (impaired glucose tolerance)    Mixed hyperlipidemia    Acquired hypothyroidism    Family history of colon cancer: father in his 60s    History of bariatric surgery: sleeve 2017    Colon polyp, hyperplastic 2018; adenoma 2021 repeat 3 years    Osteopenia of multiple sites: see DEXA 7/21; stable 10/23    Diverticulosis: see colonoscopy 2021    Lumbar radiculopathy    Copper deficiency    Iron excess    Hemochromatosis carrier: heterozygous for H63D (10/23)      Review of Systems   HENT:  Negative for hearing loss.    Eyes:  Positive for discharge.        Some allergic eye issues, abated   Respiratory:  Negative for wheezing.    Cardiovascular:  Negative for chest pain and palpitations.   Gastrointestinal:  Positive for constipation and diarrhea. Negative for vomiting.        Chronic stable symptoms, controlled   Genitourinary:  Negative for hematuria.   Neurological:  Negative for headaches.      Physical Exam  Constitutional:       Appearance: She is well-developed.   HENT:      Head: Normocephalic and atraumatic.   Eyes:      Extraocular  "Movements: Extraocular movements intact.      Conjunctiva/sclera: Conjunctivae normal.   Neck:      Thyroid: No thyromegaly.   Pulmonary:      Effort: No respiratory distress.   Neurological:      General: No focal deficit present.      Mental Status: She is alert and oriented to person, place, and time.   Psychiatric:         Mood and Affect: Mood normal.         Behavior: Behavior normal.         Thought Content: Thought content normal.         Judgment: Judgment normal.        1. History of bariatric surgery: sleeve 2017  -     E-Consult to [facility]    2. Copper deficiency    3. Hemochromatosis carrier: heterozygous for H63D (10/23)  Overview:  Hematology e-consult 10/23:  "Do not suspect iron overload and no further work up is needed other than monitoring Iron panel as clinically indicated.      Contingency: Refer to clinic in case of Iron saturation over 45% and ferritin of over 1000 in which case she would need liver/cardiac MRI and liver biopsy and phlebotomy. "      Orders:  -     E-Consult to [facility]    4. Iron excess  -     E-Consult to [facility]       She will discontinue bariatric vitamins that may have any iron  Anticipate follow-up labs within the next several months  All questions answered  Continue copper and other meds      Answers submitted by the patient for this visit:  Review of Systems Questionnaire (Submitted on 10/16/2023)  activity change: Yes  trouble swallowing: No  chest tightness: No  difficulty urinating: No  dysphoric mood: No    "

## 2023-10-17 NOTE — CONSULTS
[facility] - Hematology Oncology  Response for E-Consult     Patient Name: Ranjana Dominguez  MRN: [id number]  Primary Care Provider: Malena Pinto MD   Requesting Provider: Malena Pinto MD  E-Consult to [facility]  Consult performed by: Viktoriya Garvin MD  Consult ordered by: Malena Pinto MD  Reason for consult: Hemochromatosis carrier  Assessment/Recommendations: See Consult.           Recommendation: Heterozygous for H63D/carrier. Iron saturation is mildly elevated but ferritin is normal. Do not suspect iron overload and no further work up is needed other than monitoring Iron panel as clinically indicated.     Contingency: Refer to clinic in case of Iron saturation over 45% and ferritin of over 1000 in which case she would need liver/cardiac MRI and liver biopsy and phlebotomy.     Total time of Consultation: 10 minute    I did not speak to the requesting provider verbally about this.     *This eConsult is based on the clinical data available to me and is furnished without benefit of a physical examination. The eConsult will need to be interpreted in light of any clinical issues or changes in patient status not available to me at the time of filing this eConsults. Significant changes in patient condition or level of acuity should result in immediate formal consultation and reevaluation. Please alert me if you have further questions.    Thank you for this eConsult referral.     Viktoriya Garvin MD  [facility] - Hematology Oncology

## 2023-10-20 ENCOUNTER — TELEPHONE (OUTPATIENT)
Dept: PAIN MEDICINE | Facility: CLINIC | Age: 63
End: 2023-10-20
Payer: COMMERCIAL

## 2023-10-25 NOTE — PRE-PROCEDURE INSTRUCTIONS
The following was discussed with pt via phone and sent to pt portal. Pt verbalized understanding.    Dear Ranjana ,     You are scheduled for a procedure with Dr. Jorge Shipman on 10/26/2023.  Your scheduled arrival time is 7:10 am.  This arrival time is roughly 1 hour before your anticipated procedure time to allow sufficient time for pre-op..  Please wear comfortable clothes.  Most patients do not need to change into a gown.  Please do not wear a dress.  This procedure will take place at the [facility] at the corner of [facility] and [facility].  It is in the [place] next to [facility].  The address is:     [address] [place] [address].  [place] [id number]     After entering the building, you will proceed to the second floor where you can check in with registration. You should take any medications that you routinely take for blood pressure, heart medications, thyroid, cholesterol, etc.      The fasting restrictions are dependent on whether or not you are receiving sedation.  Sedation is not available for all procedures.      Your fasting instructions are as follow:  IV sedation. You should not eat for 8 hours and can only drink clear liquids (water or black coffee without cream/sugar) up until 2 hours before your scheduled time.  You CANNOT drive yourself and must have a .     If you are on blood thinners, you need to follow the anticoagulation instructions that had been discussed previously.  You should only stop the blood thinners if it was approved by your primary care physician or your cardiologist.  In the event that you are not able to stop your blood thinners, a blood thinner was not listed on your medication list, or we were not able to get clearance from your cardiologist, then the procedure may have to be postponed/canceled.      IF you were told to stop your blood thinners, this is how long you should generally hold some of the more common ones.   Remember that stopping blood thinners is only necessary for certain procedures. If you are unsure of your instructions, please call us.   Aspirin - 5 days  Plavix/Clopidogrel - 7 days  Warfarin / Coumadin - 5 days  Eliquis - 3 days  Pradaxa/Dabigatran - 4 days  Xarelto/Rivaroxaban - 3 days     If you are a diabetic, do not take your medication if you will be fasting, but bring it with you. Please plan on being here for roughly 2 hours.     Please call us if you have been sick (running fever, having any flu-like symptoms) or have been taking antibiotics in the past 2 weeks or had any outpatient procedures other than with us (colonoscopy, endoscopy, OBGYN, dental, etc.). If you have been previously COVID positive, you will need to hold off on your procedure until you are symptom free for 10 days. If you did not have any symptoms, you can have your procedure 10 days from your positive test result.       Thank you,  [facility]   [place], LPN [facility]

## 2023-10-26 ENCOUNTER — HOSPITAL ENCOUNTER (OUTPATIENT)
Facility: HOSPITAL | Age: 63
Discharge: HOME | End: 2023-10-26
Attending: STUDENT IN AN ORGANIZED HEALTH CARE EDUCATION/TRAINING PROGRAM | Admitting: STUDENT IN AN ORGANIZED HEALTH CARE EDUCATION/TRAINING PROGRAM
Payer: COMMERCIAL

## 2023-10-26 VITALS
SYSTOLIC BLOOD PRESSURE: 138 MMHG | OXYGEN SATURATION: 100 % | RESPIRATION RATE: 16 BRPM | WEIGHT: 116 LBS | BODY MASS INDEX: 20.55 KG/M2 | HEART RATE: 64 BPM | TEMPERATURE: 97 F | HEIGHT: 63 IN | DIASTOLIC BLOOD PRESSURE: 75 MMHG

## 2023-10-26 DIAGNOSIS — M54.16 LUMBAR RADICULOPATHY: Primary | ICD-10-CM

## 2023-10-26 PROCEDURE — 25500020 PHARM REV CODE 255: Performed by: STUDENT IN AN ORGANIZED HEALTH CARE EDUCATION/TRAINING PROGRAM

## 2023-10-26 PROCEDURE — 64483 NJX AA&/STRD TFRM EPI L/S 1: CPT | Mod: LT | Performed by: STUDENT IN AN ORGANIZED HEALTH CARE EDUCATION/TRAINING PROGRAM

## 2023-10-26 PROCEDURE — 64483 PR EPIDURAL INJ, ANES/STEROID, TRANSFORAMINAL, LUMB/SACR, SNGL LEVL: ICD-10-PCS | Mod: LT,,, | Performed by: STUDENT IN AN ORGANIZED HEALTH CARE EDUCATION/TRAINING PROGRAM

## 2023-10-26 PROCEDURE — 63600175 PHARM REV CODE 636 W HCPCS: Performed by: STUDENT IN AN ORGANIZED HEALTH CARE EDUCATION/TRAINING PROGRAM

## 2023-10-26 PROCEDURE — 64483 NJX AA&/STRD TFRM EPI L/S 1: CPT | Mod: LT,,, | Performed by: STUDENT IN AN ORGANIZED HEALTH CARE EDUCATION/TRAINING PROGRAM

## 2023-10-26 PROCEDURE — 25000003 PHARM REV CODE 250: Performed by: STUDENT IN AN ORGANIZED HEALTH CARE EDUCATION/TRAINING PROGRAM

## 2023-10-26 RX ORDER — LIDOCAINE HYDROCHLORIDE 20 MG/ML
INJECTION, SOLUTION EPIDURAL; INFILTRATION; INTRACAUDAL; PERINEURAL
Status: DISCONTINUED | OUTPATIENT
Start: 2023-10-26 | End: 2023-10-26 | Stop reason: HOSPADM

## 2023-10-26 RX ORDER — FENTANYL CITRATE 50 UG/ML
25 INJECTION, SOLUTION INTRAMUSCULAR; INTRAVENOUS ONCE AS NEEDED
Status: COMPLETED | OUTPATIENT
Start: 2023-10-26 | End: 2023-10-26

## 2023-10-26 RX ORDER — LIDOCAINE HYDROCHLORIDE 10 MG/ML
INJECTION, SOLUTION EPIDURAL; INFILTRATION; INTRACAUDAL; PERINEURAL
Status: DISCONTINUED | OUTPATIENT
Start: 2023-10-26 | End: 2023-10-26 | Stop reason: HOSPADM

## 2023-10-26 RX ORDER — SODIUM CHLORIDE 9 MG/ML
500 INJECTION, SOLUTION INTRAVENOUS CONTINUOUS
Status: DISCONTINUED | OUTPATIENT
Start: 2023-10-26 | End: 2023-10-26 | Stop reason: HOSPADM

## 2023-10-26 RX ORDER — DEXAMETHASONE SODIUM PHOSPHATE 10 MG/ML
INJECTION INTRAMUSCULAR; INTRAVENOUS
Status: DISCONTINUED | OUTPATIENT
Start: 2023-10-26 | End: 2023-10-26 | Stop reason: HOSPADM

## 2023-10-26 RX ORDER — MIDAZOLAM HYDROCHLORIDE 1 MG/ML
2 INJECTION INTRAMUSCULAR; INTRAVENOUS ONCE AS NEEDED
Status: COMPLETED | OUTPATIENT
Start: 2023-10-26 | End: 2023-10-26

## 2023-10-26 NOTE — DISCHARGE INSTRUCTIONS
[facility]  [facility]  [employer] service # [phone]    POST-PROCEDURE INSTRUCTIONS:    Today you had an injection that included a steroid medications.  The steroid may or may not have been mixed with a local anesthetic when it was injected.   If the injection was in the neck, you may feel some pressure, numbness, or slight weakness in the arm after the procedure for a short period of time (this is a normal response), if this persists for longer than 1 day please contact our office or go to the emergency room.  If the injection was in the low back, you may feel some pressure, numbness, or slight weakness in the leg after the procedure for a short period of time (this is a normal response), if this persists for longer than 1 day please contact our office or go to the emergency room.  You may get side effects from the steroid.  This is not uncommon.  Symptoms include: elevated blood sugar, elevated blood pressure, headache, flushing, nausea, insomnia.  These symptoms are transient and will resolve within 1-3 days.  If symptoms last longer than this please contact our office or head to the emergency room.  Steroid medications can take anywhere from 3-14 days to take effect (rarely longer).  You may notice that your pain worsens for a short period of time after the injection, this would not be unusual due to the pressure and trauma from the needle.    If you do not have a follow up appointment scheduled, please contact my office (or the office of the physician who referred you for the procedure) to get a post-procedure follow up scheduled 2-4 weeks after the procedure.  This can be done as a virtual visit if that is more convenient for you.      What you need to do:    Keep a record of your response to the injection you had today.    How much relief did you get?   When did the relief start and how long did it last?  Were you able to decrease the use of any of your pain  medications?  Were you able to increase your level of activity?  How long did the relief last?    What to watch out for:    If you experience any of the following symptoms after your procedure, please notify the messaging service immediately (see above for contact information):   fever (increased oral temperature)   bleeding or swelling at the injection site,    drainage, rash or redness at the injection site    possible signs of infection    increased pain at the injection site   worsening of your usual pain   severe headache   new or worsening numbness    new arm and/or leg weakness, or    changes in bowel and/or bladder function: urinating or defecating on yourself and not knowing that you did it.    PLEASE FOLLOW ALL INSTRUCTIONS CAREFULLY     Do not engage in strenuous activity (e.g., lifting or pushing heavy objects or repeated bending) for 24 hours.     Do not take a bath, swim or use Jacuzzi for 24 hours after procedure. (A shower is fine).   Remove any Band-Aids when you get home.    Use cold/ice, as needed for comfort.  We recommend the use of cold therapy alternating on for 20 minutes, off for 20 minutes.    Do not apply direct heat (heating pad or heat packs) to the injection site for 24 hours.     Resume your usual medications, unless instructed otherwise by your Pain Physician.     If you are on warfarin (Coumadin) or other blood thinner, resume this medication as instructed by your prescribing Physician.    IF AT ANY POINT YOU ARE VERY CONCERNED ABOUT YOUR SYMPTOMS, PLEASE GO TO THE EMERGENCY ROOM.    If you develop worsening pain, weakness, numbness, lose bowel or bladder control (i.e., having an accident where you did not even know you had to go to the bathroom and suddenly noticed you soiled yourself), saddle anesthesia (a loss of sensation restricted to the area of the buttocks, anus and between the legs -- i.e., those parts of your body that would touch a saddle if you were sitting on one) you  need to go immediately to the emergency department for evaluation and treatment.    ----------------------------------------------------------------------------------------------------------------------------------------------------------------  If you received Sedation please read the following instructions:  POST SEDATION INSTRUCTIONS    Today you received intravenous medication (also known as sedation) that was used to help you relax and/or decrease discomfort during your procedure. This medication will be acting in your body for the next 24 hours, so you might feel a little tired or sleepy. This feeling will slowly wear off.   Common side effects associated with these medications include: drowsiness, dizziness, sleepiness, confusion, feeling excited, difficulty remembering things, lack of steadiness with walking or balance, loss of fine muscle control, slowed reflexes, difficulty focusing, and blurred vision.  Some over-the-counter and prescription medications (e.g., muscle relaxants, opioids, mood-altering medications, sedatives/hypnotics, antihistamines) can interact with the intravenous medication you received and cause an increased risk of the side effects listed above in addition to other potentially life threatening side effects. Use extreme caution if you are taking such medications, and consult with your Pain Physician or prescribing physician if you have any questions.  For the next 12-24 hours:    DO NOT--Drive a car, operate machinery or power tools   DO NOT--Drink any alcoholic beverages (not even beer), they may dangerously increase the risk of side effects.    DO NOT--Make any important legal or business decisions or sign important documents.  We advise you to have someone to assist you at home. Move slowly and carefully. Do not make sudden changes in position. Be aware of dizziness or light-headedness and move accordingly.   If you seek medical treatment within 24 hours, let the nurse or doctor  caring for you know that you have received the above medications. If you have any questions or concerns related to your sedation or treatment today please contact us.

## 2023-10-26 NOTE — PLAN OF CARE
Chart reviewed. Preop nursing care completed per orders. Safe surgery checklist complete. Pt AAOX3, VSS on room air. Pt toileted, Bed locked in lowest position, Call light within reach. Pt denies any needs at this time. Will continue to monitor.

## 2023-10-26 NOTE — DISCHARGE SUMMARY
[facility]  Discharge Note  Short Stay    Procedure(s) (LRB):  LT L2-3 TFESI (Left)      OUTCOME: Patient tolerated treatment/procedure well without complication and is now ready for discharge.    DISPOSITION: Home or Self Care    FINAL DIAGNOSIS:  <principal problem not specified>    FOLLOWUP: In clinic    DISCHARGE INSTRUCTIONS:  No discharge procedures on file.     TIME SPENT ON DISCHARGE: 10 minutes

## 2023-10-26 NOTE — OP NOTE
"PROCEDURE: left Lumbar L2-3 Transforaminal Epidural Steroid Injection    Patient Name: Ranjana Dominguez  MRN: [id number]    PROCEDURE DATE: 10/26/2023    INJECTION # 3    DIAGNOSIS: Lumbar Radiculopathy  CPT CODE: 67540 (INJECTION(S), ANESTHETIC AGENT(S) AND/OR STEROID; TRANSFORAMINAL EPIDURAL, WITH IMAGING GUIDANCE (FLUOROSCOPY OR CT), LUMBAR OR SACRAL, SINGLE LEVEL), 82709 (Each additional level).     POSTPROCEDURE DIAGNOSIS: Same    PHYSICIAN: Jorge Shipman DO  NEEDLE TYPE: - 22G 3.5" Spinal Needle  MEDICATIONS INJECTED: 3ml mixture of 1ml Dexamethasone 10mg/ml and 2ml 1% lidocaine PF split equally between each site.  CONTRAST: Omni 300    Sedation Medications - Mild Sedation with 2md Versed and 50mcg Fentanyl    Estimated Blood Loss - <2ml  Drains: None  Specimens Removed: None  Urine Output - Not Measured  Complications: None  Outcome: Good    Informed Consent:  The patient's condition and proposed procedures, risks, and alternatives were discussed with the patient or responsible party.  The patient's / responsible party's questions were answered.   The patient / responsible party appeared to understand and chose to proceed.  Informed consent was obtained.  After obtaining written consent, an IV hep lock was placed. (See nurses notes for details).     Procedure in Detail:  The patient was taken back to the OR suite and placed in a prone position. The skin overlying the injection site was prepped and draped in an aseptic fashion. The target injection site (see above) was identified with fluoroscopy.     Procedural Pause:  A procedural pause verifying correct patient, medical record number, allergies, medications to be administered, current vital signs, and surgical site was performed immediately prior to beginning the procedure.    The skin and subcutaneous tissue overlying the target site(s) of injection for the L2-3 transforaminal epidural steroid injection was/were anesthetized using 4 " mL of 1% lidocaine with a 25-gauge, 1½-inch needle.      The fluoroscopic beam was aligned to create a tunnel view.  The above noted needle was advanced parallel to the fluoroscopic beam towards the above noted foramen under fluoroscopic guidance.  The final position of the needle(s) was identified using AP and lateral views.  Paresthesias were not noted with final needle positioning.       A microbore extension tubing was attached to the needle to minimize any movement of the needle during injection or syringe change.  After negative aspiration for heme or CSF at each site(s) where the needle(s) was placed, 0.5ml of contrast dye was injected to confirm appropriate placement and that there was no vascular uptake.  Pain provocation by the injected contrast material was not noted.  Then the injectate solution described above was injected in increments.  The needle was then retracted approximately snf and the needle track was flushed with 0.5 mL of Lidocaine 1%.  The needle(s) was then removed.     The same procedural technique outlined above was not repeated on the OPPOSITE side.    The heart rate, pulse oximetry, and blood pressure were continuously monitored throughout the procedure.  The procedure was well tolerated. She was carefully escorted to the recovery room in stable condition. Patient was monitored by RN for recovery period.  The patient will be contacted in the next few days to determine extent of relief.  Patient was given post procedure and discharge instructions to follow at home.  The patient was discharged in a stable condition.    Note Electronically Signed By:  Jorge Rowe  10/26/2023

## 2024-01-03 ENCOUNTER — LAB VISIT (OUTPATIENT)
Dept: LAB | Facility: HOSPITAL | Age: 64
End: 2024-01-03
Payer: COMMERCIAL

## 2024-01-03 DIAGNOSIS — E03.9 ACQUIRED HYPOTHYROIDISM: ICD-10-CM

## 2024-01-03 DIAGNOSIS — Z00.00 ANNUAL PHYSICAL EXAM: ICD-10-CM

## 2024-01-03 DIAGNOSIS — E61.0 COPPER DEFICIENCY: ICD-10-CM

## 2024-01-03 DIAGNOSIS — E83.19 IRON EXCESS: ICD-10-CM

## 2024-01-03 LAB
ALBUMIN SERPL BCP-MCNC: 3.9 G/DL (ref 3.5–5.2)
ALP SERPL-CCNC: 47 U/L (ref 55–135)
ALT SERPL W/O P-5'-P-CCNC: 21 U/L (ref 10–44)
ANION GAP SERPL CALC-SCNC: 10 MMOL/L (ref 8–16)
AST SERPL-CCNC: 25 U/L (ref 10–40)
BASOPHILS # BLD AUTO: 0.09 K/UL (ref 0–0.2)
BASOPHILS # BLD AUTO: 0.09 K/UL (ref 0–0.2)
BASOPHILS NFR BLD: 2 % (ref 0–1.9)
BASOPHILS NFR BLD: 2 % (ref 0–1.9)
BILIRUB SERPL-MCNC: 0.6 MG/DL (ref 0.1–1)
BUN SERPL-MCNC: 17 MG/DL (ref 8–23)
CALCIUM SERPL-MCNC: 9.2 MG/DL (ref 8.7–10.5)
CHLORIDE SERPL-SCNC: 102 MMOL/L (ref 95–110)
CO2 SERPL-SCNC: 29 MMOL/L (ref 23–29)
CREAT SERPL-MCNC: 0.9 MG/DL (ref 0.5–1.4)
DIFFERENTIAL METHOD BLD: ABNORMAL
DIFFERENTIAL METHOD BLD: ABNORMAL
EOSINOPHIL # BLD AUTO: 0 K/UL (ref 0–0.5)
EOSINOPHIL # BLD AUTO: 0 K/UL (ref 0–0.5)
EOSINOPHIL NFR BLD: 0.4 % (ref 0–8)
EOSINOPHIL NFR BLD: 0.4 % (ref 0–8)
ERYTHROCYTE [DISTWIDTH] IN BLOOD BY AUTOMATED COUNT: 11.8 % (ref 11.5–14.5)
ERYTHROCYTE [DISTWIDTH] IN BLOOD BY AUTOMATED COUNT: 11.8 % (ref 11.5–14.5)
EST. GFR  (NO RACE VARIABLE): >60 ML/MIN/1.73 M^2
FERRITIN SERPL-MCNC: 101 NG/ML (ref 20–300)
GLUCOSE SERPL-MCNC: 106 MG/DL (ref 70–110)
HCT VFR BLD AUTO: 45.7 % (ref 37–48.5)
HCT VFR BLD AUTO: 45.7 % (ref 37–48.5)
HGB BLD-MCNC: 15.2 G/DL (ref 12–16)
HGB BLD-MCNC: 15.2 G/DL (ref 12–16)
IMM GRANULOCYTES # BLD AUTO: 0.01 K/UL (ref 0–0.04)
IMM GRANULOCYTES # BLD AUTO: 0.01 K/UL (ref 0–0.04)
IMM GRANULOCYTES NFR BLD AUTO: 0.2 % (ref 0–0.5)
IMM GRANULOCYTES NFR BLD AUTO: 0.2 % (ref 0–0.5)
IRON SERPL-MCNC: 152 UG/DL (ref 30–160)
LYMPHOCYTES # BLD AUTO: 1.3 K/UL (ref 1–4.8)
LYMPHOCYTES # BLD AUTO: 1.3 K/UL (ref 1–4.8)
LYMPHOCYTES NFR BLD: 29.6 % (ref 18–48)
LYMPHOCYTES NFR BLD: 29.6 % (ref 18–48)
MCH RBC QN AUTO: 32.7 PG (ref 27–31)
MCH RBC QN AUTO: 32.7 PG (ref 27–31)
MCHC RBC AUTO-ENTMCNC: 33.3 G/DL (ref 32–36)
MCHC RBC AUTO-ENTMCNC: 33.3 G/DL (ref 32–36)
MCV RBC AUTO: 98 FL (ref 82–98)
MCV RBC AUTO: 98 FL (ref 82–98)
MONOCYTES # BLD AUTO: 0.5 K/UL (ref 0.3–1)
MONOCYTES # BLD AUTO: 0.5 K/UL (ref 0.3–1)
MONOCYTES NFR BLD: 11.9 % (ref 4–15)
MONOCYTES NFR BLD: 11.9 % (ref 4–15)
NEUTROPHILS # BLD AUTO: 2.5 K/UL (ref 1.8–7.7)
NEUTROPHILS # BLD AUTO: 2.5 K/UL (ref 1.8–7.7)
NEUTROPHILS NFR BLD: 55.9 % (ref 38–73)
NEUTROPHILS NFR BLD: 55.9 % (ref 38–73)
NRBC BLD-RTO: 0 /100 WBC
NRBC BLD-RTO: 0 /100 WBC
PLATELET # BLD AUTO: 288 K/UL (ref 150–450)
PLATELET # BLD AUTO: 288 K/UL (ref 150–450)
PMV BLD AUTO: 11.2 FL (ref 9.2–12.9)
PMV BLD AUTO: 11.2 FL (ref 9.2–12.9)
POTASSIUM SERPL-SCNC: 4.7 MMOL/L (ref 3.5–5.1)
PROT SERPL-MCNC: 6.5 G/DL (ref 6–8.4)
RBC # BLD AUTO: 4.65 M/UL (ref 4–5.4)
RBC # BLD AUTO: 4.65 M/UL (ref 4–5.4)
SATURATED IRON: 42 % (ref 20–50)
SODIUM SERPL-SCNC: 141 MMOL/L (ref 136–145)
T4 FREE SERPL-MCNC: 0.74 NG/DL (ref 0.71–1.51)
TOTAL IRON BINDING CAPACITY: 358 UG/DL (ref 250–450)
TRANSFERRIN SERPL-MCNC: 242 MG/DL (ref 200–375)
TSH SERPL DL<=0.005 MIU/L-ACNC: 11.13 UIU/ML (ref 0.4–4)
WBC # BLD AUTO: 4.46 K/UL (ref 3.9–12.7)
WBC # BLD AUTO: 4.46 K/UL (ref 3.9–12.7)

## 2024-01-03 PROCEDURE — 82525 ASSAY OF COPPER: CPT | Performed by: INTERNAL MEDICINE

## 2024-01-03 PROCEDURE — 82728 ASSAY OF FERRITIN: CPT | Performed by: INTERNAL MEDICINE

## 2024-01-03 PROCEDURE — 85025 COMPLETE CBC W/AUTO DIFF WBC: CPT | Performed by: INTERNAL MEDICINE

## 2024-01-03 PROCEDURE — 80053 COMPREHEN METABOLIC PANEL: CPT | Performed by: INTERNAL MEDICINE

## 2024-01-03 PROCEDURE — 84443 ASSAY THYROID STIM HORMONE: CPT | Performed by: INTERNAL MEDICINE

## 2024-01-03 PROCEDURE — 83540 ASSAY OF IRON: CPT | Performed by: INTERNAL MEDICINE

## 2024-01-03 PROCEDURE — 84439 ASSAY OF FREE THYROXINE: CPT | Performed by: INTERNAL MEDICINE

## 2024-01-03 PROCEDURE — 36415 COLL VENOUS BLD VENIPUNCTURE: CPT | Performed by: INTERNAL MEDICINE

## 2024-01-04 ENCOUNTER — PATIENT MESSAGE (OUTPATIENT)
Dept: INTERNAL MEDICINE | Facility: CLINIC | Age: 64
End: 2024-01-04
Payer: COMMERCIAL

## 2024-01-04 DIAGNOSIS — Z12.31 SCREENING MAMMOGRAM, ENCOUNTER FOR: ICD-10-CM

## 2024-01-04 DIAGNOSIS — E03.9 ACQUIRED HYPOTHYROIDISM: Primary | ICD-10-CM

## 2024-01-05 RX ORDER — LEVOTHYROXINE SODIUM 112 UG/1
112 TABLET ORAL
Qty: 30 TABLET | Refills: 11 | Status: SHIPPED | OUTPATIENT
Start: 2024-01-05 | End: 2025-01-04

## 2024-01-05 NOTE — TELEPHONE ENCOUNTER
Okay to let her know that the other labs are acceptable, no cause for concern.  I have increased her thyroid to 112 mcg daily.  There is 1 blood test that is still not back yet and I will review that when it is completed, that is her copper level.    Please schedule for labs in 3 months, orders in thank you    In addition, please encourage her to schedule her mammogram which is currently due, orders in thank you

## 2024-01-08 ENCOUNTER — HOSPITAL ENCOUNTER (OUTPATIENT)
Facility: HOSPITAL | Age: 64
Discharge: HOME | End: 2024-01-09
Attending: STUDENT IN AN ORGANIZED HEALTH CARE EDUCATION/TRAINING PROGRAM | Admitting: STUDENT IN AN ORGANIZED HEALTH CARE EDUCATION/TRAINING PROGRAM
Payer: COMMERCIAL

## 2024-01-08 DIAGNOSIS — R55 SYNCOPE: Primary | ICD-10-CM

## 2024-01-08 DIAGNOSIS — R00.1 BRADYCARDIA: ICD-10-CM

## 2024-01-08 DIAGNOSIS — R07.9 CHEST PAIN: ICD-10-CM

## 2024-01-08 LAB
ALBUMIN SERPL BCP-MCNC: 3.3 G/DL (ref 3.5–5.2)
ALP SERPL-CCNC: 42 U/L (ref 55–135)
ALT SERPL W/O P-5'-P-CCNC: 14 U/L (ref 10–44)
AMPHET+METHAMPHET UR QL: NEGATIVE
ANION GAP SERPL CALC-SCNC: 10 MMOL/L (ref 8–16)
AST SERPL-CCNC: 19 U/L (ref 10–40)
BARBITURATES UR QL SCN>200 NG/ML: NEGATIVE
BASOPHILS # BLD AUTO: 0.09 K/UL (ref 0–0.2)
BASOPHILS NFR BLD: 1.8 % (ref 0–1.9)
BENZODIAZ UR QL SCN>200 NG/ML: NEGATIVE
BILIRUB SERPL-MCNC: 0.2 MG/DL (ref 0.1–1)
BILIRUB UR QL STRIP: NEGATIVE
BNP SERPL-MCNC: 25 PG/ML (ref 0–99)
BUN SERPL-MCNC: 13 MG/DL (ref 8–23)
BZE UR QL SCN: NEGATIVE
CALCIUM SERPL-MCNC: 8.4 MG/DL (ref 8.7–10.5)
CANNABINOIDS UR QL SCN: ABNORMAL
CHLORIDE SERPL-SCNC: 103 MMOL/L (ref 95–110)
CLARITY UR REFRACT.AUTO: CLEAR
CO2 SERPL-SCNC: 25 MMOL/L (ref 23–29)
COLOR UR AUTO: YELLOW
CREAT SERPL-MCNC: 0.9 MG/DL (ref 0.5–1.4)
CREAT UR-MCNC: 81 MG/DL (ref 15–325)
DIFFERENTIAL METHOD BLD: ABNORMAL
EOSINOPHIL # BLD AUTO: 0 K/UL (ref 0–0.5)
EOSINOPHIL NFR BLD: 0.6 % (ref 0–8)
ERYTHROCYTE [DISTWIDTH] IN BLOOD BY AUTOMATED COUNT: 11.7 % (ref 11.5–14.5)
EST. GFR  (NO RACE VARIABLE): >60 ML/MIN/1.73 M^2
ETHANOL UR-MCNC: 20 MG/DL
GLUCOSE SERPL-MCNC: 97 MG/DL (ref 70–110)
GLUCOSE UR QL STRIP: NEGATIVE
HCT VFR BLD AUTO: 39.7 % (ref 37–48.5)
HGB BLD-MCNC: 13.2 G/DL (ref 12–16)
HGB UR QL STRIP: NEGATIVE
IMM GRANULOCYTES # BLD AUTO: 0.01 K/UL (ref 0–0.04)
IMM GRANULOCYTES NFR BLD AUTO: 0.2 % (ref 0–0.5)
INFLUENZA A, MOLECULAR: NEGATIVE
INFLUENZA B, MOLECULAR: NEGATIVE
KETONES UR QL STRIP: ABNORMAL
LEUKOCYTE ESTERASE UR QL STRIP: ABNORMAL
LYMPHOCYTES # BLD AUTO: 1.3 K/UL (ref 1–4.8)
LYMPHOCYTES NFR BLD: 26.3 % (ref 18–48)
MAGNESIUM SERPL-MCNC: 1.8 MG/DL (ref 1.6–2.6)
MCH RBC QN AUTO: 32.6 PG (ref 27–31)
MCHC RBC AUTO-ENTMCNC: 33.2 G/DL (ref 32–36)
MCV RBC AUTO: 98 FL (ref 82–98)
METHADONE UR QL SCN>300 NG/ML: NEGATIVE
MICROSCOPIC COMMENT: NORMAL
MONOCYTES # BLD AUTO: 0.5 K/UL (ref 0.3–1)
MONOCYTES NFR BLD: 9.4 % (ref 4–15)
NEUTROPHILS # BLD AUTO: 3.1 K/UL (ref 1.8–7.7)
NEUTROPHILS NFR BLD: 61.7 % (ref 38–73)
NITRITE UR QL STRIP: NEGATIVE
NRBC BLD-RTO: 0 /100 WBC
OPIATES UR QL SCN: NEGATIVE
PCP UR QL SCN>25 NG/ML: NEGATIVE
PH UR STRIP: 6 [PH] (ref 5–8)
PHOSPHATE SERPL-MCNC: 3 MG/DL (ref 2.7–4.5)
PLATELET # BLD AUTO: 256 K/UL (ref 150–450)
PMV BLD AUTO: 11.1 FL (ref 9.2–12.9)
POCT GLUCOSE: 79 MG/DL (ref 70–110)
POTASSIUM SERPL-SCNC: 4.7 MMOL/L (ref 3.5–5.1)
PROT SERPL-MCNC: 5.5 G/DL (ref 6–8.4)
PROT UR QL STRIP: NEGATIVE
RBC # BLD AUTO: 4.05 M/UL (ref 4–5.4)
RBC #/AREA URNS AUTO: 0 /HPF (ref 0–4)
SODIUM SERPL-SCNC: 138 MMOL/L (ref 136–145)
SP GR UR STRIP: 1.02 (ref 1–1.03)
SPECIMEN SOURCE: NORMAL
SQUAMOUS #/AREA URNS AUTO: 1 /HPF
T3 SERPL-MCNC: 42 NG/DL (ref 60–180)
T4 FREE SERPL-MCNC: 0.97 NG/DL (ref 0.71–1.51)
TOXICOLOGY INFORMATION: ABNORMAL
TROPONIN I SERPL DL<=0.01 NG/ML-MCNC: <0.006 NG/ML (ref 0–0.03)
TSH SERPL DL<=0.005 MIU/L-ACNC: 4.19 UIU/ML (ref 0.4–4)
URN SPEC COLLECT METH UR: ABNORMAL
WBC # BLD AUTO: 5.09 K/UL (ref 3.9–12.7)
WBC #/AREA URNS AUTO: 2 /HPF (ref 0–5)

## 2024-01-08 PROCEDURE — G0378 HOSPITAL OBSERVATION PER HR: HCPCS

## 2024-01-08 PROCEDURE — 87502 INFLUENZA DNA AMP PROBE: CPT

## 2024-01-08 PROCEDURE — 63600175 PHARM REV CODE 636 W HCPCS: Performed by: NURSE PRACTITIONER

## 2024-01-08 PROCEDURE — 80307 DRUG TEST PRSMV CHEM ANLYZR: CPT | Performed by: NURSE PRACTITIONER

## 2024-01-08 PROCEDURE — 83735 ASSAY OF MAGNESIUM: CPT

## 2024-01-08 PROCEDURE — 93005 ELECTROCARDIOGRAM TRACING: CPT

## 2024-01-08 PROCEDURE — 99285 EMERGENCY DEPT VISIT HI MDM: CPT | Mod: 25

## 2024-01-08 PROCEDURE — 82962 GLUCOSE BLOOD TEST: CPT

## 2024-01-08 PROCEDURE — 80053 COMPREHEN METABOLIC PANEL: CPT

## 2024-01-08 PROCEDURE — 84480 ASSAY TRIIODOTHYRONINE (T3): CPT

## 2024-01-08 PROCEDURE — 84439 ASSAY OF FREE THYROXINE: CPT

## 2024-01-08 PROCEDURE — 84100 ASSAY OF PHOSPHORUS: CPT

## 2024-01-08 PROCEDURE — 93010 ELECTROCARDIOGRAM REPORT: CPT | Mod: ,,, | Performed by: INTERNAL MEDICINE

## 2024-01-08 PROCEDURE — 84484 ASSAY OF TROPONIN QUANT: CPT

## 2024-01-08 PROCEDURE — 84443 ASSAY THYROID STIM HORMONE: CPT

## 2024-01-08 PROCEDURE — 81001 URINALYSIS AUTO W/SCOPE: CPT | Mod: XB

## 2024-01-08 PROCEDURE — 83880 ASSAY OF NATRIURETIC PEPTIDE: CPT

## 2024-01-08 PROCEDURE — 85025 COMPLETE CBC W/AUTO DIFF WBC: CPT

## 2024-01-08 PROCEDURE — 93010 ELECTROCARDIOGRAM REPORT: CPT | Mod: 76,,, | Performed by: INTERNAL MEDICINE

## 2024-01-08 RX ORDER — GLUCAGON 1 MG
1 KIT INJECTION
Status: DISCONTINUED | OUTPATIENT
Start: 2024-01-08 | End: 2024-01-09 | Stop reason: HOSPADM

## 2024-01-08 RX ORDER — FLUTICASONE PROPIONATE 50 MCG
1 SPRAY, SUSPENSION (ML) NASAL DAILY
Status: DISCONTINUED | OUTPATIENT
Start: 2024-01-09 | End: 2024-01-09 | Stop reason: HOSPADM

## 2024-01-08 RX ORDER — NALOXONE HCL 0.4 MG/ML
0.02 VIAL (ML) INJECTION
Status: DISCONTINUED | OUTPATIENT
Start: 2024-01-08 | End: 2024-01-09 | Stop reason: HOSPADM

## 2024-01-08 RX ORDER — ACETAMINOPHEN 325 MG/1
650 TABLET ORAL EVERY 6 HOURS PRN
Status: DISCONTINUED | OUTPATIENT
Start: 2024-01-08 | End: 2024-01-09 | Stop reason: HOSPADM

## 2024-01-08 RX ORDER — SODIUM CHLORIDE, SODIUM LACTATE, POTASSIUM CHLORIDE, CALCIUM CHLORIDE 600; 310; 30; 20 MG/100ML; MG/100ML; MG/100ML; MG/100ML
INJECTION, SOLUTION INTRAVENOUS CONTINUOUS
Status: DISCONTINUED | OUTPATIENT
Start: 2024-01-08 | End: 2024-01-08

## 2024-01-08 RX ORDER — TALC
6 POWDER (GRAM) TOPICAL NIGHTLY PRN
Status: DISCONTINUED | OUTPATIENT
Start: 2024-01-08 | End: 2024-01-09 | Stop reason: HOSPADM

## 2024-01-08 RX ORDER — IBUPROFEN 200 MG
24 TABLET ORAL
Status: DISCONTINUED | OUTPATIENT
Start: 2024-01-08 | End: 2024-01-09 | Stop reason: HOSPADM

## 2024-01-08 RX ORDER — SODIUM CHLORIDE 0.9 % (FLUSH) 0.9 %
10 SYRINGE (ML) INJECTION EVERY 12 HOURS PRN
Status: DISCONTINUED | OUTPATIENT
Start: 2024-01-08 | End: 2024-01-09 | Stop reason: HOSPADM

## 2024-01-08 RX ORDER — ONDANSETRON 2 MG/ML
4 INJECTION INTRAMUSCULAR; INTRAVENOUS EVERY 8 HOURS PRN
Status: DISCONTINUED | OUTPATIENT
Start: 2024-01-08 | End: 2024-01-09 | Stop reason: HOSPADM

## 2024-01-08 RX ORDER — LEVOTHYROXINE SODIUM 112 UG/1
112 TABLET ORAL
Status: DISCONTINUED | OUTPATIENT
Start: 2024-01-09 | End: 2024-01-09 | Stop reason: HOSPADM

## 2024-01-08 RX ORDER — CLONAZEPAM 0.5 MG/1
0.5 TABLET ORAL DAILY PRN
Status: DISCONTINUED | OUTPATIENT
Start: 2024-01-08 | End: 2024-01-09 | Stop reason: HOSPADM

## 2024-01-08 RX ORDER — CHOLECALCIFEROL (VITAMIN D3) 25 MCG
5000 TABLET ORAL
Status: DISCONTINUED | OUTPATIENT
Start: 2024-01-10 | End: 2024-01-09 | Stop reason: HOSPADM

## 2024-01-08 RX ORDER — SODIUM CHLORIDE, SODIUM LACTATE, POTASSIUM CHLORIDE, CALCIUM CHLORIDE 600; 310; 30; 20 MG/100ML; MG/100ML; MG/100ML; MG/100ML
INJECTION, SOLUTION INTRAVENOUS CONTINUOUS
Status: ACTIVE | OUTPATIENT
Start: 2024-01-08 | End: 2024-01-09

## 2024-01-08 RX ORDER — IBUPROFEN 200 MG
16 TABLET ORAL
Status: DISCONTINUED | OUTPATIENT
Start: 2024-01-08 | End: 2024-01-09 | Stop reason: HOSPADM

## 2024-01-08 RX ORDER — LANOLIN ALCOHOL/MO/W.PET/CERES
1000 CREAM (GRAM) TOPICAL
Status: DISCONTINUED | OUTPATIENT
Start: 2024-01-10 | End: 2024-01-09 | Stop reason: HOSPADM

## 2024-01-08 RX ADMIN — SODIUM CHLORIDE, POTASSIUM CHLORIDE, SODIUM LACTATE AND CALCIUM CHLORIDE: 600; 310; 30; 20 INJECTION, SOLUTION INTRAVENOUS at 11:01

## 2024-01-08 NOTE — ED PROVIDER NOTES
Encounter Date: 2024       History     Chief Complaint   Patient presents with    Loss of Consciousness     X 2 at home while watching tv. Initial bp 50s/20s. 105/62 now      63-year-old female with a past medical history of HLD, hypothyroidism, prior bariatric surgery and prior episodes of syncope presents to the ED with  at bedside via EMS for syncope x2 at home watching TV.  Initial blood pressure was 50s/20s.   was at home and went to the bathroom and when he came back he saw the patient in the sofa chest.  No bowel/bladder incontinence or tongue lacerations to suggest seizure.  She denies any prodromal symptoms.  Currently denies any chest pain, shortness of breath, abdominal pain, nausea, vomiting, diarrhea, dysuria and hematuria.    The history is provided by the patient, medical records and the spouse.     Review of patient's allergies indicates:   Allergen Reactions    Decongestant tablet     Hydrocodone Itching     Past Medical History:   Diagnosis Date    Allergy     Anxiety     Colon polyp, hyperplastic: see colonoscopy 2018    Depression     Family history of colon cancer: father in his 60s 2016    Glucose intolerance (impaired glucose tolerance) 2012    Headache(784.0)     History of bariatric surgery 2018    Hypothyroidism     Lumbar radiculopathy 10/25/2022    Memory loss     Menopause syndrome     Mitral valve disorders(424.0) 2012    Obesity     Other and unspecified hyperlipidemia     Sleep apnea: per sleep study 2015; CPAP at 11 cm recommended 2015    Special screening for malignant neoplasms, colon 2015    Thyroid disease      Past Surgical History:   Procedure Laterality Date    APPENDECTOMY  2015    BREAST CYST ASPIRATION       SECTION, CLASSIC      COLONOSCOPY N/A 2018    Procedure: COLONOSCOPY;  Surgeon: Hansel Shell MD;  Location: [facility] ([address] [place] [address]);  Service: Endoscopy;  Laterality: N/A;    COLONOSCOPY N/A  2021    Procedure: COLONOSCOPY;  Surgeon: Martínez Amin MD;  Location: [facility] ENDO (4TH FLR);  Service: Endoscopy;  Laterality: N/A;  8/10 - LVM about cancelling procedure- sm  pt completed COVID vaccine- see Immunization record in chart-rb    EPIDURAL STEROID INJECTION INTO LUMBAR SPINE Bilateral 2023    Procedure: B/L L4-5 TFESI;  Surgeon: Woo Thomas MD;  Location: [facility] PAIN MANAGEMENT;  Service: Pain Management;  Laterality: Bilateral;  20 MINS    EPIDURAL STEROID INJECTION INTO LUMBAR SPINE Left 10/26/2023    Procedure: LT L2-3 TFESI;  Surgeon: Jorge Shipman DO;  Location: [facility] PAIN MANAGEMENT;  Service: Pain Management;  Laterality: Left;  15 mins    FRACTURE SURGERY      HERNIA REPAIR      umbilical hernia    NOSE SURGERY      SLEEVE GASTROPLASTY      TRANSFORAMINAL EPIDURAL INJECTION OF STEROID Bilateral 2022    Procedure: TFESI (B/L) L4/5;  Surgeon: Jorge Shipman DO;  Location: [facility] OR;  Service: Pain Management;  Laterality: Bilateral;     Family History   Problem Relation Age of Onset    Sleep apnea Mother     Cancer Mother         bladder sarcoma, smoking    Sleep apnea Father     Depression Father         Bipolar    Cancer Father         colon    Colon cancer Father     Depression Sister         suicide    No Known Problems Brother     No Known Problems Daughter     Cancer Maternal Grandmother         lymphoma    Heart disease Paternal Grandmother     Melanoma Neg Hx     Skin cancer Neg Hx     Breast cancer Neg Hx     Ovarian cancer Neg Hx      Social History     Tobacco Use    Smoking status: Former     Current packs/day: 0.00     Average packs/day: 0.5 packs/day for 7.0 years (3.5 ttl pk-yrs)     Types: Cigarettes     Start date: 1975     Quit date: 1982     Years since quittin.1    Smokeless tobacco: Never   Substance Use Topics    Alcohol use: Yes     Alcohol/week: 0.0 standard drinks of alcohol     Types: 2 - 4 Glasses of wine per week      Comment: weekends    Drug use: No     Review of Systems    Physical Exam     Initial Vitals [01/08/24 1646]   BP Pulse Resp Temp SpO2   105/62 72 18 98.1 °F (36.7 °C) 100 %      MAP       --         Physical Exam    Nursing note and vitals reviewed.  Constitutional: Vital signs are normal. She appears well-developed and well-nourished. She is not diaphoretic. No distress.   HENT:   Head: Normocephalic and atraumatic.   Right Ear: External ear normal.   Left Ear: External ear normal.   Eyes: EOM are normal. Right eye exhibits no discharge. Left eye exhibits no discharge.   Neck: Trachea normal. Neck supple. No thyroid mass present.   Normal range of motion.  Cardiovascular:  Normal rate, regular rhythm, normal heart sounds and intact distal pulses.     Exam reveals no gallop and no friction rub.       No murmur heard.  Pulmonary/Chest: Breath sounds normal. No respiratory distress. She has no wheezes. She has no rhonchi. She has no rales.   Abdominal: Abdomen is soft. Bowel sounds are normal. She exhibits no distension. There is no abdominal tenderness. There is no rebound and no guarding.   Musculoskeletal:         General: No tenderness or edema. Normal range of motion.      Cervical back: Normal range of motion and neck supple.     Neurological: She is alert and oriented to person, place, and time. She has normal strength. No cranial nerve deficit or sensory deficit. GCS score is 15. GCS eye subscore is 4. GCS verbal subscore is 5. GCS motor subscore is 6.   Skin: Skin is warm and dry. Capillary refill takes less than 2 seconds. No rash noted.   Psychiatric: She has a normal mood and affect.         ED Course   Procedures  Labs Reviewed   CBC W/ AUTO DIFFERENTIAL - Abnormal; Notable for the following components:       Result Value    MCH 32.6 (*)     All other components within normal limits   COMPREHENSIVE METABOLIC PANEL - Abnormal; Notable for the following components:    Calcium 8.4 (*)     Total Protein  5.5 (*)     Albumin 3.3 (*)     Alkaline Phosphatase 42 (*)     All other components within normal limits   TSH - Abnormal; Notable for the following components:    TSH 4.191 (*)     All other components within normal limits   INFLUENZA A & B BY MOLECULAR   B-TYPE NATRIURETIC PEPTIDE   TROPONIN I   T4, FREE   URINALYSIS, REFLEX TO URINE CULTURE   POCT GLUCOSE   POCT GLUCOSE MONITORING CONTINUOUS     EKG Readings: (Independently Interpreted)   60 beats per minute.  Normal sinus rhythm.  Normal axis.  No STEMI.       Imaging Results              CT Head Without Contrast (Final result)  Result time 01/08/24 21:25:24      Final result by Manuel Palacios MD (01/08/24 21:25:24)                   Impression:      No CT evidence of acute intracranial abnormality.    Generalized cerebral volume loss and chronic ischemic changes, worse when compared with remote prior study in 2012.      Electronically signed by: Manuel Palacios MD  Date:    01/08/2024  Time:    21:25               Narrative:    EXAMINATION:  CT HEAD WITHOUT CONTRAST    CLINICAL HISTORY:  Syncope, recurrent;syncope;    TECHNIQUE:  Low dose axial images were obtained through the head.  Coronal and sagittal reformations were also performed. Contrast was not administered.    COMPARISON:  MRI, 12/03/2012.  CT, 10/26/2012.    FINDINGS:  Generalized cerebral volume loss with ex vacuo dilation of the ventricles and sulci.  Patchy and confluent periventricular and subcortical white matter hypoattenuation suggestive of chronic microvascular ischemic change, though nonspecific.  These findings have progressed when compared with remote prior study in 2012.  There is a possible subcentimeter calcified meningioma overlying the right frontal lobe convexity without associated mass effect or midline shift (coronal image 67).    No evidence of acute territorial infarct, hemorrhage, mass effect, or midline shift.    Ventricles are normal in size and configuration.    No  displaced calvarial fracture.    Visualized paranasal sinuses and mastoid air cells are essentially clear.                                       X-Ray Chest AP Portable (Final result)  Result time 01/08/24 18:02:39      Final result by Sigifredo Monreal MD (01/08/24 18:02:39)                   Impression:      1. No acute cardiopulmonary process.      Electronically signed by: Sigifredo Monreal MD  Date:    01/08/2024  Time:    18:02               Narrative:    EXAMINATION:  XR CHEST AP PORTABLE    CLINICAL HISTORY:  syncope;    TECHNIQUE:  Single frontal view of the chest was performed.    COMPARISON:  09/12/2023    FINDINGS:  The cardiomediastinal silhouette is not enlarged.  There is no pleural effusion.  The trachea is midline.  The lungs are symmetrically expanded bilaterally with coarse interstitial attenuation, similar to the previous exam.  There may be a calcified granuloma fat projected over the right lower lung zone..  No large focal consolidation seen.  There is no pneumothorax.  The osseous structures are remarkable for degenerative change..                                       Medications - No data to display  Medical Decision Making  63-year-old female who appears to be in NAD presents to the ED complaining of syncope.  ABC's intact.  Initial triage vital signs are within normal limits.    Differential diagnosis includes but is not limited to cardiac arrhythmia, ACS, PE, electrolyte abnormality, anemia, thyroid pathology, vasovagal syncope    Amount and/or Complexity of Data Reviewed  Labs: ordered. Decision-making details documented in ED Course.  Radiology: ordered. Decision-making details documented in ED Course.      Additional MDM:     Well's Criteria Score:  -Clinical symptoms of DVT (leg swelling, pain with palpation) = 0.0  -PE is #1 diagnosis OR equally likely =            0.0  -Heart Rate >100 =   0.0  -Immobilization (= or > than 3 days) or surgery in the previous 4 weeks = 0.0  -Previous  DVT/PE = 0.0  -Hemoptysis =          0.0  -Malignancy =           0.0  Well's Probability Score =    0              ED Course as of 01/08/24 2142 Mon Jan 08, 2024 1813 X-Ray Chest AP Portable  No acute cardiopulmonary process. [BG]   1903 WBC: 5.09 [AC]   1903 Hemoglobin: 13.2 [AC]   1908 Influenza A & B by Molecular  Negative. [BG]   1909 CBC auto differential(!)  No leukocytosis or anemia. [BG]   1910 X-Ray Chest AP Portable  No acute cardiopulmonary process. [BG]   1948 Troponin I: <0.006  Unlikely ACS. [BG]   1948 Calcium(!): 8.4  Within normal limits when correcting for hypoalbuminemia. [BG]   1948 BNP: 25  Unremarkable [BG]   1948 TSH(!): 4.191  Will order free T4. [BG]   2019 POCT Glucose: 79  WNL. [BG]   2019 Free T4: 0.97  In the setting of an elevated TSH this is likely a subclinical hypothyroidism. [BG]   2132 CT Head Without Contrast  No CT evidence of acute intracranial abnormality. [BG]   2138 Patient will be admitted to Hospital Medicine under observation designation for further workup and evaluation of syncope.  Patient understands and agrees with the plan. [BG]      ED Course User Index  [AC] Paul Lawson,   [BG] Devon Holley MD                           Clinical Impression:  Final diagnoses:  [R55] Syncope          ED Disposition Condition    Observation                 Devon Holley MD  Resident  01/08/24 2142

## 2024-01-09 VITALS
OXYGEN SATURATION: 96 % | HEIGHT: 63 IN | TEMPERATURE: 98 F | SYSTOLIC BLOOD PRESSURE: 147 MMHG | BODY MASS INDEX: 20.38 KG/M2 | DIASTOLIC BLOOD PRESSURE: 77 MMHG | RESPIRATION RATE: 18 BRPM | WEIGHT: 115 LBS | HEART RATE: 73 BPM

## 2024-01-09 LAB
ALBUMIN SERPL BCP-MCNC: 3 G/DL (ref 3.5–5.2)
ALP SERPL-CCNC: 38 U/L (ref 55–135)
ALT SERPL W/O P-5'-P-CCNC: 14 U/L (ref 10–44)
ANION GAP SERPL CALC-SCNC: 6 MMOL/L (ref 8–16)
ASCENDING AORTA: 3.16 CM
AST SERPL-CCNC: 18 U/L (ref 10–40)
AV INDEX (PROSTH): 0.85
AV MEAN GRADIENT: 3 MMHG
AV PEAK GRADIENT: 4 MMHG
AV VALVE AREA BY VELOCITY RATIO: 2.48 CM²
AV VALVE AREA: 2.51 CM²
AV VELOCITY RATIO: 0.84
BASOPHILS # BLD AUTO: 0.07 K/UL (ref 0–0.2)
BASOPHILS NFR BLD: 1.4 % (ref 0–1.9)
BILIRUB SERPL-MCNC: 0.3 MG/DL (ref 0.1–1)
BSA FOR ECHO PROCEDURE: 1.52 M2
BUN SERPL-MCNC: 13 MG/DL (ref 8–23)
CALCIUM SERPL-MCNC: 8.2 MG/DL (ref 8.7–10.5)
CHLORIDE SERPL-SCNC: 108 MMOL/L (ref 95–110)
CO2 SERPL-SCNC: 25 MMOL/L (ref 23–29)
CREAT SERPL-MCNC: 0.7 MG/DL (ref 0.5–1.4)
CV ECHO LV RWT: 0.4 CM
DIFFERENTIAL METHOD BLD: ABNORMAL
DOP CALC AO PEAK VEL: 1 M/S
DOP CALC AO VTI: 19.79 CM
DOP CALC LVOT AREA: 3 CM2
DOP CALC LVOT DIAMETER: 1.94 CM
DOP CALC LVOT PEAK VEL: 0.84 M/S
DOP CALC LVOT STROKE VOLUME: 49.69 CM3
DOP CALCLVOT PEAK VEL VTI: 16.82 CM
E WAVE DECELERATION TIME: 303.25 MSEC
E/A RATIO: 0.79
E/E' RATIO: 6.45 M/S
ECHO LV POSTERIOR WALL: 0.74 CM (ref 0.6–1.1)
EJECTION FRACTION: 60 %
EOSINOPHIL # BLD AUTO: 0 K/UL (ref 0–0.5)
EOSINOPHIL NFR BLD: 0.8 % (ref 0–8)
ERYTHROCYTE [DISTWIDTH] IN BLOOD BY AUTOMATED COUNT: 11.7 % (ref 11.5–14.5)
EST. GFR  (NO RACE VARIABLE): >60 ML/MIN/1.73 M^2
FRACTIONAL SHORTENING: 32 % (ref 28–44)
GLUCOSE SERPL-MCNC: 88 MG/DL (ref 70–110)
HCT VFR BLD AUTO: 35.8 % (ref 37–48.5)
HGB BLD-MCNC: 12 G/DL (ref 12–16)
IMM GRANULOCYTES # BLD AUTO: 0.01 K/UL (ref 0–0.04)
IMM GRANULOCYTES NFR BLD AUTO: 0.2 % (ref 0–0.5)
INTERVENTRICULAR SEPTUM: 0.72 CM (ref 0.6–1.1)
LA MAJOR: 4.5 CM
LA MINOR: 3.53 CM
LA WIDTH: 2.58 CM
LEFT ATRIUM SIZE: 2.6 CM
LEFT ATRIUM VOLUME INDEX: 14.7 ML/M2
LEFT ATRIUM VOLUME: 22.56 CM3
LEFT INTERNAL DIMENSION IN SYSTOLE: 2.51 CM (ref 2.1–4)
LEFT VENTRICLE DIASTOLIC VOLUME INDEX: 37.42 ML/M2
LEFT VENTRICLE DIASTOLIC VOLUME: 57.25 ML
LEFT VENTRICLE MASS INDEX: 47 G/M2
LEFT VENTRICLE SYSTOLIC VOLUME INDEX: 14.7 ML/M2
LEFT VENTRICLE SYSTOLIC VOLUME: 22.45 ML
LEFT VENTRICULAR INTERNAL DIMENSION IN DIASTOLE: 3.68 CM (ref 3.5–6)
LEFT VENTRICULAR MASS: 72.12 G
LV LATERAL E/E' RATIO: 5.46 M/S
LV SEPTAL E/E' RATIO: 7.89 M/S
LYMPHOCYTES # BLD AUTO: 2 K/UL (ref 1–4.8)
LYMPHOCYTES NFR BLD: 38.4 % (ref 18–48)
MAGNESIUM SERPL-MCNC: 2.3 MG/DL (ref 1.6–2.6)
MCH RBC QN AUTO: 32.5 PG (ref 27–31)
MCHC RBC AUTO-ENTMCNC: 33.5 G/DL (ref 32–36)
MCV RBC AUTO: 97 FL (ref 82–98)
MONOCYTES # BLD AUTO: 0.5 K/UL (ref 0.3–1)
MONOCYTES NFR BLD: 8.8 % (ref 4–15)
MV PEAK A VEL: 0.9 M/S
MV PEAK E VEL: 0.71 M/S
MV STENOSIS PRESSURE HALF TIME: 87.94 MS
MV VALVE AREA P 1/2 METHOD: 2.5 CM2
NEUTROPHILS # BLD AUTO: 2.6 K/UL (ref 1.8–7.7)
NEUTROPHILS NFR BLD: 50.4 % (ref 38–73)
NRBC BLD-RTO: 0 /100 WBC
PISA TR MAX VEL: 2.57 M/S
PLATELET # BLD AUTO: 243 K/UL (ref 150–450)
PMV BLD AUTO: 10.9 FL (ref 9.2–12.9)
POTASSIUM SERPL-SCNC: 3.8 MMOL/L (ref 3.5–5.1)
PROT SERPL-MCNC: 5 G/DL (ref 6–8.4)
RA MAJOR: 3.76 CM
RA PRESSURE ESTIMATED: 3 MMHG
RA WIDTH: 2.03 CM
RBC # BLD AUTO: 3.69 M/UL (ref 4–5.4)
RIGHT VENTRICULAR END-DIASTOLIC DIMENSION: 2.54 CM
RV TB RVSP: 6 MMHG
SINUS: 2.48 CM
SODIUM SERPL-SCNC: 139 MMOL/L (ref 136–145)
STJ: 2.89 CM
TDI LATERAL: 0.13 M/S
TDI SEPTAL: 0.09 M/S
TDI: 0.11 M/S
TR MAX PG: 26 MMHG
TRICUSPID ANNULAR PLANE SYSTOLIC EXCURSION: 2.03 CM
TV REST PULMONARY ARTERY PRESSURE: 29 MMHG
WBC # BLD AUTO: 5.1 K/UL (ref 3.9–12.7)
Z-SCORE OF LEFT VENTRICULAR DIMENSION IN END DIASTOLE: -1.93
Z-SCORE OF LEFT VENTRICULAR DIMENSION IN END SYSTOLE: -0.78

## 2024-01-09 PROCEDURE — 25000003 PHARM REV CODE 250: Performed by: NURSE PRACTITIONER

## 2024-01-09 PROCEDURE — 85025 COMPLETE CBC W/AUTO DIFF WBC: CPT | Performed by: NURSE PRACTITIONER

## 2024-01-09 PROCEDURE — 83735 ASSAY OF MAGNESIUM: CPT | Performed by: NURSE PRACTITIONER

## 2024-01-09 PROCEDURE — 80053 COMPREHEN METABOLIC PANEL: CPT | Performed by: NURSE PRACTITIONER

## 2024-01-09 PROCEDURE — G0378 HOSPITAL OBSERVATION PER HR: HCPCS

## 2024-01-09 PROCEDURE — 63600175 PHARM REV CODE 636 W HCPCS: Performed by: NURSE PRACTITIONER

## 2024-01-09 RX ORDER — CETIRIZINE HYDROCHLORIDE 10 MG/1
10 TABLET ORAL DAILY
COMMUNITY

## 2024-01-09 RX ORDER — MAGNESIUM SULFATE 1 G/100ML
1 INJECTION INTRAVENOUS ONCE
Status: COMPLETED | OUTPATIENT
Start: 2024-01-09 | End: 2024-01-09

## 2024-01-09 RX ORDER — CALCIUM CARBONATE 500(1250)
2 TABLET,CHEWABLE ORAL NIGHTLY
COMMUNITY

## 2024-01-09 RX ORDER — CETIRIZINE HYDROCHLORIDE 5 MG/1
10 TABLET ORAL NIGHTLY
Status: DISCONTINUED | OUTPATIENT
Start: 2024-01-09 | End: 2024-01-09 | Stop reason: HOSPADM

## 2024-01-09 RX ORDER — VENLAFAXINE 25 MG/1
50 TABLET ORAL 3 TIMES DAILY
Status: DISCONTINUED | OUTPATIENT
Start: 2024-01-09 | End: 2024-01-09

## 2024-01-09 RX ORDER — VENLAFAXINE 25 MG/1
50 TABLET ORAL 3 TIMES DAILY
Status: DISCONTINUED | OUTPATIENT
Start: 2024-01-09 | End: 2024-01-09 | Stop reason: HOSPADM

## 2024-01-09 RX ADMIN — LEVOTHYROXINE SODIUM 112 MCG: 112 TABLET ORAL at 06:01

## 2024-01-09 RX ADMIN — VENLAFAXINE 50 MG: 25 TABLET ORAL at 09:01

## 2024-01-09 RX ADMIN — MAGNESIUM SULFATE HEPTAHYDRATE 1 G: 500 INJECTION, SOLUTION INTRAMUSCULAR; INTRAVENOUS at 03:01

## 2024-01-09 RX ADMIN — CLONAZEPAM 0.5 MG: 0.5 TABLET ORAL at 01:01

## 2024-01-09 RX ADMIN — VENLAFAXINE 50 MG: 25 TABLET ORAL at 01:01

## 2024-01-09 RX ADMIN — CETIRIZINE HYDROCHLORIDE 10 MG: 5 TABLET, FILM COATED ORAL at 01:01

## 2024-01-09 NOTE — ED NOTES
Patient identifiers for Ranjana Dominguez 63 y.o. female checked and correct.  Chief Complaint   Patient presents with    Loss of Consciousness     X 2 at home while watching tv. Initial bp 50s/20s. 105/62 now      Past Medical History:   Diagnosis Date    Allergy     Anxiety     Colon polyp, hyperplastic: see colonoscopy 8/18 8/14/2018    Depression     Family history of colon cancer: father in his 60s 5/13/2016    Glucose intolerance (impaired glucose tolerance) 7/9/2012    Headache(784.0)     History of bariatric surgery 7/2/2018    Hypothyroidism     Lumbar radiculopathy 10/25/2022    Memory loss     Menopause syndrome     Mitral valve disorders(424.0) 5/30/2012    Obesity     Other and unspecified hyperlipidemia     Sleep apnea: per sleep study June 2015; CPAP at 11 cm recommended 6/29/2015    Special screening for malignant neoplasms, colon 6/18/2015    Syncope 1/8/2024    Thyroid disease      Allergies reported:   Review of patient's allergies indicates:   Allergen Reactions    Decongestant tablet     Hydrocodone Itching         LOC: Patient is awake, alert, and aware of environment with an appropriate affect. Patient is oriented x 4 and speaking appropriately.  APPEARANCE: Patient resting comfortably and in no acute distress. Patient is clean and well groomed, patient's clothing is properly fastened.   SKIN: The skin is warm and dry. Patient has normal skin turgor and moist mucus membranes.   MUSKULOSKELETAL: Patient is moving all extremities well, no obvious deformities noted. Pulses intact.   RESPIRATORY: Airway is open and patent. Respirations are spontaneous and non-labored with normal effort and rate.  CARDIAC: Patient has a normal rate and rhythm. Normal sinus on cardiac monitor. No peripheral edema noted.   ABDOMEN: No distention noted. Soft and non-tender upon palpation.  NEUROLOGICAL:  PERRL. Facial expression is symmetrical. Hand grasps are equal bilaterally. Normal sensation in all  extremities when touched with finger.

## 2024-01-09 NOTE — PHARMACY MED REC
"  Admission Medication History     The home medication history was taken by Carolina Eugene.    You may go to "Admission" then "Reconcile Home Medications" tabs to review and/or act upon these items.     The home medication list has been updated by the Pharmacy department.   Please read ALL comments highlighted in yellow.   Please address this information as you see fit.    Feel free to contact us if you have any questions or require assistance.      The medications listed below were removed from the home medication list. Please reorder if appropriate:  Patient reports no longer taking the following medication(s):  FLUTICASONE 50 MCG/ACT NASAL SPRAY    Medications listed below were obtained from: Patient  Current Outpatient Medications on File Prior to Encounter   Medication Sig    BIOFREEZE, MENTHOL, TOP Apply topically daily as needed (Pain).    calcium carbonate (CALCIUM 500) 500 mg calcium (1,250 mg) chewable tablet Take 2 tablets by mouth every evening.    cetirizine (ZYRTEC) 10 MG tablet Take 10 mg by mouth once daily.    cholecalciferol, vitamin D3, 125 mcg (5,000 unit) capsule Take 2 capsules by mouth once daily. Take 3 x weekly.    clonazePAM (KLONOPIN) 0.5 MG tablet Take 0.5 mg by mouth every evening.    copper gluconate 2 mg Cap Take 2 mg by mouth once daily.    cyanocobalamin (VITAMIN B-12) 1000 MCG tablet Take 2 tablets by mouth once daily. Take 3 x weekly    lanolin/mineral oil/petrolatum (ARTIFICIAL TEARS OPHT) Place 2 drops into both eyes daily as needed (Dry eyes).    levothyroxine (SYNTHROID) 112 MCG tablet Take 1 tablet (112 mcg total) by mouth before breakfast.    multivit-min/iron/folic acid/K (BARIATRIC MULTIVITAMINS ORAL) Take 1 tablet by mouth once daily.    mupirocin (BACTROBAN) 2 % ointment Apply topically 3 (three) times daily.    UNABLE TO FIND Medical Marijuana - 3 puffs by mouth daily as needed for pain.    venlafaxine (EFFEXOR) 50 MG Tab Take 1 tablet by mouth 3 (three) times daily.    " naproxen (NAPROSYN) 500 MG tablet Take 500 mg by mouth 2 (two) times daily as needed (Pain).               Carolina Eugene  EXT [id number]                .

## 2024-01-09 NOTE — H&P
[facility] - Emergency Dept  [facility]  History & Physical    Patient Name: Ranjana Dominguez  MRN: [id number]  Patient Class: OP- Observation  Admission Date: 1/8/2024  Attending Physician: Whitney Xiao MD   Primary Care Provider: Malena Pinto MD         Patient information was obtained from patient, past medical records, and ER records.     Subjective:     Principal Problem:Syncope    Chief Complaint:   Chief Complaint   Patient presents with    Loss of Consciousness     X 2 at home while watching tv. Initial bp 50s/20s. 105/62 now         HPI: Ranjana Dominguez is a 63 y.o. female with a PMHx of HLD, hypothyroidism, depression, anxiety, and lumbar radiculopathy who presents to the ED for evaluation after a syncopal episode. The patient reports she was at home watching TV when the event occurred. Per patient she states her  tried to wake her up and she wasn't responding. She did wake up after several minutes and when EMS arrived they noted an initial blood pressure of 50/20s. When they attempted to stand the patient she had another syncopal event. No bowel/bladder incontinence, tongue biting, or seizure like activity. The patient denies any symptoms prior to the event including lightheadedness/dizziness, N/V, CP, SOB, or palpitations. She reports she had last eaten around lunch time (4-5hrs prior) but had been drinking several beers and taken her medical marijuana. The patient denies fever/chills, cough, abdominal pain, diarrhea, dysuria, or headache.    In the ED, VSS, afebrile. CBC unremarkable. Calcium 8.4. Total protein 5.5. Albumin 3.3. TSH 4.191, Free T4 0.97. BNP 25. Troponin <0.006. EKG shows SR, 60 bpm, no acute ischemic changes. CXR with no acute cardiopulmonary process. CTH with no CT evidence of acute intracranial abnormality. Generalized cerebral volume loss and chronic ischemic changes, worse when compared with remote prior study in 2012. Influenza A/B  negative. UA pending.    Past Medical History:   Diagnosis Date    Allergy     Anxiety     Colon polyp, hyperplastic: see colonoscopy 2018    Depression     Family history of colon cancer: father in his 60s 2016    Glucose intolerance (impaired glucose tolerance) 2012    Headache(784.0)     History of bariatric surgery 2018    Hypothyroidism     Lumbar radiculopathy 10/25/2022    Memory loss     Menopause syndrome     Mitral valve disorders(424.0) 2012    Obesity     Other and unspecified hyperlipidemia     Sleep apnea: per sleep study 2015; CPAP at 11 cm recommended 2015    Special screening for malignant neoplasms, colon 2015    Syncope 2024    Thyroid disease        Past Surgical History:   Procedure Laterality Date    APPENDECTOMY  2015    BREAST CYST ASPIRATION       SECTION, CLASSIC      COLONOSCOPY N/A 2018    Procedure: COLONOSCOPY;  Surgeon: Hansel Shell MD;  Location: [facility] ENDO (4TH FLR);  Service: Endoscopy;  Laterality: N/A;    COLONOSCOPY N/A 2021    Procedure: COLONOSCOPY;  Surgeon: Martínez Amin MD;  [facility]  Service: Endoscopy;  Laterality: N/A;  8/10 - LVM about cancelling procedure- sm  pt completed COVID vaccine- see Immunization record in chart-rb    EPIDURAL STEROID INJECTION INTO LUMBAR SPINE Bilateral 2023    Procedure: B/L L4-5 TFESI;  Surgeon: Woo Thomas MD;  Location: [facility] PAIN MANAGEMENT;  Service: Pain Management;  Laterality: Bilateral;  20 MINS    EPIDURAL STEROID INJECTION INTO LUMBAR SPINE Left 10/26/2023    Procedure: LT L2-3 TFESI;  Surgeon: Jorge Shipman DO;  Location: [facility] PAIN MANAGEMENT;  Service: Pain Management;  Laterality: Left;  15 mins    FRACTURE SURGERY      HERNIA REPAIR      umbilical hernia    NOSE SURGERY      SLEEVE GASTROPLASTY      TRANSFORAMINAL EPIDURAL INJECTION OF STEROID Bilateral 2022    Procedure: TFESI (B/L) L4/5;  Surgeon:  Jorge Shipman DO;  Location: [facility];  Service: Pain Management;  Laterality: Bilateral;       Review of patient's allergies indicates:   Allergen Reactions    Decongestant tablet     Hydrocodone Itching       Current Facility-Administered Medications on File Prior to Encounter   Medication    dextrose 5 % and 0.45 % NaCl infusion    dextrose 5 % and 0.45 % NaCl infusion    sodium chloride 0.9% flush 3 mL     Current Outpatient Medications on File Prior to Encounter   Medication Sig    cholecalciferol, vitamin D3, 125 mcg (5,000 unit) capsule Take 3 x weekly    clonazePAM (KLONOPIN) 0.5 MG tablet Take 1 tablet (0.5 mg total) by mouth once daily.    copper gluconate 2 mg Cap Take 2 mg by mouth once daily.    cyanocobalamin (VITAMIN B-12) 1000 MCG tablet Take 3 x weekly    fluticasone (FLONASE) 50 mcg/actuation nasal spray SHAKE LIQUID AND USE 2 SPRAYS(100 MCG) IN EACH NOSTRIL EVERY DAY    levothyroxine (SYNTHROID) 112 MCG tablet Take 1 tablet (112 mcg total) by mouth before breakfast.    mupirocin (BACTROBAN) 2 % ointment Apply topically 3 (three) times daily.    naproxen (NAPROSYN) 500 MG tablet Take 500 mg by mouth 2 (two) times daily.    venlafaxine (EFFEXOR) 50 MG Tab TK 1 T PO  TID     Family History       Problem Relation (Age of Onset)    Cancer Mother, Father, Maternal Grandmother    Colon cancer Father    Depression Father, Sister    Heart disease Paternal Grandmother    No Known Problems Brother, Daughter    Sleep apnea Mother, Father          Tobacco Use    Smoking status: Former     Current packs/day: 0.00     Average packs/day: 0.5 packs/day for 7.0 years (3.5 ttl pk-yrs)     Types: Cigarettes     Start date: 1975     Quit date: 1982     Years since quittin.1    Smokeless tobacco: Never   Substance and Sexual Activity    Alcohol use: Yes     Alcohol/week: 0.0 standard drinks of alcohol     Types: 2 - 4 Glasses of wine per week     Comment: weekends    Drug use: No    Sexual  activity: Yes     Partners: Male     Review of Systems   Constitutional:  Negative for appetite change, chills, diaphoresis, fatigue and fever.   HENT:  Negative for congestion, rhinorrhea and sore throat.    Eyes:  Negative for photophobia and visual disturbance.   Respiratory:  Negative for cough, shortness of breath and wheezing.    Cardiovascular:  Negative for chest pain, palpitations and leg swelling.   Gastrointestinal:  Negative for abdominal distention, abdominal pain, diarrhea, nausea and vomiting.   Genitourinary:  Negative for dysuria, frequency and hematuria.   Musculoskeletal:  Positive for back pain (chronic). Negative for myalgias and neck pain.   Skin:  Negative for color change, pallor, rash and wound.   Neurological:  Positive for syncope. Negative for dizziness, seizures, weakness and headaches.   Psychiatric/Behavioral:  Negative for agitation, confusion and hallucinations.      Objective:     Vital Signs (Most Recent):  Temp: 98.1 °F (36.7 °C) (01/08/24 1646)  Pulse: 70 (01/08/24 1917)  Resp: 14 (01/08/24 1917)  BP: 113/60 (01/08/24 1917)  SpO2: 99 % (01/08/24 1917) Vital Signs (24h Range):  Temp:  [98.1 °F (36.7 °C)] 98.1 °F (36.7 °C)  Pulse:  [66-72] 70  Resp:  [14-18] 14  SpO2:  [99 %-100 %] 99 %  BP: (105-114)/(57-62) 113/60     Weight: 52.6 kg (115 lb 15.4 oz)  Body mass index is 20.54 kg/m².     Physical Exam  Vitals and nursing note reviewed.   Constitutional:       General: She is not in acute distress.     Appearance: She is not toxic-appearing or diaphoretic.   HENT:      Head: Normocephalic and atraumatic.      Nose: Nose normal.      Mouth/Throat:      Mouth: Mucous membranes are moist.   Eyes:      Pupils: Pupils are equal, round, and reactive to light.   Cardiovascular:      Rate and Rhythm: Regular rhythm. Bradycardia present.      Pulses: Normal pulses.   Pulmonary:      Effort: Pulmonary effort is normal. No respiratory distress.      Breath sounds: No wheezing, rhonchi or  rales.   Abdominal:      General: Bowel sounds are normal. There is no distension.      Palpations: Abdomen is soft.      Tenderness: There is no abdominal tenderness. There is no guarding.   Musculoskeletal:         General: Normal range of motion.      Cervical back: Normal range of motion.      Right lower leg: No edema.      Left lower leg: No edema.   Skin:     General: Skin is warm and dry.      Capillary Refill: Capillary refill takes less than 2 seconds.   Neurological:      General: No focal deficit present.      Mental Status: She is alert and oriented to person, place, and time.      Sensory: No sensory deficit.      Motor: No weakness.   Psychiatric:         Mood and Affect: Mood normal.         Behavior: Behavior normal.              CRANIAL NERVES     CN III, IV, VI   Pupils are equal, round, and reactive to light.       Significant Labs: All pertinent labs within the past 24 hours have been reviewed.  CBC:   Recent Labs   Lab 01/08/24  1721   WBC 5.09   HGB 13.2   HCT 39.7        CMP:   Recent Labs   Lab 01/08/24  1721      K 4.7      CO2 25   GLU 97   BUN 13   CREATININE 0.9   CALCIUM 8.4*   PROT 5.5*   ALBUMIN 3.3*   BILITOT 0.2   ALKPHOS 42*   AST 19   ALT 14   ANIONGAP 10     Cardiac Markers:   Recent Labs   Lab 01/08/24  1721   BNP 25     Troponin:   Recent Labs   Lab 01/08/24  1721   TROPONINI <0.006     TSH:   Recent Labs   Lab 01/08/24  1721   TSH 4.191*       Significant Imaging: I have reviewed all pertinent imaging results/findings within the past 24 hours.  Imaging Results              CT Head Without Contrast (Final result)  Result time 01/08/24 21:25:24      Final result by Manuel Palacios MD (01/08/24 21:25:24)                   Impression:      No CT evidence of acute intracranial abnormality.    Generalized cerebral volume loss and chronic ischemic changes, worse when compared with remote prior study in 2012.      Electronically signed by: Manuel Palacios  MD  Date:    01/08/2024  Time:    21:25               Narrative:    EXAMINATION:  CT HEAD WITHOUT CONTRAST    CLINICAL HISTORY:  Syncope, recurrent;syncope;    TECHNIQUE:  Low dose axial images were obtained through the head.  Coronal and sagittal reformations were also performed. Contrast was not administered.    COMPARISON:  MRI, 12/03/2012.  CT, 10/26/2012.    FINDINGS:  Generalized cerebral volume loss with ex vacuo dilation of the ventricles and sulci.  Patchy and confluent periventricular and subcortical white matter hypoattenuation suggestive of chronic microvascular ischemic change, though nonspecific.  These findings have progressed when compared with remote prior study in 2012.  There is a possible subcentimeter calcified meningioma overlying the right frontal lobe convexity without associated mass effect or midline shift (coronal image 67).    No evidence of acute territorial infarct, hemorrhage, mass effect, or midline shift.    Ventricles are normal in size and configuration.    No displaced calvarial fracture.    Visualized paranasal sinuses and mastoid air cells are essentially clear.                                       X-Ray Chest AP Portable (Final result)  Result time 01/08/24 18:02:39      Final result by Sigifredo Monreal MD (01/08/24 18:02:39)                   Impression:      1. No acute cardiopulmonary process.      Electronically signed by: Sigifredo Monreal MD  Date:    01/08/2024  Time:    18:02               Narrative:    EXAMINATION:  XR CHEST AP PORTABLE    CLINICAL HISTORY:  syncope;    TECHNIQUE:  Single frontal view of the chest was performed.    COMPARISON:  09/12/2023    FINDINGS:  The cardiomediastinal silhouette is not enlarged.  There is no pleural effusion.  The trachea is midline.  The lungs are symmetrically expanded bilaterally with coarse interstitial attenuation, similar to the previous exam.  There may be a calcified granuloma fat projected over the right lower lung  zone..  No large focal consolidation seen.  There is no pneumothorax.  The osseous structures are remarkable for degenerative change..                                      Assessment/Plan:     * Syncope  -Labs largely unremarkable. BNP and trop WNL.  -TSH 4.191, Free T4 0.97, improved from previous. Synthroid increased last week.  -EKG with no acute ischemic changes.  -Orthostatics negative.  -CXR negative.  -CTH negative.  -UA non infectious. UDS +THC, urine alcohol 20  -Cardiac monitoring.  -2D Echocardiogram pending.   -Carotid doppler U/S pending.   -Fall precautions.     Lumbar radiculopathy  -Chronic issue. Receives epidural steroid injections.  - reviewed, patient is prescribed medical marijuna outpatient.  -PRN tylenol.    Acquired hypothyroidism  Patient has chronic hypothyroidism. TFTs reviewed-   Lab Results   Component Value Date    TSH 4.191 (H) 01/08/2024   . Will continue chronic levothyroxine and adjust for and clinical changes.    Anxiety  -Chronic issue.  -Continue effexor and PRN clonazepam.      VTE Risk Mitigation (From admission, onward)           Ordered     IP VTE LOW RISK PATIENT  Once         01/08/24 2154     Place sequential compression device  Until discontinued         01/08/24 2154                         On 01/08/2024, patient should be placed in hospital observation services under my care in collaboration with Herman Pearson MD.           Cristy Martinez NP  Department of Hospital Medicine  [facility] - Emergency Dept

## 2024-01-09 NOTE — ASSESSMENT & PLAN NOTE
-Chronic issue. Receives epidural steroid injections.  - reviewed, patient is prescribed medical marijuna outpatient.  -PRN tylenol.

## 2024-01-09 NOTE — ASSESSMENT & PLAN NOTE
-Labs largely unremarkable. BNP and trop WNL.  -TSH 4.191, Free T4 0.97, improved from previous. Synthroid increased last week.  -EKG with no acute ischemic changes.  -Orthostatics negative.  -CXR negative.  -CTH negative.  -UA non infectious. UDS +THC, urine alcohol 20  -Cardiac monitoring.  -2D Echocardiogram pending.   -Carotid doppler U/S pending.   -Fall precautions.

## 2024-01-09 NOTE — NURSING
Went over discharge instructions with patient.   Stressed importance of making and keeping all follow ups.   Patient verbalized understanding and has no questions in regards to discharge.  IV removed, catheter intact.  Patient dressed and awaiting ride.

## 2024-01-09 NOTE — HOSPITAL COURSE
Ranjana Dominguez is placed in  Observation for management of syncope. Per EMS report blood pressures were 50s/20s on their arrival. Pressures had improved on arrival to ED. EKG without ischemic changes. CXR without acute process. CTH without evidence of acute intracranial abnormality. Orthostatics negative. Received IVF overnight. Patient symptomatically improving. Echo with EF 60%, normal diastolic function, CVP 3. Bilateral carotid US without evidence of hemodynamically significant carotid bifurcation stenosis. Patient symptomatically improving. Counseled to increase oral hydration and cessation of marijuana. Patient will follow up with PCP. Return precautions provided. Patient medically ready for discharge. Plan of care discussed with patient, patient agreeable to plan, and all questions answered.

## 2024-01-09 NOTE — ED TRIAGE NOTES
Patient comes into the emergency department by EMS with complaints of LOC. Per family member, patient was at home watching tv when he tried to wake her up and the patient was none responsive. Upon EMS arrival, the pt's blood pressure was 50/20. Pt states EMS did a repeat blood pressure and got the same reading. Pt also states that EMS personnel stood her up and the pt collapsed. Patient denies HA, dizziness, nausea, vomiting, diarrhea, blurred vision, SOB and chest pain.

## 2024-01-09 NOTE — HPI
Ranjana Dominguez is a 63 y.o. female with a PMHx of HLD, hypothyroidism, depression, anxiety, and lumbar radiculopathy who presents to the ED for evaluation after a syncopal episode. The patient reports she was at home watching TV when the event occurred. Per patient she states her  tried to wake her up and she wasn't responding. She did wake up after several minutes and when EMS arrived they noted an initial blood pressure of 50/20s. When they attempted to stand the patient she had another syncopal event. No bowel/bladder incontinence, tongue biting, or seizure like activity. The patient denies any symptoms prior to the event including lightheadedness/dizziness, N/V, CP, SOB, or palpitations. She reports she had last eaten around lunch time (4-5hrs prior) but had been drinking several beers and taken her medical marijuana. The patient denies fever/chills, cough, abdominal pain, diarrhea, dysuria, or headache.    In the ED, VSS, afebrile. CBC unremarkable. Calcium 8.4. Total protein 5.5. Albumin 3.3. TSH 4.191, Free T4 0.97. BNP 25. Troponin <0.006. EKG shows SR, 60 bpm, no acute ischemic changes. CXR with no acute cardiopulmonary process. CTH with no CT evidence of acute intracranial abnormality. Generalized cerebral volume loss and chronic ischemic changes, worse when compared with remote prior study in 2012. Influenza A/B negative. UA pending.

## 2024-01-09 NOTE — ASSESSMENT & PLAN NOTE
Patient has chronic hypothyroidism. TFTs reviewed-   Lab Results   Component Value Date    TSH 4.191 (H) 01/08/2024   . Will continue chronic levothyroxine and adjust for and clinical changes.

## 2024-01-09 NOTE — SUBJECTIVE & OBJECTIVE
Past Medical History:   Diagnosis Date    Allergy     Anxiety     Colon polyp, hyperplastic: see colonoscopy 2018    Depression     Family history of colon cancer: father in his 60s 2016    Glucose intolerance (impaired glucose tolerance) 2012    Headache(784.0)     History of bariatric surgery 2018    Hypothyroidism     Lumbar radiculopathy 10/25/2022    Memory loss     Menopause syndrome     Mitral valve disorders(424.0) 2012    Obesity     Other and unspecified hyperlipidemia     Sleep apnea: per sleep study 2015; CPAP at 11 cm recommended 2015    Special screening for malignant neoplasms, colon 2015    Syncope 2024    Thyroid disease        Past Surgical History:   Procedure Laterality Date    APPENDECTOMY  2015    BREAST CYST ASPIRATION       SECTION, CLASSIC      COLONOSCOPY N/A 2018    Procedure: COLONOSCOPY;  Surgeon: Hansel Shell MD;  Location: [facility] ENDO (4TH FLR);  Service: Endoscopy;  Laterality: N/A;    COLONOSCOPY N/A 2021    Procedure: COLONOSCOPY;  Surgeon: Martínez Amin MD;  Location: [facility] ENDO (4TH FLR);  Service: Endoscopy;  Laterality: N/A;  8/10 - LVM about cancelling procedure- sm  pt completed COVID vaccine- see Immunization record in chart-rb    EPIDURAL STEROID INJECTION INTO LUMBAR SPINE Bilateral 2023    Procedure: B/L L4-5 TFESI;  Surgeon: Woo Thomas MD;  Location: [facility];  Service: Pain Management;  Laterality: Bilateral;  20 MINS    EPIDURAL STEROID INJECTION INTO LUMBAR SPINE Left 10/26/2023    Procedure: LT L2-3 TFESI;  Surgeon: Jorge Shipman DO;  Location: [facility];  Service: Pain Management;  Laterality: Left;  15 mins    FRACTURE SURGERY      HERNIA REPAIR      umbilical hernia    NOSE SURGERY      SLEEVE GASTROPLASTY      TRANSFORAMINAL EPIDURAL INJECTION OF STEROID Bilateral 2022    Procedure: TFESI (B/L) L4/5;  Surgeon: Jorge Shipman DO;   Location: [facility] OR;  Service: Pain Management;  Laterality: Bilateral;       Review of patient's allergies indicates:   Allergen Reactions    Decongestant tablet     Hydrocodone Itching       Current Facility-Administered Medications on File Prior to Encounter   Medication    dextrose 5 % and 0.45 % NaCl infusion    dextrose 5 % and 0.45 % NaCl infusion    sodium chloride 0.9% flush 3 mL     Current Outpatient Medications on File Prior to Encounter   Medication Sig    cholecalciferol, vitamin D3, 125 mcg (5,000 unit) capsule Take 3 x weekly    clonazePAM (KLONOPIN) 0.5 MG tablet Take 1 tablet (0.5 mg total) by mouth once daily.    copper gluconate 2 mg Cap Take 2 mg by mouth once daily.    cyanocobalamin (VITAMIN B-12) 1000 MCG tablet Take 3 x weekly    fluticasone (FLONASE) 50 mcg/actuation nasal spray SHAKE LIQUID AND USE 2 SPRAYS(100 MCG) IN EACH NOSTRIL EVERY DAY    levothyroxine (SYNTHROID) 112 MCG tablet Take 1 tablet (112 mcg total) by mouth before breakfast.    mupirocin (BACTROBAN) 2 % ointment Apply topically 3 (three) times daily.    naproxen (NAPROSYN) 500 MG tablet Take 500 mg by mouth 2 (two) times daily.    venlafaxine (EFFEXOR) 50 MG Tab TK 1 T PO  TID     Family History       Problem Relation (Age of Onset)    Cancer Mother, Father, Maternal Grandmother    Colon cancer Father    Depression Father, Sister    Heart disease Paternal Grandmother    No Known Problems Brother, Daughter    Sleep apnea Mother, Father          Tobacco Use    Smoking status: Former     Current packs/day: 0.00     Average packs/day: 0.5 packs/day for 7.0 years (3.5 ttl pk-yrs)     Types: Cigarettes     Start date: 1975     Quit date: 1982     Years since quittin.1    Smokeless tobacco: Never   Substance and Sexual Activity    Alcohol use: Yes     Alcohol/week: 0.0 standard drinks of alcohol     Types: 2 - 4 Glasses of wine per week     Comment: weekends    Drug use: No    Sexual activity: Yes     Partners: Male      Review of Systems   Constitutional:  Negative for appetite change, chills, diaphoresis, fatigue and fever.   HENT:  Negative for congestion, rhinorrhea and sore throat.    Eyes:  Negative for photophobia and visual disturbance.   Respiratory:  Negative for cough, shortness of breath and wheezing.    Cardiovascular:  Negative for chest pain, palpitations and leg swelling.   Gastrointestinal:  Negative for abdominal distention, abdominal pain, diarrhea, nausea and vomiting.   Genitourinary:  Negative for dysuria, frequency and hematuria.   Musculoskeletal:  Positive for back pain (chronic). Negative for myalgias and neck pain.   Skin:  Negative for color change, pallor, rash and wound.   Neurological:  Positive for syncope. Negative for dizziness, seizures, weakness and headaches.   Psychiatric/Behavioral:  Negative for agitation, confusion and hallucinations.      Objective:     Vital Signs (Most Recent):  Temp: 98.1 °F (36.7 °C) (01/08/24 1646)  Pulse: 70 (01/08/24 1917)  Resp: 14 (01/08/24 1917)  BP: 113/60 (01/08/24 1917)  SpO2: 99 % (01/08/24 1917) Vital Signs (24h Range):  Temp:  [98.1 °F (36.7 °C)] 98.1 °F (36.7 °C)  Pulse:  [66-72] 70  Resp:  [14-18] 14  SpO2:  [99 %-100 %] 99 %  BP: (105-114)/(57-62) 113/60     Weight: 52.6 kg (115 lb 15.4 oz)  Body mass index is 20.54 kg/m².     Physical Exam  Vitals and nursing note reviewed.   Constitutional:       General: She is not in acute distress.     Appearance: She is not toxic-appearing or diaphoretic.   HENT:      Head: Normocephalic and atraumatic.      Nose: Nose normal.      Mouth/Throat:      Mouth: Mucous membranes are moist.   Eyes:      Pupils: Pupils are equal, round, and reactive to light.   Cardiovascular:      Rate and Rhythm: Regular rhythm. Bradycardia present.      Pulses: Normal pulses.   Pulmonary:      Effort: Pulmonary effort is normal. No respiratory distress.      Breath sounds: No wheezing, rhonchi or rales.   Abdominal:      General:  Bowel sounds are normal. There is no distension.      Palpations: Abdomen is soft.      Tenderness: There is no abdominal tenderness. There is no guarding.   Musculoskeletal:         General: Normal range of motion.      Cervical back: Normal range of motion.      Right lower leg: No edema.      Left lower leg: No edema.   Skin:     General: Skin is warm and dry.      Capillary Refill: Capillary refill takes less than 2 seconds.   Neurological:      General: No focal deficit present.      Mental Status: She is alert and oriented to person, place, and time.      Sensory: No sensory deficit.      Motor: No weakness.   Psychiatric:         Mood and Affect: Mood normal.         Behavior: Behavior normal.              CRANIAL NERVES     CN III, IV, VI   Pupils are equal, round, and reactive to light.       Significant Labs: All pertinent labs within the past 24 hours have been reviewed.  CBC:   Recent Labs   Lab 01/08/24  1721   WBC 5.09   HGB 13.2   HCT 39.7        CMP:   Recent Labs   Lab 01/08/24  1721      K 4.7      CO2 25   GLU 97   BUN 13   CREATININE 0.9   CALCIUM 8.4*   PROT 5.5*   ALBUMIN 3.3*   BILITOT 0.2   ALKPHOS 42*   AST 19   ALT 14   ANIONGAP 10     Cardiac Markers:   Recent Labs   Lab 01/08/24  1721   BNP 25     Troponin:   Recent Labs   Lab 01/08/24  1721   TROPONINI <0.006     TSH:   Recent Labs   Lab 01/08/24  1721   TSH 4.191*       Significant Imaging: I have reviewed all pertinent imaging results/findings within the past 24 hours.  Imaging Results              CT Head Without Contrast (Final result)  Result time 01/08/24 21:25:24      Final result by Manuel Palacios MD (01/08/24 21:25:24)                   Impression:      No CT evidence of acute intracranial abnormality.    Generalized cerebral volume loss and chronic ischemic changes, worse when compared with remote prior study in 2012.      Electronically signed by: Manuel Palacios MD  Date:    01/08/2024  Time:    21:25                Narrative:    EXAMINATION:  CT HEAD WITHOUT CONTRAST    CLINICAL HISTORY:  Syncope, recurrent;syncope;    TECHNIQUE:  Low dose axial images were obtained through the head.  Coronal and sagittal reformations were also performed. Contrast was not administered.    COMPARISON:  MRI, 12/03/2012.  CT, 10/26/2012.    FINDINGS:  Generalized cerebral volume loss with ex vacuo dilation of the ventricles and sulci.  Patchy and confluent periventricular and subcortical white matter hypoattenuation suggestive of chronic microvascular ischemic change, though nonspecific.  These findings have progressed when compared with remote prior study in 2012.  There is a possible subcentimeter calcified meningioma overlying the right frontal lobe convexity without associated mass effect or midline shift (coronal image 67).    No evidence of acute territorial infarct, hemorrhage, mass effect, or midline shift.    Ventricles are normal in size and configuration.    No displaced calvarial fracture.    Visualized paranasal sinuses and mastoid air cells are essentially clear.                                       X-Ray Chest AP Portable (Final result)  Result time 01/08/24 18:02:39      Final result by Sigifredo Monreal MD (01/08/24 18:02:39)                   Impression:      1. No acute cardiopulmonary process.      Electronically signed by: Sigifredo Monreal MD  Date:    01/08/2024  Time:    18:02               Narrative:    EXAMINATION:  XR CHEST AP PORTABLE    CLINICAL HISTORY:  syncope;    TECHNIQUE:  Single frontal view of the chest was performed.    COMPARISON:  09/12/2023    FINDINGS:  The cardiomediastinal silhouette is not enlarged.  There is no pleural effusion.  The trachea is midline.  The lungs are symmetrically expanded bilaterally with coarse interstitial attenuation, similar to the previous exam.  There may be a calcified granuloma fat projected over the right lower lung zone..  No large focal consolidation seen.   There is no pneumothorax.  The osseous structures are remarkable for degenerative change..

## 2024-01-09 NOTE — DISCHARGE SUMMARY
[facility] - Emergency Dept  [facility] Medicine  Discharge Summary      Patient Name: Ranjana Dominguez  MRN: [id number]  LANCE: [id number]  Patient Class: OP- Observation  Admission Date: 1/8/2024  Hospital Length of Stay: 0 days  Discharge Date and Time:  01/09/2024 1:44 PM  Attending Physician: Whitney Xiao MD   Discharging Provider: Crispin Mead PA-C  Primary Care Provider: Malena Pinto MD  Hospital Medicine Team: [facility] HOSP MED F Crispin Mead PA-C  Primary Care Team: [facility] HOSP MED F    HPI:   Ranjana Dominguez is a 63 y.o. female with a PMHx of HLD, hypothyroidism, depression, anxiety, and lumbar radiculopathy who presents to the ED for evaluation after a syncopal episode. The patient reports she was at home watching TV when the event occurred. Per patient she states her  tried to wake her up and she wasn't responding. She did wake up after several minutes and when EMS arrived they noted an initial blood pressure of 50/20s. When they attempted to stand the patient she had another syncopal event. No bowel/bladder incontinence, tongue biting, or seizure like activity. The patient denies any symptoms prior to the event including lightheadedness/dizziness, N/V, CP, SOB, or palpitations. She reports she had last eaten around lunch time (4-5hrs prior) but had been drinking several beers and taken her medical marijuana. The patient denies fever/chills, cough, abdominal pain, diarrhea, dysuria, or headache.    In the ED, VSS, afebrile. CBC unremarkable. Calcium 8.4. Total protein 5.5. Albumin 3.3. TSH 4.191, Free T4 0.97. BNP 25. Troponin <0.006. EKG shows SR, 60 bpm, no acute ischemic changes. CXR with no acute cardiopulmonary process. CTH with no CT evidence of acute intracranial abnormality. Generalized cerebral volume loss and chronic ischemic changes, worse when compared with remote prior study in 2012. Influenza A/B negative. UA pending.    * No surgery found *       Hospital Course:   Ranjana Dominguez is placed in  Observation for management of syncope. Per EMS report blood pressures were 50s/20s on their arrival. Pressures had improved on arrival to ED. EKG without ischemic changes. CXR without acute process. CTH without evidence of acute intracranial abnormality. Orthostatics negative. Received IVF overnight. Patient symptomatically improving. Echo with EF 60%, normal diastolic function, CVP 3. Bilateral carotid US without evidence of hemodynamically significant carotid bifurcation stenosis. Patient symptomatically improving. Counseled to increase oral hydration and cessation of marijuana. Patient will follow up with PCP. Return precautions provided. Patient medically ready for discharge. Plan of care discussed with patient, patient agreeable to plan, and all questions answered.       Goals of Care Treatment Preferences:  Code Status: Full Code      Consults:     No new Assessment & Plan notes have been filed under this hospital service since the last note was generated.  Service: Hospital Medicine    Final Active Diagnoses:    Diagnosis Date Noted POA    PRINCIPAL PROBLEM:  Syncope [R55] 01/08/2024 Yes    Lumbar radiculopathy [M54.16] 10/25/2022 Yes    Acquired hypothyroidism [E03.9] 07/09/2012 Yes    Anxiety [F41.9]  Yes      Problems Resolved During this Admission:       Discharged Condition: stable    Disposition: Home or Self Care    Follow Up:   Follow-up Information       Malena Pinto MD Follow up in 1 week(s).    Specialty: Internal Medicine  Contact information:  [address]  [facility] [address]  [phone]                           Patient Instructions:      Diet Cardiac     Notify your health care provider if you experience any of the following:  difficulty breathing or increased cough     Notify your health care provider if you experience any of the following:  severe persistent headache     Notify your health care provider if  you experience any of the following:  increased confusion or weakness     Notify your health care provider if you experience any of the following:  persistent dizziness, light-headedness, or visual disturbances     Notify your health care provider if you experience any of the following:  severe uncontrolled pain     Activity as tolerated       Significant Diagnostic Studies: N/A    Pending Diagnostic Studies:       None           Medications:  Reconciled Home Medications:      Medication List        CHANGE how you take these medications      cholecalciferol (vitamin D3) 125 mcg (5,000 unit) capsule  Take 3 x weekly  What changed:   how much to take  how to take this  when to take this     clonazePAM 0.5 MG tablet  Commonly known as: KlonoPIN  Take 1 tablet (0.5 mg total) by mouth once daily.  What changed: when to take this     cyanocobalamin 1000 MCG tablet  Commonly known as: VITAMIN B-12  Take 3 x weekly  What changed:   how much to take  how to take this  when to take this            CONTINUE taking these medications      ARTIFICIAL TEARS OPHT  Place 2 drops into both eyes daily as needed (Dry eyes).     BARIATRIC MULTIVITAMINS ORAL  Take 1 tablet by mouth once daily.     BIOFREEZE (MENTHOL) TOP  Apply topically daily as needed (Pain).     CALCIUM 500 500 mg calcium (1,250 mg) chewable tablet  Generic drug: calcium carbonate  Take 2 tablets by mouth every evening.     cetirizine 10 MG tablet  Commonly known as: ZYRTEC  Take 10 mg by mouth once daily.     copper gluconate 2 mg Cap  Take 2 mg by mouth once daily.     levothyroxine 112 MCG tablet  Commonly known as: SYNTHROID  Take 1 tablet (112 mcg total) by mouth before breakfast.     mupirocin 2 % ointment  Commonly known as: BACTROBAN  Apply topically 3 (three) times daily.     naproxen 500 MG tablet  Commonly known as: NAPROSYN  Take 500 mg by mouth 2 (two) times daily as needed (Pain).     UNABLE TO FIND  Medical Marijuana - 3 puffs by mouth daily as needed  for pain.     venlafaxine 50 MG Tab  Commonly known as: EFFEXOR  TK 1 T PO  TID              Indwelling Lines/Drains at time of discharge:   Lines/Drains/Airways       None                   Time spent on the discharge of patient: 33 minutes         MANDA BritoC  Department of Hospital Medicine  [facility] - Emergency Dept

## 2024-01-10 ENCOUNTER — PATIENT OUTREACH (OUTPATIENT)
Dept: ADMINISTRATIVE | Facility: CLINIC | Age: 64
End: 2024-01-10
Payer: COMMERCIAL

## 2024-01-10 LAB — COPPER SERPL-MCNC: 732 UG/L (ref 810–1990)

## 2024-01-10 NOTE — PROGRESS NOTES
C3 nurse attempted to contact Ranjana Dominguez for a TCC post hospital discharge follow up call. No answer. Left voicemail with callback information. The patient does not have a scheduled HOSFU appointment. Message sent to PCP staff for assistance with scheduling visit with patient.

## 2024-01-10 NOTE — PROGRESS NOTES
C3 nurse spoke with Ranjana Dominguez for a TCC post hospital discharge follow up call. The patient has a scheduled [facility] appointment with Malena Pinto on 01/12/24 @ 2250.

## 2024-01-12 ENCOUNTER — OFFICE VISIT (OUTPATIENT)
Dept: INTERNAL MEDICINE | Facility: CLINIC | Age: 64
End: 2024-01-12
Payer: COMMERCIAL

## 2024-01-12 VITALS
OXYGEN SATURATION: 98 % | SYSTOLIC BLOOD PRESSURE: 100 MMHG | HEART RATE: 90 BPM | DIASTOLIC BLOOD PRESSURE: 62 MMHG | BODY MASS INDEX: 21.05 KG/M2 | WEIGHT: 118.81 LBS | HEIGHT: 63 IN

## 2024-01-12 DIAGNOSIS — E03.9 ACQUIRED HYPOTHYROIDISM: ICD-10-CM

## 2024-01-12 DIAGNOSIS — R55 SYNCOPE, UNSPECIFIED SYNCOPE TYPE: Primary | ICD-10-CM

## 2024-01-12 DIAGNOSIS — Z98.84 HISTORY OF BARIATRIC SURGERY: ICD-10-CM

## 2024-01-12 DIAGNOSIS — F33.0 MILD EPISODE OF RECURRENT MAJOR DEPRESSIVE DISORDER: ICD-10-CM

## 2024-01-12 PROCEDURE — 3008F BODY MASS INDEX DOCD: CPT | Mod: CPTII,S$GLB,, | Performed by: INTERNAL MEDICINE

## 2024-01-12 PROCEDURE — 99999 PR PBB SHADOW E&M-EST. PATIENT-LVL V: CPT | Mod: PBBFAC,,, | Performed by: INTERNAL MEDICINE

## 2024-01-12 PROCEDURE — 3074F SYST BP LT 130 MM HG: CPT | Mod: CPTII,S$GLB,, | Performed by: INTERNAL MEDICINE

## 2024-01-12 PROCEDURE — 99215 OFFICE O/P EST HI 40 MIN: CPT | Mod: S$GLB,,, | Performed by: INTERNAL MEDICINE

## 2024-01-12 PROCEDURE — 1159F MED LIST DOCD IN RCRD: CPT | Mod: CPTII,S$GLB,, | Performed by: INTERNAL MEDICINE

## 2024-01-12 PROCEDURE — 3078F DIAST BP <80 MM HG: CPT | Mod: CPTII,S$GLB,, | Performed by: INTERNAL MEDICINE

## 2024-01-12 NOTE — PROGRESS NOTES
" Patient ID: Ranjana Dominguez is a 63 y.o. female.    Chief Complaint: Hospital Follow Up      Assessment:       1. Syncope, unspecified syncope type    2. Mild episode of recurrent major depressive disorder    3. Acquired hypothyroidism    4. History of bariatric surgery: sleeve 2017          Plan:           1. Syncope, unspecified syncope type  -     Holter monitor - 48 hour; Future  -     Ambulatory referral/consult to Cardiology; Future; Expected date: 01/19/2024    2. Mild episode of recurrent major depressive disorder    3. Acquired hypothyroidism    4. History of bariatric surgery: sleeve 2017     Curtail/eliminate ETOH and marijuana  Optimize thyroid  Hydration; hygienic measures reviewed  911/ER cautions discussed  I will review all studies and determine further tx depending on findings    Subjective:   ER 1/8/24.   is with her today.    Pt from home with hypotensive episode after several minutes of syncope while sitting on the couch. No orthostasis, no change in meds, nothing abnormal about her day. Has intermittent diarrhea but none more than usual and not a significant amount today. No vomiting. No fever. Does not "drink as much as she should" as far as fluid intake but no significant dehydration recently.  No new meds. No meds that cause orthostasis on my review. 50s/20s for medics initially but given 500cc fluid and much improved on arrival, no hypotension, no neuro deficit, no murmur, no other significant physical exam findings. Source of syncope unclear. CTH and cardiac evaluation ordered - admission for telemetry monitoring and cardiac syncope work up.    EMT brought her in and BP was 50 systolic!        See notes from hospital stay.  In the ED, VSS, afebrile. CBC unremarkable. Calcium 8.4. Total protein 5.5. Albumin 3.3. TSH 4.191, Free T4 0.97. BNP 25. Troponin <0.006. EKG shows SR, 60 bpm, no acute ischemic changes. CXR with no acute cardiopulmonary process. CTH with no CT " evidence of acute intracranial abnormality. Generalized cerebral volume loss and chronic ischemic changes, worse when compared with remote prior study in 2012. Influenza A/B negative. UA pending.     * No surgery found *       Hospital Course:   Ranjana Dominguez is placed in  Observation for management of syncope. Per EMS report blood pressures were 50s/20s on their arrival. Pressures had improved on arrival to ED. EKG without ischemic changes. CXR without acute process. CTH without evidence of acute intracranial abnormality. Orthostatics negative. Received IVF overnight. Patient symptomatically improving. Echo with EF 60%, normal diastolic function, CVP 3. Bilateral carotid US without evidence of hemodynamically significant carotid bifurcation stenosis. Patient symptomatically improving. Counseled to increase oral hydration and cessation of marijuana. Patient will follow up with PCP. Return precautions provided. Patient medically ready for discharge. Plan of care discussed with patient, patient agreeable to plan, and all questions answered.         No further sx since then.  Occasional marijuana and alcohol (beer) no more than usual on this occasion.    Patient Active Problem List   Diagnosis    Anxiety    Glucose intolerance (impaired glucose tolerance)    Mixed hyperlipidemia    Acquired hypothyroidism    Family history of colon cancer: father in his 60s    History of bariatric surgery: sleeve 2017    Colon polyp, hyperplastic 2018; adenoma 2021 repeat 3 years    Osteopenia of multiple sites: see DEXA 7/21; stable 10/23    Diverticulosis: see colonoscopy 2021    Lumbar radiculopathy    Copper deficiency    Iron excess    Hemochromatosis carrier: heterozygous for H63D (10/23)    Mild episode of recurrent major depressive disorder              Review of Systems   Constitutional:  Negative for fatigue.   HENT:  Negative for hearing loss.    Eyes:  Negative for visual disturbance.   Respiratory:   Negative for shortness of breath.    Cardiovascular:  Negative for chest pain.   Gastrointestinal:  Negative for abdominal pain, constipation and diarrhea.   Genitourinary:  Negative for dysuria, frequency and vaginal bleeding.   Musculoskeletal:  Negative for joint swelling.   Skin:  Negative for rash.   Neurological:  Positive for syncope. Negative for weakness, light-headedness and headaches.        No further sx, see HPI   Psychiatric/Behavioral:  Negative for sleep disturbance.          Objective:      Physical Exam  Vitals and nursing note reviewed.   Constitutional:       Appearance: She is well-developed.   HENT:      Head: Normocephalic and atraumatic.      Right Ear: External ear normal.      Left Ear: External ear normal.      Nose: Nose normal.      Mouth/Throat:      Pharynx: No oropharyngeal exudate.   Eyes:      General: No scleral icterus.     Extraocular Movements: Extraocular movements intact.      Conjunctiva/sclera: Conjunctivae normal.   Neck:      Thyroid: No thyromegaly.      Vascular: No JVD.   Cardiovascular:      Rate and Rhythm: Normal rate and regular rhythm.      Heart sounds: Normal heart sounds. No murmur heard.     No gallop.   Pulmonary:      Effort: Pulmonary effort is normal. No respiratory distress.      Breath sounds: Normal breath sounds. No wheezing.   Abdominal:      General: Bowel sounds are normal. There is no distension.      Palpations: Abdomen is soft. There is no mass.      Tenderness: There is no abdominal tenderness. There is no guarding or rebound.   Musculoskeletal:         General: No tenderness. Normal range of motion.      Cervical back: Normal range of motion and neck supple.   Lymphadenopathy:      Cervical: No cervical adenopathy.   Skin:     General: Skin is warm.      Findings: No erythema or rash.   Neurological:      General: No focal deficit present.      Mental Status: She is alert and oriented to person, place, and time.      Cranial Nerves: No cranial  nerve deficit.      Coordination: Coordination normal.   Psychiatric:         Behavior: Behavior normal.         Thought Content: Thought content normal.         Judgment: Judgment normal.             Health Maintenance Due   Topic Date Due    RSV Vaccine (Age 60+ and Pregnant patients) (1 - 1-dose 60+ series) Never done    COVID-19 Vaccine (6 - 2023-24 season) 09/01/2023    Mammogram  01/05/2024

## 2024-01-17 ENCOUNTER — HOSPITAL ENCOUNTER (OUTPATIENT)
Dept: CARDIOLOGY | Facility: HOSPITAL | Age: 64
Discharge: HOME | End: 2024-01-17
Attending: INTERNAL MEDICINE
Payer: COMMERCIAL

## 2024-01-17 DIAGNOSIS — R55 SYNCOPE, UNSPECIFIED SYNCOPE TYPE: ICD-10-CM

## 2024-01-17 PROCEDURE — 93227 XTRNL ECG REC<48 HR R&I: CPT | Mod: ,,, | Performed by: INTERNAL MEDICINE

## 2024-01-17 PROCEDURE — 93226 XTRNL ECG REC<48 HR SCAN A/R: CPT | Mod: PO

## 2024-01-22 LAB
OHS CV EVENT MONITOR DAY: 0
OHS CV HOLTER LENGTH DECIMAL HOURS: 47.98
OHS CV HOLTER LENGTH HOURS: 47
OHS CV HOLTER LENGTH MINUTES: 59
OHS CV HOLTER SINUS AVERAGE HR: 76
OHS CV HOLTER SINUS MAX HR: 119
OHS CV HOLTER SINUS MIN HR: 47

## 2024-01-23 ENCOUNTER — PATIENT MESSAGE (OUTPATIENT)
Dept: INTERNAL MEDICINE | Facility: CLINIC | Age: 64
End: 2024-01-23
Payer: COMMERCIAL

## 2024-02-12 ENCOUNTER — HOSPITAL ENCOUNTER (OUTPATIENT)
Dept: RADIOLOGY | Facility: HOSPITAL | Age: 64
Discharge: HOME | End: 2024-02-12
Attending: INTERNAL MEDICINE
Payer: COMMERCIAL

## 2024-02-12 DIAGNOSIS — Z12.31 SCREENING MAMMOGRAM, ENCOUNTER FOR: ICD-10-CM

## 2024-02-12 PROCEDURE — 77067 SCR MAMMO BI INCL CAD: CPT | Mod: 26,,, | Performed by: RADIOLOGY

## 2024-02-12 PROCEDURE — 77063 BREAST TOMOSYNTHESIS BI: CPT | Mod: 26,,, | Performed by: RADIOLOGY

## 2024-02-12 PROCEDURE — 77067 SCR MAMMO BI INCL CAD: CPT | Mod: TC,PO

## 2024-02-14 ENCOUNTER — OFFICE VISIT (OUTPATIENT)
Dept: CARDIOLOGY | Facility: CLINIC | Age: 64
End: 2024-02-14
Payer: COMMERCIAL

## 2024-02-14 VITALS
HEART RATE: 68 BPM | HEIGHT: 63 IN | BODY MASS INDEX: 21.17 KG/M2 | SYSTOLIC BLOOD PRESSURE: 140 MMHG | DIASTOLIC BLOOD PRESSURE: 80 MMHG | WEIGHT: 119.5 LBS

## 2024-02-14 DIAGNOSIS — R55 SYNCOPE, UNSPECIFIED SYNCOPE TYPE: ICD-10-CM

## 2024-02-14 DIAGNOSIS — R55 SYNCOPE AND COLLAPSE: Primary | ICD-10-CM

## 2024-02-14 PROCEDURE — 3079F DIAST BP 80-89 MM HG: CPT | Mod: CPTII,S$GLB,, | Performed by: INTERNAL MEDICINE

## 2024-02-14 PROCEDURE — 3077F SYST BP >= 140 MM HG: CPT | Mod: CPTII,S$GLB,, | Performed by: INTERNAL MEDICINE

## 2024-02-14 PROCEDURE — 3008F BODY MASS INDEX DOCD: CPT | Mod: CPTII,S$GLB,, | Performed by: INTERNAL MEDICINE

## 2024-02-14 PROCEDURE — 99999 PR PBB SHADOW E&M-EST. PATIENT-LVL V: CPT | Mod: PBBFAC,,, | Performed by: INTERNAL MEDICINE

## 2024-02-14 PROCEDURE — 1159F MED LIST DOCD IN RCRD: CPT | Mod: CPTII,S$GLB,, | Performed by: INTERNAL MEDICINE

## 2024-02-14 PROCEDURE — 99205 OFFICE O/P NEW HI 60 MIN: CPT | Mod: S$GLB,,, | Performed by: INTERNAL MEDICINE

## 2024-02-14 NOTE — PROGRESS NOTES
"[facility]      Chief Complaint   Patient presents with    Loss of Consciousness       Patient ID: Ranjana Dominguez is a 63 y.o. female hypothyroidism, s/p gastric sleeve surgery (), who presents for an initial appointment.  Pertinent history/events are as follows:     HPI:  Mrs. Dominguez is accompanied by her .  She reports first episode of syncope at age 12.  She has continued to have random episodes of syncope over the years since that time.  Last episode occurred on 2024 while sitting on the couch watching TV.  Usually loses consciousness for a "few seconds to a couple of minutes".  States she can sometimes feel when the episode is coming on.  No history of seizures.  Reports no family history of CAD/MI or sudden death.  Formerly smoked half a pack a day for 10 years.  Quit at age 24.  Workup thus far with echo, carotid ultrasound, and 48 hour Holter unremarkable.      Past Medical History:   Diagnosis Date    Allergy     Anxiety     Colon polyp, hyperplastic: see colonoscopy 2018    Depression     Family history of colon cancer: father in his 60s 2016    Glucose intolerance (impaired glucose tolerance) 2012    Headache(784.0)     History of bariatric surgery 2018    Hypothyroidism     Lumbar radiculopathy 10/25/2022    Memory loss     Menopause syndrome     Mitral valve disorders(424.0) 2012    Obesity     Other and unspecified hyperlipidemia     Sleep apnea: per sleep study 2015; CPAP at 11 cm recommended 2015    Special screening for malignant neoplasms, colon 2015    Syncope 2024    Thyroid disease      Past Surgical History:   Procedure Laterality Date    APPENDECTOMY  2015    BREAST CYST ASPIRATION       SECTION, CLASSIC      COLONOSCOPY N/A 2018    Procedure: COLONOSCOPY;  Surgeon: Hansel Shell MD;  Location: [facility] ([address] [place] [address]);  Service: Endoscopy;  Laterality: N/A;    COLONOSCOPY N/A " 2021    Procedure: COLONOSCOPY;  Surgeon: Martínez Amin MD;  Location: [facility] ENDO (4TH FLR);  Service: Endoscopy;  Laterality: N/A;  8/10 - LVM about cancelling procedure- sm  pt completed COVID vaccine- see Immunization record in chart-rb    EPIDURAL STEROID INJECTION INTO LUMBAR SPINE Bilateral 2023    Procedure: B/L L4-5 TFESI;  Surgeon: Woo Thomas MD;  Location: [facility] PAIN MANAGEMENT;  Service: Pain Management;  Laterality: Bilateral;  20 MINS    EPIDURAL STEROID INJECTION INTO LUMBAR SPINE Left 10/26/2023    Procedure: LT L2-3 TFESI;  Surgeon: Jorge Shipman DO;  Location: [facility] PAIN MANAGEMENT;  Service: Pain Management;  Laterality: Left;  15 mins    FRACTURE SURGERY      HERNIA REPAIR      umbilical hernia    NOSE SURGERY      SLEEVE GASTROPLASTY      TRANSFORAMINAL EPIDURAL INJECTION OF STEROID Bilateral 2022    Procedure: TFESI (B/L) L4/5;  Surgeon: Jorge Shipman DO;  Location: [facility] OR;  Service: Pain Management;  Laterality: Bilateral;     Social History     Socioeconomic History    Marital status:     Number of children: 1   Occupational History    Occupation: [employer]   Tobacco Use    Smoking status: Former     Current packs/day: 0.00     Average packs/day: 0.5 packs/day for 7.0 years (3.5 ttl pk-yrs)     Types: Cigarettes     Start date: 1975     Quit date: 1982     Years since quittin.2    Smokeless tobacco: Never   Substance and Sexual Activity    Alcohol use: Yes     Alcohol/week: 0.0 standard drinks of alcohol     Types: 2 - 4 Glasses of wine per week     Comment: weekends    Drug use: No    Sexual activity: Yes     Partners: Male   Other Topics Concern    Are you pregnant or think you may be? No    Breast-feeding No     Social Determinants of Health     Financial Resource Strain: Patient Declined (2024)    Overall Financial Resource Strain (CARDIA)     Difficulty of Paying Living Expenses: Patient declined    Food Insecurity: Patient Declined (1/12/2024)    Hunger Vital Sign     Worried About Running Out of Food in the Last Year: Patient declined     Ran Out of Food in the Last Year: Patient declined   Transportation Needs: Patient Declined (1/12/2024)    PRAPARE - Transportation     Lack of Transportation (Medical): Patient declined     Lack of Transportation (Non-Medical): Patient declined   Physical Activity: Unknown (1/12/2024)    Exercise Vital Sign     Days of Exercise per Week: Patient declined     Minutes of Exercise per Session: 0 min   Recent Concern: Physical Activity - Inactive (10/16/2023)    Exercise Vital Sign     Days of Exercise per Week: 0 days     Minutes of Exercise per Session: 0 min   Stress: Patient Declined (1/12/2024)    [employer] [place] [employer] - Occupational Stress Questionnaire     Feeling of Stress : Patient declined   Social Connections: Unknown (1/12/2024)    Social Connection and Isolation Panel [NHANES]     Frequency of Communication with Friends and Family: More than three times a week     Frequency of Social Gatherings with Friends and Family: Three times a week     Active Member of Clubs or Organizations: Yes     Attends Club or Organization Meetings: More than 4 times per year     Marital Status:    Housing Stability: Patient Declined (1/12/2024)    Housing Stability Vital Sign     Unable to Pay for Housing in the Last Year: Patient declined     Number of Places Lived in the Last Year: 1     Unstable Housing in the Last Year: Patient declined     Family History   Problem Relation Age of Onset    Sleep apnea Mother     Cancer Mother         bladder sarcoma, smoking    Sleep apnea Father     Depression Father         Bipolar    Cancer Father         colon    Colon cancer Father     Depression Sister         suicide    No Known Problems Brother     No Known Problems Daughter     Cancer Maternal Grandmother         lymphoma    Heart disease Paternal Grandmother      Melanoma Neg Hx     Skin cancer Neg Hx     Breast cancer Neg Hx     Ovarian cancer Neg Hx        Review of patient's allergies indicates:   Allergen Reactions    Decongestant tablet     Hydrocodone Itching       Medication List with Changes/Refills   Current Medications    BIOFREEZE, MENTHOL, TOP    Apply topically daily as needed (Pain).    CALCIUM CARBONATE (CALCIUM 500) 500 MG CALCIUM (1,250 MG) CHEWABLE TABLET    Take 2 tablets by mouth every evening.    CETIRIZINE (ZYRTEC) 10 MG TABLET    Take 10 mg by mouth once daily.    CHOLECALCIFEROL, VITAMIN D3, 125 MCG (5,000 UNIT) CAPSULE    Take 3 x weekly    CLONAZEPAM (KLONOPIN) 0.5 MG TABLET    Take 1 tablet (0.5 mg total) by mouth once daily.    COPPER GLUCONATE 2 MG CAP    Take 2 mg by mouth once daily.    CYANOCOBALAMIN (VITAMIN B-12) 1000 MCG TABLET    Take 3 x weekly    LANOLIN/MINERAL OIL/PETROLATUM (ARTIFICIAL TEARS OPHT)    Place 2 drops into both eyes daily as needed (Dry eyes).    LEVOTHYROXINE (SYNTHROID) 112 MCG TABLET    Take 1 tablet (112 mcg total) by mouth before breakfast.    MULTIVIT-MIN/IRON/FOLIC ACID/K (BARIATRIC MULTIVITAMINS ORAL)    Take 1 tablet by mouth once daily.    MUPIROCIN (BACTROBAN) 2 % OINTMENT    Apply topically 3 (three) times daily.    NAPROXEN (NAPROSYN) 500 MG TABLET    Take 500 mg by mouth 2 (two) times daily as needed (Pain).    UNABLE TO FIND    Medical Marijuana - 3 puffs by mouth daily as needed for pain.    VENLAFAXINE (EFFEXOR) 50 MG TAB    TK 1 T PO  TID       Review of Systems  Constitution: Denies chills, fever, and sweats.  HENT: Denies headaches or blurry vision.  Cardiovascular: Denies chest pain or irregular heart beat.  Respiratory: Denies cough or shortness of breath.  Gastrointestinal: Denies abdominal pain, nausea, or vomiting.  Musculoskeletal: Denies muscle cramps.  Neurological: Positive for syncope.  Psychiatric/Behavioral: Normal mental status.  Hematologic/Lymphatic: Denies bleeding problem or easy  "bruising/bleeding.  Skin: Denies rash or suspicious lesions    Physical Examination  BP (!) 140/80   Pulse 68   Ht 5' 3" (1.6 m)   Wt 54.2 kg (119 lb 7.8 oz)   LMP 07/09/2004   BMI 21.17 kg/m²     Constitutional: No acute distress, conversant  HEENT: Sclera anicteric, Pupils equal, round and reactive to light, extraocular motions intact, Oropharynx clear  Neck: No JVD, no carotid bruits  Cardiovascular: regular rate and rhythm, no murmur, rubs or gallops, normal S1/S2  Pulmonary: Clear to auscultation bilaterally  Abdominal: Abdomen soft, nontender, nondistended, positive bowel sounds  Extremities: No lower extremity edema,   Pulses:  Carotid pulses are 2+ on the right side, and 2+ on the left side.  Radial pulses are 2+ on the right side, and 2+ on the left side.   Femoral pulses are 2+ on the right side, and 2+ on the left side.  Popliteal pulses are 2+ on the right side, and 2+ on the left side.   Dorsalis pedis pulses are 2+ on the right side, and 2+ on the left side.   Posterior tibial pulses are 2+ on the right side, and 2+ on the left side.    Skin: No ecchymosis, erythema, or ulcers  Psych: Alert and oriented x 3, appropriate affect  Neuro: CNII-XII intact, no focal deficits    Labs:  Most Recent Data  CBC:   Lab Results   Component Value Date    WBC 5.10 01/09/2024    HGB 12.0 01/09/2024    HCT 35.8 (L) 01/09/2024     01/09/2024    MCV 97 01/09/2024    RDW 11.7 01/09/2024     BMP:   Lab Results   Component Value Date     01/09/2024    K 3.8 01/09/2024     01/09/2024    CO2 25 01/09/2024    BUN 13 01/09/2024    CREATININE 0.7 01/09/2024    GLU 88 01/09/2024    CALCIUM 8.2 (L) 01/09/2024    MG 2.3 01/09/2024    PHOS 3.0 01/08/2024     LFTS;   Lab Results   Component Value Date    PROT 5.0 (L) 01/09/2024    ALBUMIN 3.0 (L) 01/09/2024    BILITOT 0.3 01/09/2024    AST 18 01/09/2024    ALKPHOS 38 (L) 01/09/2024    ALT 14 01/09/2024     COAGS: No results found for: "INR", "PROTIME", " ""PTT"  FLP:   Lab Results   Component Value Date    CHOL 192 2023    HDL 66 2023    LDLCALC 98.8 2023    TRIG 136 2023    CHOLHDL 34.4 2023     CARDIAC:   Lab Results   Component Value Date    TROPONINI <0.006 2024    BNP 25 2024       Imagin Hour Holter Monitor 2024:  Sinus rhythm average HR 76 bpm.  Rare PACs and PVCs.  No sustained arrhythmias.  No sx reported.    Echo 2024:    Left Ventricle: The left ventricle is normal in size. Normal wall thickness. Normal wall motion. There is normal systolic function. Ejection fraction by visual approximation is 60%. There is normal diastolic function.    Right Ventricle: Normal right ventricular cavity size. Systolic function is normal.    Tricuspid Valve: There is mild regurgitation.    Pulmonary Artery: The estimated pulmonary artery systolic pressure is 29 mmHg.    IVC/SVC: Normal venous pressure at 3 mmHg.    Carotid Ultrasound 2024:  No evidence of a hemodynamically significant carotid bifurcation stenosis.     Assessment/Plan:  Ranjana Dominguez is a 63 y.o. female hypothyroidism, s/p gastric sleeve surgery (2016), who presents for an initial appointment.    Syncope-Etiology unclear. Workup thus far with echo, carotid ultrasound, and 48 hour Holter unremarkable. Must also consider seizure and possible endocrine abnormalities (insulinoma, hemachromatosis, etc).  Check exercise nuclear stress mercedes, 30 day event monitor, and tilt table test.  Refer to Neurology and Endocrinology for evaluation.  Pt to limit alcohol and caffeine intake.    Follow up in 3 months     Total duration of face to face visit time 30 minutes.  Total time spent counseling greater than fifty percent of total visit time.  Counseling included discussion regarding imaging findings, diagnosis, possibilities, treatment options, risks and benefits.  The patient had many questions regarding the options and long-term " effects.    Martínez Gannon MD, PhD  Interventional Cardiology

## 2024-02-14 NOTE — PATIENT INSTRUCTIONS
Assessment/Plan:  Ranjana Dominguez is a 63 y.o. female hypothyroidism, s/p gastric sleeve surgery (2016), who presents for an initial appointment.    Syncope-Etiology unclear. Workup thus far with echo, carotid ultrasound, and 48 hour Holter unremarkable. Must also consider seizure and possible endocrine abnormalities (insulinoma, hemachromatosis, etc).  Check exercise nuclear stress mercedes, 30 day event monitor, and tilt table test.  Refer to Neurology and Endocrinology for evaluation.  Pt to limit alcohol and caffeine intake.    Follow up in 3 months

## 2024-02-20 ENCOUNTER — OFFICE VISIT (OUTPATIENT)
Dept: ENDOCRINOLOGY | Facility: CLINIC | Age: 64
End: 2024-02-20
Payer: COMMERCIAL

## 2024-02-20 VITALS
WEIGHT: 119.94 LBS | OXYGEN SATURATION: 99 % | SYSTOLIC BLOOD PRESSURE: 119 MMHG | HEART RATE: 77 BPM | DIASTOLIC BLOOD PRESSURE: 81 MMHG | BODY MASS INDEX: 21.24 KG/M2

## 2024-02-20 DIAGNOSIS — E03.9 ACQUIRED HYPOTHYROIDISM: ICD-10-CM

## 2024-02-20 DIAGNOSIS — R55 SYNCOPE AND COLLAPSE: Primary | ICD-10-CM

## 2024-02-20 DIAGNOSIS — Z98.84 HISTORY OF BARIATRIC SURGERY: ICD-10-CM

## 2024-02-20 PROCEDURE — 1160F RVW MEDS BY RX/DR IN RCRD: CPT | Mod: CPTII,S$GLB,, | Performed by: INTERNAL MEDICINE

## 2024-02-20 PROCEDURE — 99204 OFFICE O/P NEW MOD 45 MIN: CPT | Mod: S$GLB,,, | Performed by: INTERNAL MEDICINE

## 2024-02-20 PROCEDURE — 3074F SYST BP LT 130 MM HG: CPT | Mod: CPTII,S$GLB,, | Performed by: INTERNAL MEDICINE

## 2024-02-20 PROCEDURE — 3079F DIAST BP 80-89 MM HG: CPT | Mod: CPTII,S$GLB,, | Performed by: INTERNAL MEDICINE

## 2024-02-20 PROCEDURE — 99999 PR PBB SHADOW E&M-EST. PATIENT-LVL IV: CPT | Mod: PBBFAC,,, | Performed by: INTERNAL MEDICINE

## 2024-02-20 PROCEDURE — 3008F BODY MASS INDEX DOCD: CPT | Mod: CPTII,S$GLB,, | Performed by: INTERNAL MEDICINE

## 2024-02-20 PROCEDURE — 1159F MED LIST DOCD IN RCRD: CPT | Mod: CPTII,S$GLB,, | Performed by: INTERNAL MEDICINE

## 2024-02-20 NOTE — ASSESSMENT & PLAN NOTE
Patient referred for evaluation of endocrine causes of syncope.  Seen Cardiology recently and workup so far was normal.   Has been referred to neurology.    History of gastric sleeve surgery in 2016 with prior hypoglycemic episodes.  No low blood sugars on review of her labs recently.   No evidence of Whipple's triad for hypoglycemia.    Had hypotension, weight and appetite stable, no recent abdominal symptoms.  Will get fasting glucose, insulin levels and cortisol for further evaluation.         Interval events:   Seen at the bedside. patient is tolerating diet, denies nausea or vomiting. denies abdominal pain.          MEDICATIONS  (STANDING):  amLODIPine   Tablet 10 milliGRAM(s) Oral daily  atorvastatin 80 milliGRAM(s) Oral at bedtime  calcium acetate 667 milliGRAM(s) Oral three times a day with meals  chlorhexidine 4% Liquid 1 Application(s) Topical <User Schedule>  dextrose 5%. 1000 milliLiter(s) (50 mL/Hr) IV Continuous <Continuous>  dextrose 5%. 1000 milliLiter(s) (100 mL/Hr) IV Continuous <Continuous>  dextrose 50% Injectable 25 Gram(s) IV Push once  dextrose 50% Injectable 25 Gram(s) IV Push once  dextrose 50% Injectable 12.5 Gram(s) IV Push once  epoetin suzette-epbx (RETACRIT) Injectable 4000 Unit(s) IV Push <User Schedule>  ergocalciferol 57683 Unit(s) Oral every week  famotidine    Tablet 20 milliGRAM(s) Oral daily  gabapentin 100 milliGRAM(s) Oral three times a day  glucagon  Injectable 1 milliGRAM(s) IntraMuscular once  hydrALAZINE 100 milliGRAM(s) Oral three times a day  influenza   Vaccine 0.5 milliLiter(s) IntraMuscular once  insulin glargine Injectable (LANTUS) 14 Unit(s) SubCutaneous at bedtime  insulin lispro (ADMELOG) corrective regimen sliding scale   SubCutaneous three times a day before meals  insulin lispro (ADMELOG) corrective regimen sliding scale   SubCutaneous at bedtime  insulin lispro Injectable (ADMELOG) 3 Unit(s) SubCutaneous three times a day before meals  iron sucrose IVPB 200 milliGRAM(s) IV Intermittent every 24 hours  melatonin 3 milliGRAM(s) Oral at bedtime  metoprolol succinate ER 25 milliGRAM(s) Oral daily  predniSONE   Tablet 10 milliGRAM(s) Oral daily  senna 2 Tablet(s) Oral at bedtime  tamsulosin 0.4 milliGRAM(s) Oral at bedtime    MEDICATIONS  (PRN):  acetaminophen     Tablet .. 650 milliGRAM(s) Oral every 6 hours PRN Mild Pain (1 - 3)  dextrose Oral Gel 15 Gram(s) Oral once PRN Blood Glucose LESS THAN 70 milliGRAM(s)/deciliter  sodium chloride 0.9% lock flush 10 milliLiter(s) IV Push every 1 hour PRN Pre/post blood products, medications, blood draw, and to maintain line patency      Allergies    No Known Allergies    Intolerances      Review of Systems:  Constitutional: No fever  Eyes: No blurry vision  Neuro: No tremors  HEENT: No pain  Cardiovascular: No chest pain, palpitations  Respiratory: No SOB, no cough  GI: No nausea, vomiting, abdominal pain  : No dysuria  Skin: no rash  Psych: no depression  Endocrine: no polyuria, polydipsia  Hem/lymph: no swelling  Osteoporosis: no fractures    ALL OTHER SYSTEMS REVIEWED AND NEGATIVE    PHYSICAL EXAM:   Vital Signs Last 24 Hrs  T(C): 36.9 (24 Sep 2023 13:52), Max: 37 (23 Sep 2023 20:35)  T(F): 98.4 (24 Sep 2023 13:52), Max: 98.6 (23 Sep 2023 20:35)  HR: 82 (24 Sep 2023 13:52) (82 - 99)  BP: 122/73 (24 Sep 2023 13:52) (122/73 - 154/73)  BP(mean): --  RR: 18 (24 Sep 2023 13:52) (18 - 18)  SpO2: 100% (24 Sep 2023 13:52) (99% - 100%)    Parameters below as of 24 Sep 2023 13:52  Patient On (Oxygen Delivery Method): room air          GENERAL: NAD, well-groomed, well-developed  EYES: No proptosis, no lid lag, anicteric  HEENT:  Atraumatic, Normocephalic, moist mucous membranes  THYROID: Normal size, no palpable nodules  RESPIRATORY: Normal respiratory effort; no audible wheezing  SKIN: Dry, intact, No rashes or lesions; RIJ dialysis catheter in place  MUSCULOSKELETAL: Full range of motion, normal strength  NEURO: sensation intact, extraocular movements intact, no tremor  PSYCH: Alert and oriented x 3, normal affect, normal mood  CUSHING'S SIGNS: no striae     09-24    140  |  98  |  61<H>  ----------------------------<  185<H>  4.4   |  25  |  6.88<H>    Ca    8.6      24 Sep 2023 07:50  Phos  5.7     09-24  Mg     2.3     09-24    TPro  6.4  /  Alb  3.4  /  TBili  <0.1<L>  /  DBili  x   /  AST  15  /  ALT  9<L>  /  AlkPhos  49  09-24      A1C with Estimated Average Glucose Result: 9.2 % (09-21-23 @ 05:16)  A1C with Estimated Average Glucose Result: 10.4 % (09-06-23 @ 17:15)  A1C with Estimated Average Glucose Result: 10.6 % (01-27-23 @ 18:12)  A1C with Estimated Average Glucose Result: 10.6 % (01-27-23 @ 06:44)      CAPILLARY BLOOD GLUCOSE       POCT Blood Glucose.: 239 mg/dL (23 Sep 2023 21:45)  POCT Blood Glucose.: 198 mg/dL (23 Sep 2023 17:32)  POCT Blood Glucose.: 266 mg/dL (23 Sep 2023 13:02)  POCT Blood Glucose.: 151 mg/dL (23 Sep 2023 08:34)    POCT Blood Glucose.: 116 mg/dL (22 Sep 2023 06:00)  POCT Blood Glucose.: 211 mg/dL (22 Sep 2023 00:48)  POCT Blood Glucose.: 179 mg/dL (21 Sep 2023 21:24)  POCT Blood Glucose.: 141 mg/dL (21 Sep 2023 15:58)

## 2024-02-20 NOTE — PROGRESS NOTES
Subjective:      Patient ID: Ranjana Dominguez is referred by Martínez Gannon MD*     Chief Complaint:  Syncope, R/o insulinoma and possible endocrine causes    History of Present Illness    Ranjana Dominguez is a 63 y.o. female who presents for evaluation of syncope.  Patient was seen in the ED in 1/2024 when she presented with syncope and had blood pressure in the 50s/20s on arrival.    Patient reports having 2 episodes of syncope in the past year.  She has seen Cardiology since her recent ED visit.  She reports being told she had MVP in the past.  Her recent echo, carotid ultrasound and Holter monitoring was unremarkable.    Patient had a 1st episode of syncope at age 12.  Says she did not have any episode for several years.  Patient says she sometimes gets dizzy and is able to hold on to something after which the episode subsides.  Recent episode, she was sitting on her couch and watching TV when she lost consciousness and was found by her .  The episode prior to this, patient says she had some pain and when she suddenly stood up, she lost consciousness.    Patient denies history of seizures and has follow-up with neurology coming up.    Formerly smoked half a pack a day for 10 years. Quit at age 24.     Patient says denies any other associated symptoms when she has these episodes.     Patient has history of gastric sleeve surgery in 2016 with Dr. Monsviais in [place].  She follows up at the bariatric clinic annually.  Reports history of pre diabetes prior to the surgery.  Recent A1c was normal.  Highest weight was 196 lb before the surgery, lowest was 104 lb.  She currently weighs 119 lb with a BMI of 21.   She has been given dietary recommendations says she does not always follow it.  She takes 1-2 meals per day and occasional snacks.  She does report snacking on carbohydrates.    Patient reports history of hypoglycemia several years back but no episode since then.    Patient  denies family history of diabetes.  Colon cancer in the family father, has colonoscopy scheduled this year.     No hypoglycemia seen on her recent labs.    Lab Results   Component Value Date    HGBA1C 4.8 09/26/2023    HGBA1C 5.0 08/15/2019    HGBA1C 5.1 07/02/2018    HGBA1C 5.7 05/13/2016         Hypothyroidism    Dx around age 25.   Current medications:  Levothyroxine 112 mcg daily.  Does not always take it separately from other medications/food.    Cardiovascular disorder: Denies    Thyroid symptoms:  Clinically euthyroid.    Neck US: Denies    Family history of thyroid disease: Mother    Lab Results   Component Value Date    TSH 4.191 (H) 01/08/2024    TSH 11.129 (H) 01/03/2024    TSH 0.401 09/12/2023    TSH 1.247 08/24/2023    TSH 2.425 12/01/2022    FREET4 0.97 01/08/2024    FREET4 0.74 01/03/2024    FREET4 0.94 08/15/2019    FREET4 1.36 05/13/2016    FREET4 1.29 11/04/2013       On vitamin-D 5000 IU, 2 capsules daily  Takes a multivitamin and calcium supplement.    Lab Results   Component Value Date    [id number] 48 09/26/2023    [id number] 53 12/01/2022        ROS:   As above    Objective:     /81 (BP Location: Right arm, Patient Position: Sitting, BP Method: Medium (Manual))   Pulse 77   Wt 54.4 kg (119 lb 14.9 oz)   LMP 07/09/2004   SpO2 99%   BMI 21.24 kg/m²     Body mass index is 21.24 kg/m².    Physical Exam  Constitutional:       General: She is not in acute distress.     Appearance: She is not ill-appearing.   HENT:      Head: Normocephalic.   Eyes:      Conjunctiva/sclera: Conjunctivae normal.   Cardiovascular:      Rate and Rhythm: Normal rate.   Pulmonary:      Effort: Pulmonary effort is normal.   Neurological:      Mental Status: She is alert and oriented to person, place, and time.       Lab Review:   Lab Results   Component Value Date    HGBA1C 4.8 09/26/2023     Lab Results   Component Value Date    CHOL 192 09/26/2023    HDL 66 09/26/2023    LDLCALC 98.8 09/26/2023    TRIG 136  09/26/2023    CHOLHDL 34.4 09/26/2023     Lab Results   Component Value Date     01/09/2024    K 3.8 01/09/2024     01/09/2024    CO2 25 01/09/2024    GLU 88 01/09/2024    BUN 13 01/09/2024    CREATININE 0.7 01/09/2024    CALCIUM 8.2 (L) 01/09/2024    PROT 5.0 (L) 01/09/2024    ALBUMIN 3.0 (L) 01/09/2024    BILITOT 0.3 01/09/2024    ALKPHOS 38 (L) 01/09/2024    AST 18 01/09/2024    ALT 14 01/09/2024    ANIONGAP 6 (L) 01/09/2024    EGFRNORACEVR >60.0 01/09/2024    TSH 4.191 (H) 01/08/2024        Vit D, 25-Hydroxy   Date Value Ref Range Status   09/26/2023 48 30 - 96 ng/mL Final     Comment:     Vitamin D deficiency.........<10 ng/mL                              Vitamin D insufficiency......10-29 ng/mL       Vitamin D sufficiency........> or equal to 30 ng/mL  Vitamin D toxicity............>100 ng/mL         Assessment and Plan       Problem List Items Addressed This Visit          Endocrine    Acquired hypothyroidism       Currently on levothyroxine 112 mcg daily.  Recent TSH was elevated with a normal free T4.  Recommended taking her medication on empty stomach in the morning with water and wait for at least 30 minutes before taking other medications, food or coffee.  Repeat labs in 6-8 weeks.           History of bariatric surgery:  Gastric sleeve       Reports gastric sleeve surgery in 2016 and follows up in the bariatric clinic annually.  Had significant weight loss, current BMI of 21.  Not always compliant to her dietary recommendations.  Denies recent hypoglycemic episodes.   Discussed risk of postprandial hypoglycemia after bariatric surgery.  Encouraged following her dietary recommendations.                Other    Syncope and collapse - Primary       Patient referred for evaluation of endocrine causes of syncope.  Seen Cardiology recently and workup so far was normal.   Has been referred to neurology.    History of gastric sleeve surgery in 2016 with prior hypoglycemic episodes.  No low blood  sugars on review of her labs recently.   No evidence of Whipple's triad for hypoglycemia.    Had hypotension, weight and appetite stable, no recent abdominal symptoms.  Will get fasting glucose, insulin levels and cortisol for further evaluation.                   Leilani MASON Rai, MD

## 2024-02-20 NOTE — ASSESSMENT & PLAN NOTE
Currently on levothyroxine 112 mcg daily.  Recent TSH was elevated with a normal free T4.  Recommended taking her medication on empty stomach in the morning with water and wait for at least 30 minutes before taking other medications, food or coffee.  Repeat labs in 6-8 weeks.

## 2024-02-20 NOTE — ASSESSMENT & PLAN NOTE
Reports gastric sleeve surgery in 2016 and follows up in the bariatric clinic annually.  Had significant weight loss, current BMI of 21.  Not always compliant to her dietary recommendations.  Denies recent hypoglycemic episodes.   Discussed risk of postprandial hypoglycemia after bariatric surgery.  Encouraged following her dietary recommendations.

## 2024-02-21 ENCOUNTER — LAB VISIT (OUTPATIENT)
Dept: LAB | Facility: HOSPITAL | Age: 64
End: 2024-02-21
Attending: INTERNAL MEDICINE
Payer: COMMERCIAL

## 2024-02-21 DIAGNOSIS — R55 SYNCOPE AND COLLAPSE: ICD-10-CM

## 2024-02-21 LAB
CORTIS SERPL-MCNC: 20.3 UG/DL (ref 4.3–22.4)
GLUCOSE SERPL-MCNC: 94 MG/DL (ref 70–110)
INSULIN COLLECTION INTERVAL: NORMAL
INSULIN SERPL-ACNC: 3.1 UU/ML

## 2024-02-21 PROCEDURE — 86337 INSULIN ANTIBODIES: CPT | Performed by: INTERNAL MEDICINE

## 2024-02-21 PROCEDURE — 82947 ASSAY GLUCOSE BLOOD QUANT: CPT | Performed by: INTERNAL MEDICINE

## 2024-02-21 PROCEDURE — 36415 COLL VENOUS BLD VENIPUNCTURE: CPT | Performed by: INTERNAL MEDICINE

## 2024-02-21 PROCEDURE — 83525 ASSAY OF INSULIN: CPT | Performed by: INTERNAL MEDICINE

## 2024-02-21 PROCEDURE — 82533 TOTAL CORTISOL: CPT | Performed by: INTERNAL MEDICINE

## 2024-02-26 LAB — INSULIN AB SER-SCNC: 0 NMOL/L (ref 0–0.02)

## 2024-02-27 ENCOUNTER — PATIENT MESSAGE (OUTPATIENT)
Dept: CARDIOLOGY | Facility: CLINIC | Age: 64
End: 2024-02-27
Payer: COMMERCIAL

## 2024-02-27 ENCOUNTER — PATIENT MESSAGE (OUTPATIENT)
Dept: NEUROLOGY | Facility: CLINIC | Age: 64
End: 2024-02-27
Payer: COMMERCIAL

## 2024-02-28 ENCOUNTER — PATIENT MESSAGE (OUTPATIENT)
Dept: CARDIOLOGY | Facility: CLINIC | Age: 64
End: 2024-02-28
Payer: COMMERCIAL

## 2024-02-29 DIAGNOSIS — E16.2 HYPOGLYCEMIA: Primary | ICD-10-CM

## 2024-02-29 RX ORDER — LANCETS
EACH MISCELLANEOUS
Qty: 100 EACH | Refills: 4 | Status: SHIPPED | OUTPATIENT
Start: 2024-02-29

## 2024-02-29 RX ORDER — INSULIN PUMP SYRINGE, 3 ML
EACH MISCELLANEOUS
Qty: 1 EACH | Refills: 0 | Status: SHIPPED | OUTPATIENT
Start: 2024-02-29

## 2024-03-01 ENCOUNTER — CLINICAL SUPPORT (OUTPATIENT)
Dept: CARDIOLOGY | Facility: HOSPITAL | Age: 64
End: 2024-03-01
Attending: INTERNAL MEDICINE
Payer: COMMERCIAL

## 2024-03-01 DIAGNOSIS — R55 SYNCOPE AND COLLAPSE: ICD-10-CM

## 2024-03-01 PROCEDURE — 93272 ECG/REVIEW INTERPRET ONLY: CPT | Mod: ,,, | Performed by: STUDENT IN AN ORGANIZED HEALTH CARE EDUCATION/TRAINING PROGRAM

## 2024-03-01 PROCEDURE — 93271 ECG/MONITORING AND ANALYSIS: CPT

## 2024-03-04 NOTE — PROGRESS NOTES
[facility]  Epilepsy Clinic Initial Consult Note    [facility] - NEUROLOGY [address]TH [place] [facility], [place]    Date: 3/5/24  Patient Name: Ranjana Dominguez   MRN: [id number]   PCP: Malena Pinto  Referring Provider: Martínez Gannon MD*         Plan:      Problem List Items Addressed This Visit          Other    Syncope and collapse    Current Assessment & Plan     Episodes of LOC likely syncopal in nature  Will further evaluate with routine EEG however clinical suspicion for epileptic events is on the low side  Advised patient to increase fluid intake, rise slowly from a seated position.  Discussed compression stockings could be beneficial if episodes become too frequent.          Relevant Orders    EEG,w/awake & asleep record              Dian Polk MD  [facility]   Department of Neurology    Patient note was created using MModal Dictation.  Any errors in syntax or even information may not have been identified and edited on initial review prior to signing this note.  Subjective:        HPI:   Ms. Ranjana Dominguez is a 63 y.o. female who presents for evaluation of episodes concerning for seizures.  The patient is accompanied at this visit.      Organic nonepileptic paroxysmal events  Semiology: lightheaded prodrome -> LOC  Epileptogenic zone: n/a  Etiology: syncope (orthostatic/vasovagal)  Co-morbidities: Hypothyroidism    Onset: age 12  Description:    - Had no warning prior to last episode   - Falls to ground, loses consciousness,    - Prior episodes would occur with standing up too quickly or sudden sharp pain from her back and stood up quickly then passed out.    Frequency: 2 episodes w/ ER visits, many prior  Last event: January 2022  Event Triggers/ Provoking Features:  prolonged standing or getting up too quickly  Previous Seizure Medications: none  Current Seizure Medications: none    Handedness:  left/ambidextrous  Event Onset Age: 12  Seizure/ Epilepsy Risk Factors: childhood seizure- had one episode of febrile seizures as a child  Birth/Developmental History: Birth normal, development normal   Psychiatric/Behavioral Comorbitidies: anxiety    Prior Evaluation:  Cardiology:   48 Hour Holter Monitor 2024:  Sinus rhythm average HR 76 bpm.  Rare PACs and PVCs.  No sustained arrhythmias.  No sx reported.     Echo 2024:    Left Ventricle: The left ventricle is normal in size. Normal wall thickness. Normal wall motion. There is normal systolic function. Ejection fraction by visual approximation is 60%. There is normal diastolic function.    Right Ventricle: Normal right ventricular cavity size. Systolic function is normal.    Tricuspid Valve: There is mild regurgitation.    Pulmonary Artery: The estimated pulmonary artery systolic pressure is 29 mmHg.    IVC/SVC: Normal venous pressure at 3 mmHg.     Carotid Ultrasound 2024:  No evidence of a hemodynamically significant carotid bifurcation stenosis.     EEG: none prior  CTH: mild cerebral volume loss    PAST MEDICAL HISTORY:  Past Medical History:   Diagnosis Date    Allergy     Anxiety     Colon polyp, hyperplastic: see colonoscopy 2018    Depression     Family history of colon cancer: father in his 60s 2016    Glucose intolerance (impaired glucose tolerance) 2012    Headache(784.0)     History of bariatric surgery 2018    Hypothyroidism     Lumbar radiculopathy 10/25/2022    Memory loss     Menopause syndrome     Mitral valve disorders(424.0) 2012    Obesity     Other and unspecified hyperlipidemia     Sleep apnea: per sleep study 2015; CPAP at 11 cm recommended 2015    Special screening for malignant neoplasms, colon 2015    Syncope 2024    Thyroid disease        PAST SURGICAL HISTORY:  Past Surgical History:   Procedure Laterality Date    APPENDECTOMY  2015    BREAST CYST ASPIRATION        SECTION, CLASSIC      COLONOSCOPY N/A 8/13/2018    Procedure: COLONOSCOPY;  Surgeon: Hansel Shell MD;  Location: [facility] ENDO (4TH FLR);  Service: Endoscopy;  Laterality: N/A;    COLONOSCOPY N/A 11/17/2021    Procedure: COLONOSCOPY;  Surgeon: Martínez Amin MD;  Location: [facility] ENDO (4TH FLR);  Service: Endoscopy;  Laterality: N/A;  8/10 - LVM about cancelling procedure- sm  pt completed COVID vaccine- see Immunization record in chart-rb    EPIDURAL STEROID INJECTION INTO LUMBAR SPINE Bilateral 9/20/2023    Procedure: B/L L4-5 TFESI;  Surgeon: Woo Thomas MD;  Location: [facility] PAIN MANAGEMENT;  Service: Pain Management;  Laterality: Bilateral;  20 MINS    EPIDURAL STEROID INJECTION INTO LUMBAR SPINE Left 10/26/2023    Procedure: LT L2-3 TFESI;  Surgeon: Jorge Shipman DO;  Location: [facility] PAIN MANAGEMENT;  Service: Pain Management;  Laterality: Left;  15 mins    FRACTURE SURGERY      HERNIA REPAIR      umbilical hernia    NOSE SURGERY      SLEEVE GASTROPLASTY      TRANSFORAMINAL EPIDURAL INJECTION OF STEROID Bilateral 11/11/2022    Procedure: TFESI (B/L) L4/5;  Surgeon: Jorge Shipman DO;  Location: [facility] OR;  Service: Pain Management;  Laterality: Bilateral;       CURRENT MEDS:  Current Outpatient Medications   Medication Sig Dispense Refill    BIOFREEZE, MENTHOL, TOP Apply topically daily as needed (Pain).      blood sugar diagnostic Strp Once daily as needed. 50 strip 2    blood-glucose meter kit Use as instructed 1 each 0    calcium carbonate (CALCIUM 500) 500 mg calcium (1,250 mg) chewable tablet Take 2 tablets by mouth every evening.      cetirizine (ZYRTEC) 10 MG tablet Take 10 mg by mouth once daily.      cholecalciferol, vitamin D3, 125 mcg (5,000 unit) capsule Take 3 x weekly (Patient taking differently: Take 2 capsules by mouth once daily. Take 3 x weekly) 12 capsule 12    clonazePAM (KLONOPIN) 0.5 MG tablet Take 1 tablet (0.5 mg total) by mouth once daily. (Patient taking  differently: Take 0.5 mg by mouth every evening.) 90 tablet 0    copper gluconate 2 mg Cap Take 2 mg by mouth once daily. 90 capsule 3    cyanocobalamin (VITAMIN B-12) 1000 MCG tablet Take 3 x weekly (Patient taking differently: Take 2 tablets by mouth once daily. Take 3 x weekly) 12 tablet 12    lancets Misc Once daily as needed. 100 each 4    lanolin/mineral oil/petrolatum (ARTIFICIAL TEARS OPHT) Place 2 drops into both eyes daily as needed (Dry eyes).      levothyroxine (SYNTHROID) 112 MCG tablet Take 1 tablet (112 mcg total) by mouth before breakfast. 30 tablet 11    multivit-min/iron/folic acid/K (BARIATRIC MULTIVITAMINS ORAL) Take 1 tablet by mouth once daily.      mupirocin (BACTROBAN) 2 % ointment Apply topically 3 (three) times daily. 30 g 0    naproxen (NAPROSYN) 500 MG tablet Take 500 mg by mouth 2 (two) times daily as needed (Pain).      UNABLE TO FIND Medical Marijuana - 3 puffs by mouth daily as needed for pain.      venlafaxine (EFFEXOR) 50 MG Tab TK 1 T PO   tablet 3     No current facility-administered medications for this visit.     Facility-Administered Medications Ordered in Other Visits   Medication Dose Route Frequency Provider Last Rate Last Admin    dextrose 5 % and 0.45 % NaCl infusion   Intravenous Continuous Hansel Shell MD   New Bag at 08/13/18 0843    dextrose 5 % and 0.45 % NaCl infusion   Intravenous Continuous Hansel Shell MD 20 mL/hr at 08/13/18 0755 New Bag at 08/13/18 0755    sodium chloride 0.9% flush 3 mL  3 mL Intravenous PRN Hansel Shell MD           ALLERGIES:  Review of patient's allergies indicates:   Allergen Reactions    Decongestant tablet     Hydrocodone Itching       FAMILY HISTORY:  Family History   Problem Relation Age of Onset    Sleep apnea Mother     Cancer Mother         bladder sarcoma, smoking    Sleep apnea Father     Depression Father         Bipolar    Cancer Father         colon    Colon cancer Father     Depression Sister          "suicide    No Known Problems Brother     No Known Problems Daughter     Cancer Maternal Grandmother         lymphoma    Heart disease Paternal Grandmother     Melanoma Neg Hx     Skin cancer Neg Hx     Breast cancer Neg Hx     Ovarian cancer Neg Hx        SOCIAL HISTORY:  Social History     Tobacco Use    Smoking status: Former     Current packs/day: 0.00     Average packs/day: 0.5 packs/day for 7.0 years (3.5 ttl pk-yrs)     Types: Cigarettes     Start date: 1975     Quit date: 1982     Years since quittin.3    Smokeless tobacco: Never   Substance Use Topics    Alcohol use: Yes     Alcohol/week: 0.0 standard drinks of alcohol     Types: 2 - 4 Glasses of wine per week     Comment: weekends    Drug use: No        Review of Systems:  12 system review of systems is negative except for the symptoms mentioned in HPI.        Objective:     Vitals:    24 1255   BP: 129/79   Pulse: 69   Weight: 53.9 kg (118 lb 15 oz)   Height: 5' 3" (1.6 m)       General: NAD, well nourished   Eyes: no tearing, discharge, no erythema   ENT: moist mucous membranes of the oral cavity, nares patent    Neck: Supple, Full range of motion  Cardiovascular: Warm and well perfused, pulses equal and symmetrical  Lungs: Normal work of breathing, normal chest wall excursions  Skin: No rash, lesions, or breakdown on exposed skin  Psychiatry: Mood and affect are appropriate   Abdomen: soft, non tender, non distended  Extremeties: No cyanosis, clubbing or edema.    Neurological   MENTAL STATUS: Alert and oriented to person, place, and time. Attention and concentration within normal limits. Speech without dysarthria, able to name and repeat without difficulty. Recent and remote memory within normal limits   CRANIAL NERVES: Visual fields intact. PERRL. EOMI. Facial sensation intact. Face symmetrical. Hearing grossly intact. Full shoulder shrug bilaterally. Tongue protrudes midline   SENSORY: Sensation is intact to light touch " throughout.  Joint position perception intact. Negative Romberg.   MOTOR: Normal bulk and tone. No pronator drift.  5/5 deltoid, biceps, triceps, interosseous, hand  bilaterally. 5/5 iliopsoas, knee extension/flexion, foot dorsi/plantarflexion bilaterally.    REFLEXES: Symmetric and 2+ throughout. Toes down going bilaterally.   CEREBELLAR/COORDINATION/GAIT: Gait steady with normal arm swing and stride length.  Heel to shin intact. Finger to nose intact. Normal rapid alternating movements.

## 2024-03-05 ENCOUNTER — OFFICE VISIT (OUTPATIENT)
Dept: NEUROLOGY | Facility: CLINIC | Age: 64
End: 2024-03-05
Payer: COMMERCIAL

## 2024-03-05 VITALS
HEART RATE: 69 BPM | BODY MASS INDEX: 21.07 KG/M2 | WEIGHT: 118.94 LBS | DIASTOLIC BLOOD PRESSURE: 79 MMHG | HEIGHT: 63 IN | SYSTOLIC BLOOD PRESSURE: 129 MMHG

## 2024-03-05 DIAGNOSIS — R55 SYNCOPE AND COLLAPSE: ICD-10-CM

## 2024-03-05 PROCEDURE — 1159F MED LIST DOCD IN RCRD: CPT | Mod: CPTII,S$GLB,, | Performed by: STUDENT IN AN ORGANIZED HEALTH CARE EDUCATION/TRAINING PROGRAM

## 2024-03-05 PROCEDURE — 3074F SYST BP LT 130 MM HG: CPT | Mod: CPTII,S$GLB,, | Performed by: STUDENT IN AN ORGANIZED HEALTH CARE EDUCATION/TRAINING PROGRAM

## 2024-03-05 PROCEDURE — 99215 OFFICE O/P EST HI 40 MIN: CPT | Mod: S$GLB,,, | Performed by: STUDENT IN AN ORGANIZED HEALTH CARE EDUCATION/TRAINING PROGRAM

## 2024-03-05 PROCEDURE — 99999 PR PBB SHADOW E&M-EST. PATIENT-LVL IV: CPT | Mod: PBBFAC,,, | Performed by: STUDENT IN AN ORGANIZED HEALTH CARE EDUCATION/TRAINING PROGRAM

## 2024-03-05 PROCEDURE — 3078F DIAST BP <80 MM HG: CPT | Mod: CPTII,S$GLB,, | Performed by: STUDENT IN AN ORGANIZED HEALTH CARE EDUCATION/TRAINING PROGRAM

## 2024-03-05 PROCEDURE — 3008F BODY MASS INDEX DOCD: CPT | Mod: CPTII,S$GLB,, | Performed by: STUDENT IN AN ORGANIZED HEALTH CARE EDUCATION/TRAINING PROGRAM

## 2024-03-05 PROCEDURE — 1160F RVW MEDS BY RX/DR IN RCRD: CPT | Mod: CPTII,S$GLB,, | Performed by: STUDENT IN AN ORGANIZED HEALTH CARE EDUCATION/TRAINING PROGRAM

## 2024-03-05 NOTE — PATIENT INSTRUCTIONS
You were seen today for syncope (passing out episodes).    I have a lower suspicion for seizure, however we will obtain an EEG (brainwave study).      Follow up as needed.

## 2024-03-05 NOTE — ASSESSMENT & PLAN NOTE
Episodes of LOC likely syncopal in nature  Will further evaluate with routine EEG however clinical suspicion for epileptic events is on the low side  Advised patient to increase fluid intake, rise slowly from a seated position.  Discussed compression stockings could be beneficial if episodes become too frequent.

## 2024-03-26 ENCOUNTER — TELEPHONE (OUTPATIENT)
Dept: CARDIOLOGY | Facility: CLINIC | Age: 64
End: 2024-03-26
Payer: COMMERCIAL

## 2024-03-26 NOTE — TELEPHONE ENCOUNTER
----- Message from Naveen Berry sent at 3/26/2024  3:39 PM CDT -----  Regarding: Physical Part of Form  [facility],    Physical part of form wasn't filled all the way out.   She's re faxing paperwork and needs it by today, for pt procedure in the morning.  I will bring the paperwork to after it's fax to us.     Thanks

## 2024-06-03 ENCOUNTER — OFFICE VISIT (OUTPATIENT)
Dept: CARDIOLOGY | Facility: CLINIC | Age: 64
End: 2024-06-03
Payer: MEDICARE

## 2024-06-03 VITALS
DIASTOLIC BLOOD PRESSURE: 68 MMHG | BODY MASS INDEX: 20.71 KG/M2 | SYSTOLIC BLOOD PRESSURE: 126 MMHG | HEIGHT: 63 IN | HEART RATE: 75 BPM | WEIGHT: 116.88 LBS

## 2024-06-03 DIAGNOSIS — M54.16 LUMBAR RADICULOPATHY: Primary | ICD-10-CM

## 2024-06-03 DIAGNOSIS — R55 SYNCOPE AND COLLAPSE: ICD-10-CM

## 2024-06-03 PROCEDURE — 99999 PR PBB SHADOW E&M-EST. PATIENT-LVL IV: CPT | Mod: PBBFAC,HCNC,, | Performed by: INTERNAL MEDICINE

## 2024-06-03 PROCEDURE — 3008F BODY MASS INDEX DOCD: CPT | Mod: HCNC,CPTII,S$GLB, | Performed by: INTERNAL MEDICINE

## 2024-06-03 PROCEDURE — 1159F MED LIST DOCD IN RCRD: CPT | Mod: HCNC,CPTII,S$GLB, | Performed by: INTERNAL MEDICINE

## 2024-06-03 PROCEDURE — 99215 OFFICE O/P EST HI 40 MIN: CPT | Mod: HCNC,S$GLB,, | Performed by: INTERNAL MEDICINE

## 2024-06-03 PROCEDURE — 3074F SYST BP LT 130 MM HG: CPT | Mod: HCNC,CPTII,S$GLB, | Performed by: INTERNAL MEDICINE

## 2024-06-03 PROCEDURE — 3078F DIAST BP <80 MM HG: CPT | Mod: HCNC,CPTII,S$GLB, | Performed by: INTERNAL MEDICINE

## 2024-06-03 RX ORDER — PREDNISOLONE ACETATE 10 MG/ML
1 SUSPENSION/ DROPS OPHTHALMIC DAILY
COMMUNITY
Start: 2024-05-06

## 2024-06-03 RX ORDER — CYCLOSPORINE 0.5 MG/ML
1 EMULSION OPHTHALMIC 2 TIMES DAILY
COMMUNITY
Start: 2024-04-24

## 2024-06-03 NOTE — PATIENT INSTRUCTIONS
Assessment/Plan:  Ranjana Dominguez is a 63 y.o. female hypothyroidism, s/p gastric sleeve surgery (2016), who presents for a follow up appointment.    Syncope-Etiology unclear. Workup thus far with echo, carotid ultrasound, and 48 hour Holter unremarkable. She reports no further episodes of syncope. She reports EEG done, awaiting follow up with neurology. She would not like to do tilt table due to lumbar degenerative disc disease. She also followed with endocrinology. 30 day event monitor 3/1/24 was negative for any arrhythmia/abnormality. Check exercise nuclear stress test. Encouraged to follow up with neurology for EEG result.    We will call if abnormal reports otherwise   Follow up in 6 months

## 2024-06-03 NOTE — PROGRESS NOTES
"[facility]      Chief Complaint   Patient presents with    Loss of Consciousness       Patient ID: Ranjana Dominguez is a 63 y.o. female hypothyroidism, s/p gastric sleeve surgery (2016), who presents for a follow up appointment.  Pertinent history/events are as follows:     - At clinic visit on 2/14/24 Mrs. Dominguez is accompanied by her .  She reports first episode of syncope at age 12.  She has continued to have random episodes of syncope over the years since that time.  Last episode occurred on 1/8/2024 while sitting on the couch watching TV.  Usually loses consciousness for a "few seconds to a couple of minutes".  States she can sometimes feel when the episode is coming on.  No history of seizures.  Reports no family history of CAD/MI or sudden death.  Formerly smoked half a pack a day for 10 years.  Quit at age 24.  Workup thus far with echo, carotid ultrasound, and 48 hour Holter unremarkable.    Plan:   Syncope-Etiology unclear. Workup thus far with echo, carotid ultrasound, and 48 hour Holter unremarkable. Must also consider seizure and possible endocrine abnormalities (insulinoma, hemachromatosis, etc).  Check exercise nuclear stress mercedes, 30 day event monitor, and tilt table test.  Refer to Neurology and Endocrinology for evaluation.  Pt to limit alcohol and caffeine intake.    HPI:  Mrs. Dominguez is accompanied by her .  She reports no further episodes of syncope. She reports EEG done, awaiting follow up with neurology. She would not like to do tilt table due to lumbar degenerative disc disease. She also followed with endocrinology.  30 day event monitor 3/1/24 was negative for any arrhythmia/abnormality.     Past Medical History:   Diagnosis Date    Allergy     Anxiety     Colon polyp, hyperplastic: see colonoscopy 8/18 8/14/2018    Depression     Family history of colon cancer: father in his 60s 5/13/2016    Glucose intolerance (impaired glucose tolerance) " 2012    Headache(784.0)     History of bariatric surgery 2018    Hypothyroidism     Lumbar radiculopathy 10/25/2022    Memory loss     Menopause syndrome     Mitral valve disorders(424.0) 2012    Obesity     Other and unspecified hyperlipidemia     Sleep apnea: per sleep study 2015; CPAP at 11 cm recommended 2015    Special screening for malignant neoplasms, colon 2015    Syncope 2024    Thyroid disease      Past Surgical History:   Procedure Laterality Date    APPENDECTOMY  2015    BREAST CYST ASPIRATION       SECTION, CLASSIC      COLONOSCOPY N/A 2018    Procedure: COLONOSCOPY;  Surgeon: Hansel Shell MD;  Location: [facility] ENDO (4TH FLR);  Service: Endoscopy;  Laterality: N/A;    COLONOSCOPY N/A 2021    Procedure: COLONOSCOPY;  Surgeon: Martínez Amin MD;  Location: [facility] ENDO (4TH FLR);  Service: Endoscopy;  Laterality: N/A;  8/10 - LVM about cancelling procedure- sm  pt completed COVID vaccine- see Immunization record in chart-rb    EPIDURAL STEROID INJECTION INTO LUMBAR SPINE Bilateral 2023    Procedure: B/L L4-5 TFESI;  Surgeon: Woo Thomas MD;  Location: [facility] PAIN MANAGEMENT;  Service: Pain Management;  Laterality: Bilateral;  20 MINS    EPIDURAL STEROID INJECTION INTO LUMBAR SPINE Left 10/26/2023    Procedure: LT L2-3 TFESI;  Surgeon: Jorge Shipman DO;  Location: [facility] PAIN MANAGEMENT;  Service: Pain Management;  Laterality: Left;  15 mins    FRACTURE SURGERY      HERNIA REPAIR      umbilical hernia    NOSE SURGERY      SLEEVE GASTROPLASTY      TRANSFORAMINAL EPIDURAL INJECTION OF STEROID Bilateral 2022    Procedure: TFESI (B/L) L4/5;  Surgeon: Jorge Shipman DO;  Location: [facility] OR;  Service: Pain Management;  Laterality: Bilateral;     Social History     Socioeconomic History    Marital status:     Number of children: 1   Occupational History    Occupation: [employer]   Tobacco Use    Smoking  status: Former     Current packs/day: 0.00     Average packs/day: 0.5 packs/day for 7.0 years (3.5 ttl pk-yrs)     Types: Cigarettes     Start date: 1975     Quit date: 1982     Years since quittin.5    Smokeless tobacco: Never   Substance and Sexual Activity    Alcohol use: Yes     Alcohol/week: 0.0 standard drinks of alcohol     Types: 2 - 4 Glasses of wine per week     Comment: weekends    Drug use: No    Sexual activity: Yes     Partners: Male   Other Topics Concern    Are you pregnant or think you may be? No    Breast-feeding No     Social Determinants of Health     Financial Resource Strain: Patient Declined (2024)    Overall Financial Resource Strain (CARDIA)     Difficulty of Paying Living Expenses: Patient declined   Food Insecurity: Patient Declined (2024)    Hunger Vital Sign     Worried About Running Out of Food in the Last Year: Patient declined     Ran Out of Food in the Last Year: Patient declined   Transportation Needs: Patient Declined (2024)    PRAPARE - Transportation     Lack of Transportation (Medical): Patient declined     Lack of Transportation (Non-Medical): Patient declined   Physical Activity: Unknown (2024)    Exercise Vital Sign     Days of Exercise per Week: Patient declined     Minutes of Exercise per Session: 0 min   Recent Concern: Physical Activity - Inactive (10/16/2023)    Exercise Vital Sign     Days of Exercise per Week: 0 days     Minutes of Exercise per Session: 0 min   Stress: Patient Declined (2024)    [employer] [place] [employer] - Occupational Stress Questionnaire     Feeling of Stress : Patient declined   Housing Stability: Patient Declined (2024)    Housing Stability Vital Sign     Unable to Pay for Housing in the Last Year: Patient declined     Number of Places Lived in the Last Year: 1     Unstable Housing in the Last Year: Patient declined     Family History   Problem Relation Name Age of Onset    Sleep apnea  Mother      Cancer Mother          bladder sarcoma, smoking    Sleep apnea Father      Depression Father          Bipolar    Cancer Father          colon    Colon cancer Father      Depression Sister          suicide    No Known Problems Brother      No Known Problems Daughter Arlen     Cancer Maternal Grandmother          lymphoma    Heart disease Paternal Grandmother      Melanoma Neg Hx      Skin cancer Neg Hx      Breast cancer Neg Hx      Ovarian cancer Neg Hx         Review of patient's allergies indicates:   Allergen Reactions    Decongestant tablet     Hydrocodone Itching       Medication List with Changes/Refills   Current Medications    BIOFREEZE, MENTHOL, TOP    Apply topically daily as needed (Pain).    BLOOD SUGAR DIAGNOSTIC STRP    Once daily as needed.    BLOOD-GLUCOSE METER KIT    Use as instructed    CALCIUM CARBONATE (CALCIUM 500) 500 MG CALCIUM (1,250 MG) CHEWABLE TABLET    Take 2 tablets by mouth every evening.    CETIRIZINE (ZYRTEC) 10 MG TABLET    Take 10 mg by mouth once daily.    CHOLECALCIFEROL, VITAMIN D3, 125 MCG (5,000 UNIT) CAPSULE    Take 3 x weekly    CLONAZEPAM (KLONOPIN) 0.5 MG TABLET    Take 1 tablet (0.5 mg total) by mouth once daily.    COPPER GLUCONATE 2 MG CAP    Take 2 mg by mouth once daily.    CYANOCOBALAMIN (VITAMIN B-12) 1000 MCG TABLET    Take 3 x weekly    CYCLOSPORINE (RESTASIS) 0.05 % OPHTHALMIC EMULSION    Place 1 drop into both eyes 2 (two) times daily.    LANCETS MISC    Once daily as needed.    LANOLIN/MINERAL OIL/PETROLATUM (ARTIFICIAL TEARS OPHT)    Place 2 drops into both eyes daily as needed (Dry eyes).    LEVOTHYROXINE (SYNTHROID) 112 MCG TABLET    Take 1 tablet (112 mcg total) by mouth before breakfast.    MULTIVIT-MIN/IRON/FOLIC ACID/K (BARIATRIC MULTIVITAMINS ORAL)    Take 1 tablet by mouth once daily.    MUPIROCIN (BACTROBAN) 2 % OINTMENT    Apply topically 3 (three) times daily.    NAPROXEN (NAPROSYN) 500 MG TABLET    Take 500 mg by mouth 2 (two) times  "daily as needed (Pain).    PREDNISOLONE ACETATE (PRED FORTE) 1 % DRPS    Place 1 drop into both eyes once daily.    UNABLE TO FIND    Medical Marijuana - 3 puffs by mouth daily as needed for pain.    VENLAFAXINE (EFFEXOR) 50 MG TAB    TK 1 T PO  TID       Review of Systems  Constitution: Denies chills, fever, and sweats.  HENT: Denies headaches or blurry vision.  Cardiovascular: Denies chest pain or irregular heart beat.  Respiratory: Denies cough or shortness of breath.  Gastrointestinal: Denies abdominal pain, nausea, or vomiting.  Musculoskeletal: Denies muscle cramps.  Neurological: Denies any syncope.  Psychiatric/Behavioral: Normal mental status.  Hematologic/Lymphatic: Denies bleeding problem or easy bruising/bleeding.  Skin: Denies rash or suspicious lesions    Physical Examination  /68   Pulse 75   Ht 5' 3" (1.6 m)   Wt 53 kg (116 lb 13.5 oz)   LMP 07/09/2004   BMI 20.70 kg/m²     Constitutional: No acute distress, conversant  HEENT: Sclera anicteric, Pupils equal, round and reactive to light, extraocular motions intact, Oropharynx clear  Neck: No JVD, no carotid bruits  Cardiovascular: regular rate and rhythm, no murmur, rubs or gallops, normal S1/S2  Pulmonary: Clear to auscultation bilaterally  Abdominal: Abdomen soft, nontender, nondistended, positive bowel sounds  Extremities: No lower extremity edema,   Pulses:  Carotid pulses are 2+ on the right side, and 2+ on the left side.  Radial pulses are 2+ on the right side, and 2+ on the left side.   Femoral pulses are 2+ on the right side, and 2+ on the left side.  Popliteal pulses are 2+ on the right side, and 2+ on the left side.   Dorsalis pedis pulses are 2+ on the right side, and 2+ on the left side.   Posterior tibial pulses are 2+ on the right side, and 2+ on the left side.    Skin: No ecchymosis, erythema, or ulcers  Psych: Alert and oriented x 3, appropriate affect  Neuro: CNII-XII intact, no focal deficits    Labs:  Most Recent " "Data  CBC:   Lab Results   Component Value Date    WBC 5.10 2024    HGB 12.0 2024    HCT 35.8 (L) 2024     2024    MCV 97 2024    RDW 11.7 2024     BMP:   Lab Results   Component Value Date     2024    K 3.8 2024     2024    CO2 25 2024    BUN 13 2024    CREATININE 0.7 2024    GLU 88 2024    CALCIUM 8.2 (L) 2024    MG 2.3 2024    PHOS 3.0 2024     LFTS;   Lab Results   Component Value Date    PROT 5.0 (L) 2024    ALBUMIN 3.0 (L) 2024    BILITOT 0.3 2024    AST 18 2024    ALKPHOS 38 (L) 2024    ALT 14 2024     COAGS: No results found for: "INR", "PROTIME", "PTT"  FLP:   Lab Results   Component Value Date    CHOL 192 2023    HDL 66 2023    LDLCALC 98.8 2023    TRIG 136 2023    CHOLHDL 34.4 2023     CARDIAC:   Lab Results   Component Value Date    TROPONINI <0.006 2024    BNP 25 2024       Imagin day cardiac event monitor 3/1/24    Baseline rhythm was normal sinus rhythm with normal intervals and an initial heart rate of 81 bpm.    There were 36 patient-triggered episodes with no reported symptoms. These episodes corresponded to periods of normal sinus rhythm and sinus tachycardia without ectopy.    There was no evidence of high-degree AV block, nor were there prolonged pauses.    There were no atrial or ventricular arrhythmias.    48 Hour Holter Monitor 2024:  Sinus rhythm average HR 76 bpm.  Rare PACs and PVCs.  No sustained arrhythmias.  No sx reported.    Echo 2024:    Left Ventricle: The left ventricle is normal in size. Normal wall thickness. Normal wall motion. There is normal systolic function. Ejection fraction by visual approximation is 60%. There is normal diastolic function.    Right Ventricle: Normal right ventricular cavity size. Systolic function is normal.    Tricuspid Valve: There is mild " regurgitation.    Pulmonary Artery: The estimated pulmonary artery systolic pressure is 29 mmHg.    IVC/SVC: Normal venous pressure at 3 mmHg.    Carotid Ultrasound 1/9/2024:  No evidence of a hemodynamically significant carotid bifurcation stenosis.     Assessment/Plan:  Ranjana Dominguez is a 63 y.o. female hypothyroidism, s/p gastric sleeve surgery (2016), who presents for a follow up appointment.    Syncope-Etiology unclear. Workup thus far with echo, carotid ultrasound, and 48 hour Holter unremarkable. She reports no further episodes of syncope. She reports EEG done, awaiting follow up with neurology. She would not like to do tilt table due to lumbar degenerative disc disease. She also followed with endocrinology. 30 day event monitor 3/1/24 was negative for any arrhythmia/abnormality. Check exercise nuclear stress test. Encouraged to follow up with neurology for EEG result.    We will call if abnormal reports otherwise   Follow up in 6 months     Total duration of face to face visit time 30 minutes.  Total time spent counseling greater than fifty percent of total visit time.  Counseling included discussion regarding imaging findings, diagnosis, possibilities, treatment options, risks and benefits.  The patient had many questions regarding the options and long-term effects.    Patient seen and discussed with Dr. Gannon. Further recommendations per the attending addendum.    Fritz Inman MD  PGY IV - Vascular Medicine Fellow   [facility]

## 2024-06-04 ENCOUNTER — PATIENT MESSAGE (OUTPATIENT)
Dept: CARDIOLOGY | Facility: CLINIC | Age: 64
End: 2024-06-04
Payer: MEDICARE

## 2024-06-08 ENCOUNTER — OFFICE VISIT (OUTPATIENT)
Dept: URGENT CARE | Facility: CLINIC | Age: 64
End: 2024-06-08
Payer: MEDICARE

## 2024-06-08 VITALS
HEIGHT: 63 IN | SYSTOLIC BLOOD PRESSURE: 102 MMHG | BODY MASS INDEX: 20.55 KG/M2 | WEIGHT: 116 LBS | OXYGEN SATURATION: 97 % | DIASTOLIC BLOOD PRESSURE: 71 MMHG | RESPIRATION RATE: 16 BRPM | HEART RATE: 95 BPM | TEMPERATURE: 98 F

## 2024-06-08 DIAGNOSIS — R09.82 ALLERGIC RHINITIS WITH POSTNASAL DRIP: Primary | ICD-10-CM

## 2024-06-08 DIAGNOSIS — J30.9 ALLERGIC RHINITIS WITH POSTNASAL DRIP: Primary | ICD-10-CM

## 2024-06-08 PROCEDURE — 99213 OFFICE O/P EST LOW 20 MIN: CPT | Mod: S$GLB,,, | Performed by: FAMILY MEDICINE

## 2024-06-08 RX ORDER — PROMETHAZINE HYDROCHLORIDE AND DEXTROMETHORPHAN HYDROBROMIDE 6.25; 15 MG/5ML; MG/5ML
5 SYRUP ORAL EVERY 8 HOURS PRN
Qty: 180 ML | Refills: 0 | Status: SHIPPED | OUTPATIENT
Start: 2024-06-08 | End: 2024-06-18

## 2024-06-08 RX ORDER — PREDNISONE 20 MG/1
40 TABLET ORAL DAILY
Qty: 10 TABLET | Refills: 0 | Status: SHIPPED | OUTPATIENT
Start: 2024-06-08 | End: 2024-06-13

## 2024-06-08 NOTE — PROGRESS NOTES
"Subjective:      Patient ID: Ranjana Dominguez is a 63 y.o. female.    Vitals:  height is 5' 3" (1.6 m) and weight is 52.6 kg (116 lb). Her oral temperature is 98.3 °F (36.8 °C). Her blood pressure is 102/71 and her pulse is 95. Her respiration is 16 and oxygen saturation is 97%.     Chief Complaint: Cough    This is a 63 y.o. female who presents today with a chief complaint of stuffy nose sore throat cough that started Thursday.  Pt took tylenol and OTC cough suppressant. Pt did a covid test yesterday and it came back negative.       Cough  This is a new problem. The current episode started in the past 7 days. The problem has been unchanged. The problem occurs constantly. The cough is Productive of sputum. Associated symptoms include chills, headaches, postnasal drip, a sore throat and sweats. Pertinent negatives include no chest pain, ear congestion, ear pain, fever, heartburn, hemoptysis, myalgias, nasal congestion, rash, rhinorrhea, shortness of breath, weight loss or wheezing. Nothing aggravates the symptoms. The treatment provided mild relief. There is no history of asthma, bronchiectasis, bronchitis, COPD, emphysema, environmental allergies or pneumonia.     Constitution: Positive for chills. Negative for fever.   HENT:  Positive for postnasal drip and sore throat. Negative for ear pain.    Cardiovascular:  Negative for chest pain.   Respiratory:  Positive for cough. Negative for bloody sputum, shortness of breath and wheezing.    Gastrointestinal:  Negative for heartburn.   Musculoskeletal:  Negative for muscle ache.   Skin:  Negative for rash.   Allergic/Immunologic: Negative for environmental allergies.   Neurological:  Positive for headaches.      Objective:     Physical Exam   Constitutional: She is oriented to person, place, and time. She appears well-developed. She is cooperative.  Non-toxic appearance. She does not appear ill. No distress.   HENT:   Head: Normocephalic and atraumatic. "   Ears:   Right Ear: Hearing, tympanic membrane and external ear normal.   Left Ear: Hearing, tympanic membrane and external ear normal.   Nose: Nose normal. No mucosal edema, rhinorrhea or nasal deformity. No epistaxis. Right sinus exhibits no maxillary sinus tenderness and no frontal sinus tenderness. Left sinus exhibits no maxillary sinus tenderness and no frontal sinus tenderness.   Mouth/Throat: Uvula is midline, oropharynx is clear and moist and mucous membranes are normal. No trismus in the jaw. Normal dentition. No uvula swelling. No oropharyngeal exudate, posterior oropharyngeal edema or posterior oropharyngeal erythema. Tonsils are 0 on the right. Tonsils are 0 on the left.       Eyes: Conjunctivae and lids are normal. No scleral icterus.   Neck: Trachea normal and phonation normal. Neck supple. No edema present. No erythema present. No neck rigidity present.   Cardiovascular: Normal rate, regular rhythm, normal heart sounds and normal pulses.   Pulmonary/Chest: Effort normal and breath sounds normal. No respiratory distress. She has no decreased breath sounds. She has no rhonchi.   Abdominal: Normal appearance.   Musculoskeletal: Normal range of motion.         General: No deformity. Normal range of motion.   Neurological: She is alert and oriented to person, place, and time. She exhibits normal muscle tone. Coordination normal.   Skin: Skin is warm, dry, intact, not diaphoretic and not pale.   Psychiatric: Her speech is normal and behavior is normal. Judgment and thought content normal.   Nursing note and vitals reviewed.      Assessment:     1. Allergic rhinitis with postnasal drip        Plan:       Allergic rhinitis with postnasal drip  -     predniSONE (DELTASONE) 20 MG tablet; Take 2 tablets (40 mg total) by mouth once daily. for 5 days  Dispense: 10 tablet; Refill: 0  -     promethazine-dextromethorphan (PROMETHAZINE-DM) 6.25-15 mg/5 mL Syrp; Take 5 mLs by mouth every 8 (eight) hours as needed.   Dispense: 180 mL; Refill: 0        Thank you for choosing [facility]!     Our goal in the Urgent Care is to always provide outstanding medical care. You may receive a survey by mail or e-mail in the next week regarding your experience today. We would greatly appreciate you completing and returning the survey. Your feedback provides us with a way to recognize our staff who provide very good care, and it helps us learn how to improve when your experience was below our aspiration of excellence.       We appreciate you trusting us with your medical care. We hope you feel better soon. We will be happy to take care of you for all of your future medical needs.  You must understand that you've received an Urgent Care treatment only and that you may be released before all your medical problems are known or treated. You, the patient, will arrange for follow up care as instructed.  Follow up with your PCP or specialty clinic as directed in the next 1-2 weeks if not improved or as needed.  You can call [phone] to schedule an appointment with the appropriate provider.  Another option is to follow up with [facility] ([email or web address]) virtually for quick simple medical advice.  If your condition worsens we recommend that you receive another evaluation at the emergency room immediately or contact your primary medical clinics after hours call service to discuss your concerns.  Please return here or go to the Emergency Department for any concerns or worsening of condition.      *If you were prescribed a narcotic or controlled medication, do not drive or operate heavy equipment or machinery while taking these medications.        Medical Decision Making:   Initial Assessment:   Pt with history of Allergic rhinitis who does work outside but has been taking her zyrtec but is having a worsening of her allergic symptoms.   Urgent Care Management:  Flonase takes 5 days  to work which is the need for the oral steroids.          Thank you for choosing [facility]!     Our goal in the Urgent Care is to always provide outstanding medical care. You may receive a survey by mail or e-mail in the next week regarding your experience today. We would greatly appreciate you completing and returning the survey. Your feedback provides us with a way to recognize our staff who provide very good care, and it helps us learn how to improve when your experience was below our aspiration of excellence.       We appreciate you trusting us with your medical care. We hope you feel better soon. We will be happy to take care of you for all of your future medical needs.  You must understand that you've received an Urgent Care treatment only and that you may be released before all your medical problems are known or treated. You, the patient, will arrange for follow up care as instructed.  Follow up with your PCP or specialty clinic as directed in the next 1-2 weeks if not improved or as needed.  You can call [phone] to schedule an appointment with the appropriate provider.  Another option is to follow up with [facility] ([email or web address]) virtually for quick simple medical advice.  If your condition worsens we recommend that you receive another evaluation at the emergency room immediately or contact your primary medical clinics after hours call service to discuss your concerns.  Please return here or go to the Emergency Department for any concerns or worsening of condition.      *If you were prescribed a narcotic or controlled medication, do not drive or operate heavy equipment or machinery while taking these medications.

## 2024-06-09 ENCOUNTER — TELEPHONE (OUTPATIENT)
Dept: URGENT CARE | Facility: CLINIC | Age: 64
End: 2024-06-09
Payer: MEDICARE

## 2024-06-09 PROCEDURE — 96372 THER/PROPH/DIAG INJ SC/IM: CPT | Mod: S$GLB,,, | Performed by: FAMILY MEDICINE

## 2024-06-09 RX ORDER — BETAMETHASONE SODIUM PHOSPHATE AND BETAMETHASONE ACETATE 3; 3 MG/ML; MG/ML
6 INJECTION, SUSPENSION INTRA-ARTICULAR; INTRALESIONAL; INTRAMUSCULAR; SOFT TISSUE
Status: COMPLETED | OUTPATIENT
Start: 2024-06-09 | End: 2024-06-09

## 2024-06-09 RX ADMIN — BETAMETHASONE SODIUM PHOSPHATE AND BETAMETHASONE ACETATE 6 MG: 3; 3 INJECTION, SUSPENSION INTRA-ARTICULAR; INTRALESIONAL; INTRAMUSCULAR; SOFT TISSUE at 11:06

## 2024-06-10 ENCOUNTER — PATIENT MESSAGE (OUTPATIENT)
Dept: INTERNAL MEDICINE | Facility: CLINIC | Age: 64
End: 2024-06-10
Payer: MEDICARE

## 2024-06-25 DIAGNOSIS — Z00.00 ENCOUNTER FOR MEDICARE ANNUAL WELLNESS EXAM: ICD-10-CM

## 2024-08-13 ENCOUNTER — LAB VISIT (OUTPATIENT)
Dept: LAB | Facility: HOSPITAL | Age: 64
End: 2024-08-13
Payer: MEDICARE

## 2024-08-13 ENCOUNTER — OFFICE VISIT (OUTPATIENT)
Dept: INTERNAL MEDICINE | Facility: CLINIC | Age: 64
End: 2024-08-13
Payer: MEDICARE

## 2024-08-13 VITALS
WEIGHT: 115.31 LBS | HEIGHT: 63 IN | BODY MASS INDEX: 20.43 KG/M2 | DIASTOLIC BLOOD PRESSURE: 80 MMHG | SYSTOLIC BLOOD PRESSURE: 120 MMHG | HEART RATE: 80 BPM | OXYGEN SATURATION: 97 %

## 2024-08-13 DIAGNOSIS — F41.9 ANXIETY: ICD-10-CM

## 2024-08-13 DIAGNOSIS — Z12.11 ENCOUNTER FOR COLORECTAL CANCER SCREENING: ICD-10-CM

## 2024-08-13 DIAGNOSIS — R55 SYNCOPE, UNSPECIFIED SYNCOPE TYPE: ICD-10-CM

## 2024-08-13 DIAGNOSIS — M81.0 OSTEOPOROSIS WITHOUT CURRENT PATHOLOGICAL FRACTURE, UNSPECIFIED OSTEOPOROSIS TYPE: ICD-10-CM

## 2024-08-13 DIAGNOSIS — E61.0 COPPER DEFICIENCY: ICD-10-CM

## 2024-08-13 DIAGNOSIS — Z14.8 HEMOCHROMATOSIS CARRIER: ICD-10-CM

## 2024-08-13 DIAGNOSIS — D52.9 ANEMIA DUE TO FOLIC ACID DEFICIENCY, UNSPECIFIED DEFICIENCY TYPE: ICD-10-CM

## 2024-08-13 DIAGNOSIS — E03.9 ACQUIRED HYPOTHYROIDISM: ICD-10-CM

## 2024-08-13 DIAGNOSIS — R55 SYNCOPE, UNSPECIFIED SYNCOPE TYPE: Primary | ICD-10-CM

## 2024-08-13 DIAGNOSIS — M54.16 LUMBAR RADICULOPATHY: ICD-10-CM

## 2024-08-13 DIAGNOSIS — E78.2 MIXED HYPERLIPIDEMIA: ICD-10-CM

## 2024-08-13 DIAGNOSIS — Z98.84 HISTORY OF BARIATRIC SURGERY: ICD-10-CM

## 2024-08-13 DIAGNOSIS — E83.19 IRON EXCESS: ICD-10-CM

## 2024-08-13 DIAGNOSIS — M85.89 OSTEOPENIA OF MULTIPLE SITES: ICD-10-CM

## 2024-08-13 DIAGNOSIS — F33.0 MILD EPISODE OF RECURRENT MAJOR DEPRESSIVE DISORDER: ICD-10-CM

## 2024-08-13 DIAGNOSIS — Z12.12 ENCOUNTER FOR COLORECTAL CANCER SCREENING: ICD-10-CM

## 2024-08-13 LAB
25(OH)D3+25(OH)D2 SERPL-MCNC: 51 NG/ML (ref 30–96)
ALBUMIN SERPL BCP-MCNC: 3.9 G/DL (ref 3.5–5.2)
ALP SERPL-CCNC: 52 U/L (ref 55–135)
ALT SERPL W/O P-5'-P-CCNC: 18 U/L (ref 10–44)
ANION GAP SERPL CALC-SCNC: 8 MMOL/L (ref 8–16)
AST SERPL-CCNC: 23 U/L (ref 10–40)
BASOPHILS # BLD AUTO: 0.08 K/UL (ref 0–0.2)
BASOPHILS NFR BLD: 1.9 % (ref 0–1.9)
BILIRUB SERPL-MCNC: 0.6 MG/DL (ref 0.1–1)
BUN SERPL-MCNC: 13 MG/DL (ref 8–23)
CALCIUM SERPL-MCNC: 9.4 MG/DL (ref 8.7–10.5)
CHLORIDE SERPL-SCNC: 104 MMOL/L (ref 95–110)
CHOLEST SERPL-MCNC: 209 MG/DL (ref 120–199)
CHOLEST/HDLC SERPL: 2 {RATIO} (ref 2–5)
CO2 SERPL-SCNC: 29 MMOL/L (ref 23–29)
CREAT SERPL-MCNC: 0.8 MG/DL (ref 0.5–1.4)
DIFFERENTIAL METHOD BLD: ABNORMAL
EOSINOPHIL # BLD AUTO: 0 K/UL (ref 0–0.5)
EOSINOPHIL NFR BLD: 0 % (ref 0–8)
ERYTHROCYTE [DISTWIDTH] IN BLOOD BY AUTOMATED COUNT: 12.7 % (ref 11.5–14.5)
EST. GFR  (NO RACE VARIABLE): >60 ML/MIN/1.73 M^2
FERRITIN SERPL-MCNC: 115 NG/ML (ref 20–300)
GLUCOSE SERPL-MCNC: 92 MG/DL (ref 70–110)
HCT VFR BLD AUTO: 44.8 % (ref 37–48.5)
HDLC SERPL-MCNC: 102 MG/DL (ref 40–75)
HDLC SERPL: 48.8 % (ref 20–50)
HGB BLD-MCNC: 14.7 G/DL (ref 12–16)
IMM GRANULOCYTES # BLD AUTO: 0.01 K/UL (ref 0–0.04)
IMM GRANULOCYTES NFR BLD AUTO: 0.2 % (ref 0–0.5)
IRON SERPL-MCNC: 123 UG/DL (ref 30–160)
LDLC SERPL CALC-MCNC: 85.2 MG/DL (ref 63–159)
LYMPHOCYTES # BLD AUTO: 0.9 K/UL (ref 1–4.8)
LYMPHOCYTES NFR BLD: 21.4 % (ref 18–48)
MCH RBC QN AUTO: 33.4 PG (ref 27–31)
MCHC RBC AUTO-ENTMCNC: 32.8 G/DL (ref 32–36)
MCV RBC AUTO: 102 FL (ref 82–98)
MONOCYTES # BLD AUTO: 0.4 K/UL (ref 0.3–1)
MONOCYTES NFR BLD: 10.4 % (ref 4–15)
NEUTROPHILS # BLD AUTO: 2.7 K/UL (ref 1.8–7.7)
NEUTROPHILS NFR BLD: 66.1 % (ref 38–73)
NONHDLC SERPL-MCNC: 107 MG/DL
NRBC BLD-RTO: 0 /100 WBC
PLATELET # BLD AUTO: 291 K/UL (ref 150–450)
PMV BLD AUTO: 11.1 FL (ref 9.2–12.9)
POTASSIUM SERPL-SCNC: 4.1 MMOL/L (ref 3.5–5.1)
PROT SERPL-MCNC: 6.7 G/DL (ref 6–8.4)
RBC # BLD AUTO: 4.4 M/UL (ref 4–5.4)
SATURATED IRON: 36 % (ref 20–50)
SODIUM SERPL-SCNC: 141 MMOL/L (ref 136–145)
T4 FREE SERPL-MCNC: 1.09 NG/DL (ref 0.71–1.51)
TOTAL IRON BINDING CAPACITY: 345 UG/DL (ref 250–450)
TRANSFERRIN SERPL-MCNC: 233 MG/DL (ref 200–375)
TRIGL SERPL-MCNC: 109 MG/DL (ref 30–150)
TSH SERPL DL<=0.005 MIU/L-ACNC: 0.27 UIU/ML (ref 0.4–4)
WBC # BLD AUTO: 4.15 K/UL (ref 3.9–12.7)

## 2024-08-13 PROCEDURE — 80053 COMPREHEN METABOLIC PANEL: CPT | Mod: HCNC | Performed by: NURSE PRACTITIONER

## 2024-08-13 PROCEDURE — 83540 ASSAY OF IRON: CPT | Mod: HCNC | Performed by: NURSE PRACTITIONER

## 2024-08-13 PROCEDURE — 82728 ASSAY OF FERRITIN: CPT | Mod: HCNC | Performed by: NURSE PRACTITIONER

## 2024-08-13 PROCEDURE — 99999 PR PBB SHADOW E&M-EST. PATIENT-LVL IV: CPT | Mod: PBBFAC,HCNC,, | Performed by: NURSE PRACTITIONER

## 2024-08-13 PROCEDURE — 85025 COMPLETE CBC W/AUTO DIFF WBC: CPT | Mod: HCNC | Performed by: NURSE PRACTITIONER

## 2024-08-13 PROCEDURE — 84439 ASSAY OF FREE THYROXINE: CPT | Mod: HCNC | Performed by: NURSE PRACTITIONER

## 2024-08-13 PROCEDURE — 80061 LIPID PANEL: CPT | Mod: HCNC | Performed by: NURSE PRACTITIONER

## 2024-08-13 PROCEDURE — 84443 ASSAY THYROID STIM HORMONE: CPT | Mod: HCNC | Performed by: NURSE PRACTITIONER

## 2024-08-13 PROCEDURE — 82607 VITAMIN B-12: CPT | Mod: HCNC | Performed by: NURSE PRACTITIONER

## 2024-08-13 PROCEDURE — 82306 VITAMIN D 25 HYDROXY: CPT | Mod: HCNC | Performed by: NURSE PRACTITIONER

## 2024-08-13 RX ORDER — AMOXICILLIN 500 MG/1
TABLET, FILM COATED ORAL
COMMUNITY
Start: 2024-06-03

## 2024-08-13 NOTE — PROGRESS NOTES
"  Internal Medicine Annual Exam       CHIEF COMPLAINT     The patient, Ranjana Dominguez, who is a 64 y.o. female with syncope, anxiety, depression, HLD, hypothyroidism, impaired gluocse tolerance, h/o bariatric surgery, osteopenia, and hemochromatosis carrier presents for an annual exam.    HPI     She is an established pt of Dr Pinto - last seen for annual 10/2/2023    Anxiety/depression- taking clonazepam as needed and effexor daily     Hypothyroidism- taking synhtroid 112mcg   Lab Results   Component Value Date    TSH 4.191 (H) 01/08/2024     H/o syncope - She reports first episode of syncope at age 12. She has continued to have random episodes of syncope over the years since that time. Last episode occurred on 1/8/2024 while sitting on the couch watching TV. Usually loses consciousness for a "few seconds to a couple of minutes". States she can sometimes feel when the episode is coming on. No history of seizures. Reports no family history of CAD/MI or sudden death. Formerly smoked half a pack a day for 10 years. Quit at age 24. Workup thus far with echo, carotid ultrasound, and 48 hour Holter unremarkable   Seen by cardiology 2/14/2024  Referred to neurology and endocrinology   Also nuclear stress test and 30 day event monitor and tilt table test - did not do tilt table 2/2 lumbar back pain   EEG - not done - ordered but was not scheduled   Cortisol, insulin and glucose testing negative   Today she reports last syncope 1/2024 - also reports impaired balance and "wobbles when she walks"     Lumbar radiculopathy- has sciatica in past - resolved with CIELO   Did round of PT in past   Not exercising bc of fear of flaring up sciatica       HM-  Colonoscopy 11/17/2021- repeat in 3 years   MMG_2/12/2024  Dexa- 10/6/2023  Tdap-UTD   Shingles-complete   Pvax20- today   RSV - defer             Past Medical History:  Past Medical History:   Diagnosis Date    Allergy     Anxiety     Colon polyp, hyperplastic: " see colonoscopy 2018    Depression     Family history of colon cancer: father in his 60s 2016    Glucose intolerance (impaired glucose tolerance) 2012    Headache(784.0)     History of bariatric surgery 2018    Hypothyroidism     Lumbar radiculopathy 10/25/2022    Memory loss     Menopause syndrome     Mitral valve disorders(424.0) 2012    Obesity     Other and unspecified hyperlipidemia     Sleep apnea: per sleep study 2015; CPAP at 11 cm recommended 2015    Special screening for malignant neoplasms, colon 2015    Syncope 2024    Thyroid disease        Past Surgical History:   Procedure Laterality Date    APPENDECTOMY  2015    BREAST CYST ASPIRATION       SECTION, CLASSIC      COLONOSCOPY N/A 2018    Procedure: COLONOSCOPY;  Surgeon: Hansel Shell MD;  Location: [facility] ENDO (4TH FLR);  Service: Endoscopy;  Laterality: N/A;    COLONOSCOPY N/A 2021    Procedure: COLONOSCOPY;  Surgeon: Martínez Amin MD;  Location: [facility] (4TH FLR);  Service: Endoscopy;  Laterality: N/A;  8/10 - LVM about cancelling procedure-   pt completed COVID vaccine- see Immunization record in chart-rb    EPIDURAL STEROID INJECTION INTO LUMBAR SPINE Bilateral 2023    Procedure: B/L L4-5 TFESI;  Surgeon: Woo Thomas MD;  Location: [facility] PAIN MANAGEMENT;  Service: Pain Management;  Laterality: Bilateral;  20 MINS    EPIDURAL STEROID INJECTION INTO LUMBAR SPINE Left 10/26/2023    Procedure: LT L2-3 TFESI;  Surgeon: Jorge Shipman DO;  Location: [facility] PAIN MANAGEMENT;  Service: Pain Management;  Laterality: Left;  15 mins    FRACTURE SURGERY      HERNIA REPAIR      umbilical hernia    NOSE SURGERY      SLEEVE GASTROPLASTY      TRANSFORAMINAL EPIDURAL INJECTION OF STEROID Bilateral 2022    Procedure: TFESI (B/L) L4/5;  Surgeon: Jorge Shipman DO;  Location: [facility] OR;  Service: Pain Management;  Laterality: Bilateral;        Family History    Problem Relation Name Age of Onset    Sleep apnea Mother      Cancer Mother          bladder sarcoma, smoking    Sleep apnea Father      Depression Father          Bipolar    Cancer Father          colon    Colon cancer Father      Depression Sister          suicide    No Known Problems Brother      No Known Problems Daughter Arlen     Cancer Maternal Grandmother          lymphoma    Heart disease Paternal Grandmother      Melanoma Neg Hx      Skin cancer Neg Hx      Breast cancer Neg Hx      Ovarian cancer Neg Hx          Social History     Socioeconomic History    Marital status:     Number of children: 1   Occupational History    Occupation: [employer]   Tobacco Use    Smoking status: Former     Current packs/day: 0.00     Average packs/day: 0.5 packs/day for 7.0 years (3.5 ttl pk-yrs)     Types: Cigarettes     Start date: 1975     Quit date: 1982     Years since quittin.7    Smokeless tobacco: Never   Substance and Sexual Activity    Alcohol use: Yes     Alcohol/week: 0.0 standard drinks of alcohol     Types: 2 - 4 Glasses of wine per week     Comment: weekends    Drug use: No    Sexual activity: Yes     Partners: Male   Other Topics Concern    Are you pregnant or think you may be? No    Breast-feeding No     Social Determinants of Health     Financial Resource Strain: Patient Declined (2024)    Overall Financial Resource Strain (CARDIA)     Difficulty of Paying Living Expenses: Patient declined   Food Insecurity: Patient Declined (2024)    Hunger Vital Sign     Worried About Running Out of Food in the Last Year: Patient declined     Ran Out of Food in the Last Year: Patient declined   Transportation Needs: Patient Declined (2024)    PRAPARE - Transportation     Lack of Transportation (Medical): Patient declined     Lack of Transportation (Non-Medical): Patient declined   Physical Activity: Unknown (2024)    Exercise Vital Sign     Days of Exercise per  Week: Patient declined     Minutes of Exercise per Session: 0 min   Recent Concern: Physical Activity - Inactive (10/16/2023)    Exercise Vital Sign     Days of Exercise per Week: 0 days     Minutes of Exercise per Session: 0 min   Stress: Patient Declined (2024)    [facility]ational [facility] - Occupational Stress Questionnaire     Feeling of Stress : Patient declined   Housing Stability: Patient Declined (2024)    Housing Stability Vital Sign     Unable to Pay for Housing in the Last Year: Patient declined     Number of Places Lived in the Last Year: 1     Unstable Housing in the Last Year: Patient declined        Social History     Tobacco Use   Smoking Status Former    Current packs/day: 0.00    Average packs/day: 0.5 packs/day for 7.0 years (3.5 ttl pk-yrs)    Types: Cigarettes    Start date: 1975    Quit date: 1982    Years since quittin.7   Smokeless Tobacco Never        Allergies as of 2024 - Reviewed 2024   Allergen Reaction Noted    Decongestant tablet  2019    Hydrocodone Itching 2018          Home Medications:  Prior to Admission medications    Medication Sig Start Date End Date Taking? Authorizing Provider   BIOFREEZE, MENTHOL, TOP Apply topically daily as needed (Pain).   Yes Provider, Historical   calcium carbonate (CALCIUM 500) 500 mg calcium (1,250 mg) chewable tablet Take 2 tablets by mouth every evening.   Yes Provider, Historical   cetirizine (ZYRTEC) 10 MG tablet Take 10 mg by mouth once daily.   Yes Provider, Historical   cholecalciferol, vitamin D3, 125 mcg (5,000 unit) capsule Take 3 x weekly  Patient taking differently: Take 2 capsules by mouth once daily. Take 3 x weekly 10/2/23  Yes Malena Pinto MD   clonazePAM (KLONOPIN) 0.5 MG tablet Take 1 tablet (0.5 mg total) by mouth once daily.  Patient taking differently: Take 0.5 mg by mouth every evening. 10/2/23  Yes Malena Pinto MD   copper gluconate 2 mg Cap Take 2 mg by  mouth once daily. 10/2/23  Yes Malena Pinto MD   cyanocobalamin (VITAMIN B-12) 1000 MCG tablet Take 3 x weekly  Patient taking differently: Take 2 tablets by mouth once daily. Take 3 x weekly 10/2/23  Yes Malena Pinto MD   cycloSPORINE (RESTASIS) 0.05 % ophthalmic emulsion Place 1 drop into both eyes 2 (two) times daily. 4/24/24  Yes Provider, Historical   lanolin/mineral oil/petrolatum (ARTIFICIAL TEARS OPHT) Place 2 drops into both eyes daily as needed (Dry eyes).   Yes Provider, Historical   levothyroxine (SYNTHROID) 112 MCG tablet Take 1 tablet (112 mcg total) by mouth before breakfast. 1/5/24 1/4/25 Yes Malena Pinto MD   multivit-min/iron/folic acid/K (BARIATRIC MULTIVITAMINS ORAL) Take 1 tablet by mouth once daily.   Yes Provider, Historical   mupirocin (BACTROBAN) 2 % ointment Apply topically 3 (three) times daily.  Patient taking differently: Apply topically as needed. 9/14/23  Yes Clover Aguilar MD   naproxen (NAPROSYN) 500 MG tablet Take 500 mg by mouth 2 (two) times daily as needed (Pain).   Yes Provider, Historical   UNABLE TO FIND Medical Marijuana - 3 puffs by mouth daily as needed for pain.   Yes Provider, Historical   venlafaxine (EFFEXOR) 50 MG Tab TK 1 T PO  TID 10/2/23  Yes Malena Pinto MD   blood sugar diagnostic Strp Once daily as needed. 2/29/24   Leilani Davey MD   blood-glucose meter kit Use as instructed 2/29/24   Leilani Davey MD   lancets Misc Once daily as needed. 2/29/24   Leilani Davey MD   prednisoLONE acetate (PRED FORTE) 1 % DrpS Place 1 drop into both eyes once daily. 5/6/24   Provider, Historical       Review of Systems:  Review of Systems   Constitutional:  Positive for activity change. Negative for unexpected weight change.   HENT:  Positive for rhinorrhea. Negative for hearing loss and trouble swallowing.    Eyes:  Positive for discharge. Negative for visual disturbance.   Respiratory:  Negative for chest tightness and wheezing.   "  Cardiovascular:  Negative for chest pain and palpitations.   Gastrointestinal:  Positive for blood in stool, constipation and diarrhea. Negative for vomiting.   Endocrine: Negative for polydipsia and polyuria.   Genitourinary:  Negative for difficulty urinating, dysuria, hematuria and menstrual problem.   Musculoskeletal:  Negative for arthralgias and joint swelling.   Neurological:  Negative for weakness and headaches.   Psychiatric/Behavioral:  Negative for confusion and dysphoric mood.        Health Maintainence:   Immunizations:  Health Maintenance         Date Due Completion Date    RSV Vaccine (Age 60+ and Pregnant patients) (1 - 1-dose 60+ series) Never done ---    COVID-19 Vaccine (6 - 2023-24 season) 09/01/2023 7/22/2022    Influenza Vaccine (1) 09/01/2024 10/2/2023    Hemoglobin A1c (Prediabetes) 09/26/2024 9/26/2023    Colorectal Cancer Screening 11/17/2024 11/17/2021    Mammogram 02/12/2025 2/12/2024    DEXA Scan 10/06/2025 10/6/2023    TETANUS VACCINE 05/13/2026 5/13/2016    Lipid Panel 09/26/2028 9/26/2023    Override on 5/9/2017: Done             PHYSICAL EXAM     /80 (BP Location: Left arm, Patient Position: Sitting, BP Method: Small (Manual))   Pulse 80   Ht 5' 3" (1.6 m)   Wt 52.3 kg (115 lb 4.8 oz)   LMP 07/09/2004   SpO2 97%   BMI 20.42 kg/m²  Body mass index is 20.42 kg/m².    Physical Exam  Vitals reviewed.   Constitutional:       Appearance: She is well-developed.   HENT:      Head: Normocephalic.      Right Ear: External ear normal.      Left Ear: External ear normal.      Nose: Nose normal.      Mouth/Throat:      Pharynx: No oropharyngeal exudate.   Eyes:      Pupils: Pupils are equal, round, and reactive to light.   Neck:      Thyroid: No thyromegaly.      Vascular: No JVD.      Trachea: No tracheal deviation.   Cardiovascular:      Rate and Rhythm: Normal rate and regular rhythm.      Heart sounds: Normal heart sounds. No murmur heard.     No friction rub. No gallop. "   Pulmonary:      Effort: Pulmonary effort is normal. No respiratory distress.      Breath sounds: Normal breath sounds. No wheezing or rales.   Abdominal:      General: Bowel sounds are normal. There is no distension.      Palpations: Abdomen is soft.      Tenderness: There is no abdominal tenderness.   Musculoskeletal:         General: No tenderness. Normal range of motion.      Cervical back: Neck supple.   Lymphadenopathy:      Cervical: No cervical adenopathy.   Skin:     General: Skin is warm and dry.      Findings: No rash.   Neurological:      Mental Status: She is alert and oriented to person, place, and time.   Psychiatric:         Behavior: Behavior normal.         LABS     Lab Results   Component Value Date    HGBA1C 4.8 09/26/2023     CMP  Sodium   Date Value Ref Range Status   01/09/2024 139 136 - 145 mmol/L Final     Potassium   Date Value Ref Range Status   01/09/2024 3.8 3.5 - 5.1 mmol/L Final     Chloride   Date Value Ref Range Status   01/09/2024 108 95 - 110 mmol/L Final     CO2   Date Value Ref Range Status   01/09/2024 25 23 - 29 mmol/L Final     Glucose   Date Value Ref Range Status   01/09/2024 88 70 - 110 mg/dL Final     BUN   Date Value Ref Range Status   01/09/2024 13 8 - 23 mg/dL Final     Creatinine   Date Value Ref Range Status   01/09/2024 0.7 0.5 - 1.4 mg/dL Final     Calcium   Date Value Ref Range Status   01/09/2024 8.2 (L) 8.7 - 10.5 mg/dL Final     Total Protein   Date Value Ref Range Status   01/09/2024 5.0 (L) 6.0 - 8.4 g/dL Final     Albumin   Date Value Ref Range Status   01/09/2024 3.0 (L) 3.5 - 5.2 g/dL Final     Total Bilirubin   Date Value Ref Range Status   01/09/2024 0.3 0.1 - 1.0 mg/dL Final     Comment:     For infants and newborns, interpretation of results should be based  on gestational age, weight and in agreement with clinical  observations.    Premature Infant recommended reference ranges:  Up to 24 hours.............<8.0 mg/dL  Up to 48 hours............<12.0  mg/dL  3-5 days..................<15.0 mg/dL  6-29 days.................<15.0 mg/dL       Alkaline Phosphatase   Date Value Ref Range Status   01/09/2024 38 (L) 55 - 135 U/L Final     AST   Date Value Ref Range Status   01/09/2024 18 10 - 40 U/L Final     ALT   Date Value Ref Range Status   01/09/2024 14 10 - 44 U/L Final     Anion Gap   Date Value Ref Range Status   01/09/2024 6 (L) 8 - 16 mmol/L Final     eGFR if    Date Value Ref Range Status   08/15/2019 >60.0 >60 mL/min/1.73 m^2 Final     eGFR if non    Date Value Ref Range Status   08/15/2019 >60.0 >60 mL/min/1.73 m^2 Final     Comment:     Calculation used to obtain the estimated glomerular filtration  rate (eGFR) is the CKD-EPI equation.        Lab Results   Component Value Date    WBC 5.10 01/09/2024    HGB 12.0 01/09/2024    HCT 35.8 (L) 01/09/2024    MCV 97 01/09/2024     01/09/2024     Lab Results   Component Value Date    CHOL 192 09/26/2023    CHOL 193 08/21/2021    CHOL 217 (H) 08/15/2019     Lab Results   Component Value Date    HDL 66 09/26/2023     (H) 08/21/2021    HDL 97 (H) 08/15/2019     Lab Results   Component Value Date    LDLCALC 98.8 09/26/2023    LDLCALC 78.8 08/21/2021    LDLCALC 103.4 08/15/2019     Lab Results   Component Value Date    TRIG 136 09/26/2023    TRIG 56 08/21/2021    TRIG 83 08/15/2019     Lab Results   Component Value Date    CHOLHDL 34.4 09/26/2023    CHOLHDL 53.4 (H) 08/21/2021    CHOLHDL 44.7 08/15/2019     Lab Results   Component Value Date    TSH 4.191 (H) 01/08/2024    [id number] 42 (L) 01/08/2024       ASSESSMENT/PLAN     Ranjana Dominguez is a 64 y.o. female    Syncope, unspecified syncope type- stable. Will cont hydration and compression stockings. Will repeat labs and f/u with cardiology   -     CBC Auto Differential; Future; Expected date: 08/13/2024  -     Comprehensive Metabolic Panel; Future; Expected date: 08/13/2024  -     TSH; Future; Expected date:  08/13/2024  -     T4, Free; Future; Expected date: 08/13/2024  -     Vitamin D; Future; Expected date: 08/13/2024  -     Vitamin B12 Deficiency Panel; Future; Expected date: 08/13/2024  -     Ferritin; Future; Expected date: 08/13/2024  -     Iron and TIBC; Future; Expected date: 08/13/2024  -     Lipid Panel; Future; Expected date: 08/13/2024    Acquired hypothyroidism- stable. Will repeat TFT - cont synthroid   -     CBC Auto Differential; Future; Expected date: 08/13/2024  -     Comprehensive Metabolic Panel; Future; Expected date: 08/13/2024  -     TSH; Future; Expected date: 08/13/2024  -     T4, Free; Future; Expected date: 08/13/2024  -     Vitamin D; Future; Expected date: 08/13/2024  -     Vitamin B12 Deficiency Panel; Future; Expected date: 08/13/2024  -     Ferritin; Future; Expected date: 08/13/2024  -     Iron and TIBC; Future; Expected date: 08/13/2024  -     Lipid Panel; Future; Expected date: 08/13/2024    Copper deficiency- will cont copper supplement. Will monitor   -     CBC Auto Differential; Future; Expected date: 08/13/2024  -     Comprehensive Metabolic Panel; Future; Expected date: 08/13/2024  -     TSH; Future; Expected date: 08/13/2024  -     T4, Free; Future; Expected date: 08/13/2024  -     Vitamin D; Future; Expected date: 08/13/2024  -     Vitamin B12 Deficiency Panel; Future; Expected date: 08/13/2024  -     Ferritin; Future; Expected date: 08/13/2024  -     Iron and TIBC; Future; Expected date: 08/13/2024  -     Lipid Panel; Future; Expected date: 08/13/2024    Anxiety- stable. Will cont effexor and clonazepam as needed   -     CBC Auto Differential; Future; Expected date: 08/13/2024  -     Comprehensive Metabolic Panel; Future; Expected date: 08/13/2024  -     TSH; Future; Expected date: 08/13/2024  -     T4, Free; Future; Expected date: 08/13/2024  -     Vitamin D; Future; Expected date: 08/13/2024  -     Vitamin B12 Deficiency Panel; Future; Expected date: 08/13/2024  -      Ferritin; Future; Expected date: 08/13/2024  -     Iron and TIBC; Future; Expected date: 08/13/2024  -     Lipid Panel; Future; Expected date: 08/13/2024    Mild episode of recurrent major depressive disorder- stable. Will cont effexor and clonazepam as needed   -     CBC Auto Differential; Future; Expected date: 08/13/2024  -     Comprehensive Metabolic Panel; Future; Expected date: 08/13/2024  -     TSH; Future; Expected date: 08/13/2024  -     T4, Free; Future; Expected date: 08/13/2024  -     Vitamin D; Future; Expected date: 08/13/2024  -     Vitamin B12 Deficiency Panel; Future; Expected date: 08/13/2024  -     Ferritin; Future; Expected date: 08/13/2024  -     Iron and TIBC; Future; Expected date: 08/13/2024  -     Lipid Panel; Future; Expected date: 08/13/2024    Hemochromatosis carrier- stable. Will monitor   -     CBC Auto Differential; Future; Expected date: 08/13/2024  -     Comprehensive Metabolic Panel; Future; Expected date: 08/13/2024  -     TSH; Future; Expected date: 08/13/2024  -     T4, Free; Future; Expected date: 08/13/2024  -     Vitamin D; Future; Expected date: 08/13/2024  -     Vitamin B12 Deficiency Panel; Future; Expected date: 08/13/2024  -     Ferritin; Future; Expected date: 08/13/2024  -     Iron and TIBC; Future; Expected date: 08/13/2024  -     Lipid Panel; Future; Expected date: 08/13/2024    Iron excess- stable. Will repeat Iron levels   -     CBC Auto Differential; Future; Expected date: 08/13/2024  -     Comprehensive Metabolic Panel; Future; Expected date: 08/13/2024  -     TSH; Future; Expected date: 08/13/2024  -     T4, Free; Future; Expected date: 08/13/2024  -     Vitamin D; Future; Expected date: 08/13/2024  -     Vitamin B12 Deficiency Panel; Future; Expected date: 08/13/2024  -     Ferritin; Future; Expected date: 08/13/2024  -     Iron and TIBC; Future; Expected date: 08/13/2024  -     Lipid Panel; Future; Expected date: 08/13/2024    Osteoporosis without current  pathological fracture, unspecified osteoporosis type- stable. Reviewed dexa. Cont calcium   -     CBC Auto Differential; Future; Expected date: 08/13/2024  -     Comprehensive Metabolic Panel; Future; Expected date: 08/13/2024  -     TSH; Future; Expected date: 08/13/2024  -     T4, Free; Future; Expected date: 08/13/2024  -     Vitamin D; Future; Expected date: 08/13/2024  -     Vitamin B12 Deficiency Panel; Future; Expected date: 08/13/2024  -     Ferritin; Future; Expected date: 08/13/2024  -     Iron and TIBC; Future; Expected date: 08/13/2024  -     Lipid Panel; Future; Expected date: 08/13/2024    Mixed hyperlipidemia- stable. Will monitor FLP   -     CBC Auto Differential; Future; Expected date: 08/13/2024  -     Comprehensive Metabolic Panel; Future; Expected date: 08/13/2024  -     TSH; Future; Expected date: 08/13/2024  -     T4, Free; Future; Expected date: 08/13/2024  -     Vitamin D; Future; Expected date: 08/13/2024  -     Vitamin B12 Deficiency Panel; Future; Expected date: 08/13/2024  -     Ferritin; Future; Expected date: 08/13/2024  -     Iron and TIBC; Future; Expected date: 08/13/2024  -     Lipid Panel; Future; Expected date: 08/13/2024    Lumbar radiculopathy- advised HEP and stretches.   -     CBC Auto Differential; Future; Expected date: 08/13/2024  -     Comprehensive Metabolic Panel; Future; Expected date: 08/13/2024  -     TSH; Future; Expected date: 08/13/2024  -     T4, Free; Future; Expected date: 08/13/2024  -     Vitamin D; Future; Expected date: 08/13/2024  -     Vitamin B12 Deficiency Panel; Future; Expected date: 08/13/2024  -     Ferritin; Future; Expected date: 08/13/2024  -     Iron and TIBC; Future; Expected date: 08/13/2024  -     Lipid Panel; Future; Expected date: 08/13/2024    Encounter for colorectal cancer screening  -     Ambulatory referral/consult to Endo Procedure ; Future; Expected date: 08/14/2024  -     CBC Auto Differential; Future; Expected date:  08/13/2024  -     Comprehensive Metabolic Panel; Future; Expected date: 08/13/2024  -     TSH; Future; Expected date: 08/13/2024  -     T4, Free; Future; Expected date: 08/13/2024  -     Vitamin D; Future; Expected date: 08/13/2024  -     Vitamin B12 Deficiency Panel; Future; Expected date: 08/13/2024  -     Ferritin; Future; Expected date: 08/13/2024  -     Iron and TIBC; Future; Expected date: 08/13/2024  -     Lipid Panel; Future; Expected date: 08/13/2024    History of bariatric surgery:  Gastric sleeve- f/u with bariatric surgery. Will monitor labs.   -     TSH; Future; Expected date: 08/13/2024  -     T4, Free; Future; Expected date: 08/13/2024  -     Vitamin D; Future; Expected date: 08/13/2024  -     Vitamin B12 Deficiency Panel; Future; Expected date: 08/13/2024  -     Ferritin; Future; Expected date: 08/13/2024  -     Iron and TIBC; Future; Expected date: 08/13/2024    Osteopenia of multiple sites: see DEXA 7/21; stable 10/23  -     Vitamin B12 Deficiency Panel; Future; Expected date: 08/13/2024    Anemia due to folic acid deficiency, unspecified deficiency type  -     Vitamin B12 Deficiency Panel; Future; Expected date: 08/13/2024    Other orders  -     pneumoc 20-grzegorz conj-dip cr(PF) (PREVNAR-20 (PF)) injection Syrg 0.5 mL           Follow up with PCP     Visit today included increased complexity associated with the care of the episodic problem syncope addressed and managing the longitudinal care of the patient due to the serious and/or complex managed problem(s) HLD, osteopenia, anxiety and depression.     Lynnette Pathak MN, APRN, FNP-c   Department of Internal Medicine - [facility] [address]  9:20 AM

## 2024-08-14 ENCOUNTER — CLINICAL SUPPORT (OUTPATIENT)
Dept: ENDOSCOPY | Facility: HOSPITAL | Age: 64
End: 2024-08-14
Payer: MEDICARE

## 2024-08-14 ENCOUNTER — TELEPHONE (OUTPATIENT)
Dept: ENDOSCOPY | Facility: HOSPITAL | Age: 64
End: 2024-08-14

## 2024-08-14 ENCOUNTER — PATIENT MESSAGE (OUTPATIENT)
Dept: INTERNAL MEDICINE | Facility: CLINIC | Age: 64
End: 2024-08-14
Payer: MEDICARE

## 2024-08-14 DIAGNOSIS — Z12.11 ENCOUNTER FOR COLORECTAL CANCER SCREENING: ICD-10-CM

## 2024-08-14 DIAGNOSIS — Z86.010 HISTORY OF COLON POLYPS: Primary | ICD-10-CM

## 2024-08-14 DIAGNOSIS — Z12.12 ENCOUNTER FOR COLORECTAL CANCER SCREENING: ICD-10-CM

## 2024-08-14 RX ORDER — SODIUM, POTASSIUM,MAG SULFATES 17.5-3.13G
1 SOLUTION, RECONSTITUTED, ORAL ORAL DAILY
Qty: 1 KIT | Refills: 0 | Status: SHIPPED | OUTPATIENT
Start: 2024-08-14 | End: 2024-08-16

## 2024-08-14 NOTE — TELEPHONE ENCOUNTER
Spoke to patient to schedule procedure(s) Colonoscopy       Physician to perform procedure(s) Dr. ESCOBAR Amin  Date of Procedure (s) 10/22/24  Arrival Time 6:00 AM  Time of Procedure(s) 7:00 AM   Location of Procedure(s) [facility] 4th Floor  Type of Rx Prep sent to patient: Suprep  Instructions provided to patient via [facility]    Patient was informed on the following information and verbalized understanding. Screening questionnaire reviewed with patient and complete. If procedure requires anesthesia, a responsible adult needs to be present to accompany the patient home, patient cannot drive after receiving anesthesia. Appointment details are tentative, especially check-in time. Patient will receive a prep-op call 7 days prior to confirm check-in time for procedure. If applicable the patient should contact their pharmacy to verify Rx for procedure prep is ready for pick-up. Patient was advised to call the scheduling department at [phone] if pharmacy states no Rx is available. Patient was advised to call the endoscopy scheduling department if any questions or concerns arise.      [facility] Scheduling Department

## 2024-08-15 LAB — VIT B12 SERPL-MCNC: 693 NG/L (ref 180–914)

## 2024-08-19 ENCOUNTER — TELEPHONE (OUTPATIENT)
Dept: CARDIOLOGY | Facility: HOSPITAL | Age: 64
End: 2024-08-19
Payer: MEDICARE

## 2024-08-27 ENCOUNTER — PATIENT MESSAGE (OUTPATIENT)
Dept: ORTHOPEDICS | Facility: CLINIC | Age: 64
End: 2024-08-27
Payer: MEDICARE

## 2024-09-04 ENCOUNTER — PATIENT MESSAGE (OUTPATIENT)
Dept: CARDIOLOGY | Facility: CLINIC | Age: 64
End: 2024-09-04
Payer: MEDICARE

## 2024-09-18 ENCOUNTER — OFFICE VISIT (OUTPATIENT)
Dept: OBSTETRICS AND GYNECOLOGY | Facility: CLINIC | Age: 64
End: 2024-09-18
Payer: MEDICARE

## 2024-09-18 VITALS
DIASTOLIC BLOOD PRESSURE: 72 MMHG | BODY MASS INDEX: 20.46 KG/M2 | SYSTOLIC BLOOD PRESSURE: 124 MMHG | WEIGHT: 115.5 LBS | HEIGHT: 63 IN

## 2024-09-18 DIAGNOSIS — Z01.419 WELL WOMAN EXAM WITH ROUTINE GYNECOLOGICAL EXAM: Primary | ICD-10-CM

## 2024-09-18 DIAGNOSIS — N39.41 URGE URINARY INCONTINENCE: ICD-10-CM

## 2024-09-18 PROCEDURE — 99999 PR PBB SHADOW E&M-EST. PATIENT-LVL III: CPT | Mod: PBBFAC,HCNC,, | Performed by: OBSTETRICS & GYNECOLOGY

## 2024-09-18 NOTE — PROGRESS NOTES
History & Physical  Gynecology      SUBJECTIVE:     Chief Complaint: Well Woman (Urinary urgency /Speak about labido medicine /And vaginal dryness )       History of Present Illness:  Annual Exam-Postmenopausal  Patient presents for annual exam.  Patient denies post-menopausal vaginal bleeding. The patient is somewhat sexually active. The patient is not taking hormone replacement therapy.  The patient participates in regular exercise: yes.  She does not smoke.     The patient also presents with a problem:  The patient has complaints today of urinary urgency- does have problems with leaking.  No problems with leaking with cough/sneeze.  She first started to notice this over the past 6-8 months.  She is also struggling somewhat with decreased libido.    GYN screening history:  paphpv  Mammogram history: 2024  Colonoscopy history: scheduled  Dexa history: osteopenia, 10/2023    FH:   Breast cancer: neg  Colon cancer: father  Ovarian cancer: neg  Bladder cancer: mother    Review of patient's allergies indicates:   Allergen Reactions    Decongestant tablet     Hydrocodone Itching       Past Medical History:   Diagnosis Date    Allergy     Anxiety     Colon polyp, hyperplastic: see colonoscopy 2018    Depression     Family history of colon cancer: father in his 60s 2016    Glucose intolerance (impaired glucose tolerance) 2012    Headache(784.0)     History of bariatric surgery 2018    Hypothyroidism     Lumbar radiculopathy 10/25/2022    Memory loss     Menopause syndrome     Mitral valve disorders(424.0) 2012    Obesity     Other and unspecified hyperlipidemia     Sleep apnea: per sleep study 2015; CPAP at 11 cm recommended 2015    Special screening for malignant neoplasms, colon 2015    Syncope 2024    Thyroid disease      Past Surgical History:   Procedure Laterality Date    APPENDECTOMY  2015    BREAST CYST ASPIRATION       SECTION, CLASSIC       COLONOSCOPY N/A 2018    Procedure: COLONOSCOPY;  Surgeon: Hansel Shell MD;  Location: [facility] ENDO (4TH FLR);  Service: Endoscopy;  Laterality: N/A;    COLONOSCOPY N/A 2021    Procedure: COLONOSCOPY;  Surgeon: Martínez Amin MD;  Location: [facility] ENDO (4TH FLR);  Service: Endoscopy;  Laterality: N/A;  8/10 - LVM about cancelling procedure- sm  pt completed COVID vaccine- see Immunization record in chart-rb    EPIDURAL STEROID INJECTION INTO LUMBAR SPINE Bilateral 2023    Procedure: B/L L4-5 TFESI;  Surgeon: Woo Thomas MD;  Location: [facility] PAIN MANAGEMENT;  Service: Pain Management;  Laterality: Bilateral;  20 MINS    EPIDURAL STEROID INJECTION INTO LUMBAR SPINE Left 10/26/2023    Procedure: LT L2-3 TFESI;  Surgeon: Jorge Shipman DO;  Location: [facility] PAIN MANAGEMENT;  Service: Pain Management;  Laterality: Left;  15 mins    FRACTURE SURGERY      HERNIA REPAIR      umbilical hernia    NOSE SURGERY      SLEEVE GASTROPLASTY      TRANSFORAMINAL EPIDURAL INJECTION OF STEROID Bilateral 2022    Procedure: TFESI (B/L) L4/5;  Surgeon: Jorge Shipman DO;  Location: [facility] OR;  Service: Pain Management;  Laterality: Bilateral;     OB History          2    Para   2    Term   1       1    AB   0    Living   2         SAB   0    IAB        Ectopic        Multiple        Live Births   1               Family History   Problem Relation Name Age of Onset    Sleep apnea Mother      Cancer Mother          bladder sarcoma, smoking    Sleep apnea Father      Depression Father          Bipolar    Cancer Father          colon    Colon cancer Father      Depression Sister          suicide    No Known Problems Brother      No Known Problems Daughter Arlen     Cancer Maternal Grandmother          lymphoma    Heart disease Paternal Grandmother      Melanoma Neg Hx      Skin cancer Neg Hx      Breast cancer Neg Hx      Ovarian cancer Neg Hx       Social History     Tobacco Use     Smoking status: Former     Current packs/day: 0.00     Average packs/day: 0.5 packs/day for 7.0 years (3.5 ttl pk-yrs)     Types: Cigarettes     Start date: 1975     Quit date: 1982     Years since quittin.8    Smokeless tobacco: Never   Substance Use Topics    Alcohol use: Yes     Alcohol/week: 0.0 standard drinks of alcohol     Types: 2 - 4 Glasses of wine per week     Comment: weekends    Drug use: No       Current Outpatient Medications   Medication Sig    BIOFREEZE, MENTHOL, TOP Apply topically daily as needed (Pain).    calcium carbonate (CALCIUM 500) 500 mg calcium (1,250 mg) chewable tablet Take 2 tablets by mouth every evening.    cetirizine (ZYRTEC) 10 MG tablet Take 10 mg by mouth once daily.    cholecalciferol, vitamin D3, 125 mcg (5,000 unit) capsule Take 3 x weekly (Patient taking differently: Take 2 capsules by mouth once daily. Take 3 x weekly)    clonazePAM (KLONOPIN) 0.5 MG tablet Take 1 tablet (0.5 mg total) by mouth once daily. (Patient taking differently: Take 0.5 mg by mouth every evening.)    copper gluconate 2 mg Cap Take 2 mg by mouth once daily.    cyanocobalamin (VITAMIN B-12) 1000 MCG tablet Take 3 x weekly (Patient taking differently: Take 2 tablets by mouth once daily. Take 3 x weekly)    cycloSPORINE (RESTASIS) 0.05 % ophthalmic emulsion Place 1 drop into both eyes 2 (two) times daily.    lanolin/mineral oil/petrolatum (ARTIFICIAL TEARS OPHT) Place 2 drops into both eyes daily as needed (Dry eyes).    levothyroxine (SYNTHROID) 112 MCG tablet Take 1 tablet (112 mcg total) by mouth before breakfast.    multivit-min/iron/folic acid/K (BARIATRIC MULTIVITAMINS ORAL) Take 1 tablet by mouth once daily.    mupirocin (BACTROBAN) 2 % ointment Apply topically 3 (three) times daily. (Patient taking differently: Apply topically as needed.)    naproxen (NAPROSYN) 500 MG tablet Take 500 mg by mouth 2 (two) times daily as needed (Pain).    UNABLE TO FIND Medical Marijuana - 3 puffs by  mouth daily as needed for pain.    venlafaxine (EFFEXOR) 50 MG Tab TK 1 T PO  TID    amoxicillin (AMOXIL) 500 MG Tab SMARTSI Tablet(s) By Mouth (Patient not taking: Reported on 2024)     No current facility-administered medications for this visit.     Facility-Administered Medications Ordered in Other Visits   Medication    dextrose 5 % and 0.45 % NaCl infusion    dextrose 5 % and 0.45 % NaCl infusion    sodium chloride 0.9% flush 3 mL       Review of Systems:  Review of Systems   Constitutional:  Negative for appetite change, fever and unexpected weight change.   Respiratory:  Negative for shortness of breath.    Cardiovascular:  Negative for chest pain.   Gastrointestinal:  Negative for nausea and vomiting.   Genitourinary:  Negative for pelvic pain, vaginal bleeding, vaginal discharge, vaginal pain and postmenopausal bleeding.   Integumentary:  Negative for breast mass.   Breast: Negative for lump and mass       OBJECTIVE:     Physical Exam:  Physical Exam  Vitals and nursing note reviewed.   Constitutional:       Appearance: She is well-developed.   Cardiovascular:      Rate and Rhythm: Normal rate and regular rhythm.      Heart sounds: Normal heart sounds.   Pulmonary:      Effort: Pulmonary effort is normal.      Breath sounds: Normal breath sounds.   Chest:   Breasts:     Breasts are symmetrical.      Right: No inverted nipple, mass, nipple discharge, skin change or tenderness.      Left: No inverted nipple, mass, nipple discharge, skin change or tenderness.   Abdominal:      Palpations: Abdomen is soft.   Genitourinary:     General: Normal vulva.      Labia:         Right: No rash, tenderness, lesion or injury.         Left: No rash, tenderness, lesion or injury.       Urethra: No prolapse, urethral pain, urethral swelling or urethral lesion.      Vagina: Normal. No signs of injury and foreign body. No vaginal discharge, erythema, tenderness or bleeding.      Cervix: No cervical motion tenderness,  discharge or friability.      Uterus: Not deviated, not enlarged, not fixed and not tender.       Adnexa:         Right: No mass, tenderness or fullness.          Left: No mass, tenderness or fullness.        Rectum: No anal fissure or external hemorrhoid.      Comments: Urethral meatus: normal size, anterior vaginal wall with no prolapse, no lesions  Bladder: no fullness, masses or tenderness  Musculoskeletal:      Cervical back: Normal range of motion.   Neurological:      Mental Status: She is alert and oriented to person, place, and time.   Psychiatric:         Behavior: Behavior normal.         Thought Content: Thought content normal.         Judgment: Judgment normal.         Chaperoned by: Karen    ASSESSMENT:       ICD-10-CM ICD-9-CM    1. Well woman exam with routine gynecological exam  Z01.419 V72.31       2. Urge urinary incontinence  N39.41 788.31                  Plan:      Ranjana was seen today for well woman.    Diagnoses and all orders for this visit:    Well woman exam with routine gynecological exam    Urge urinary incontinence            No orders of the defined types were placed in this encounter.      Well Woman:   - Pap smear: up to date- no longer indicated  - Mammogram: up to date  - Colonoscopy: up to date  - Dexa: up to date  - Immunizations: not discussed  - Labs: with pcp  - Exercise encouraged    UUI:  - reviewed bladder irritants, list sent to portal.    - discussed voiding diary and timed voiding  - reviewed kegels and offered PFPT  - consider referral to urogyn if no relief; reviewed medications and side effects if patient desires to pursue this    Follow up in one year for annual, or prn.    Kelly De Luna

## 2024-09-18 NOTE — PATIENT INSTRUCTIONS
Bladder Irritants  Certain foods and drinks have been associated with worsening symptoms of urinary frequency, urgency, urge incontinence, or bladder pain. If you suffer from any of these conditions, you may wish to try eliminating one or more of these foods from your diet and see if your symptoms improve. If bladder symptoms are related to dietary factors, strict adherence to a diet thateliminates the food should bring marked relief in 10 days. Once you are feeling better, you can begin to add foods back into your diet, one at a time. If symptoms return, you will be able to identify the irritant. As you add foods back to your diet it is very important that you drink significant amounts of water.    -----------------------------------------------------------------------------------------------  List of Common Bladder Irritants*  Alcoholic beverages  Apples and apple juice  Cantaloupe  Carbonated beverages  Chili and spicy foods  Chocolate  Citrus fruit  Coffee (including decaffeinated)  Cranberries and cranberry juice  Grapes  Guava  Milk Products: milk, cheese, cottage cheese, yogurt, ice cream  Peaches  Pineapple  Plums  Strawberries  Sugar especially artificial sweeteners, saccharin, aspartame, corn sweeteners, honey, fructose, sucrose, lactose  Tea  Tomatoes and tomato juice  Vitamin B complex  Vinegar  *Most people are not sensitive to ALL of these products; your goal is to find the foods that make YOUR symptoms worse.  ---------------------------------------------------------------------------------------------------    Low-acid fruit substitutions include apricots, papaya, pears and watermelon. Coffee drinkers can drink Kava or other lowacid instant drinks. Tea drinkers can substitute non-citrus herbal and sun brewed teas. Calcium carbonate co-buffered with calcium ascorbate can be substituted for Vitamin C. Prelief is a dietary supplement that works as an acid blocker for the bladder.    Where to get more  information:        Overcoming Bladder Disorders by Monserrat Madison and Yassine Marino, 1990        You Dont Have to Live with Cystitis! By Daylin Block, 1988  [email or web address]://[email or web address]

## 2024-09-19 DIAGNOSIS — R55 SYNCOPE AND COLLAPSE: Primary | ICD-10-CM

## 2024-10-15 ENCOUNTER — TELEPHONE (OUTPATIENT)
Dept: ENDOSCOPY | Facility: HOSPITAL | Age: 64
End: 2024-10-15
Payer: MEDICARE

## 2024-10-18 ENCOUNTER — TELEPHONE (OUTPATIENT)
Dept: ENDOSCOPY | Facility: HOSPITAL | Age: 64
End: 2024-10-18
Payer: MEDICARE

## 2024-10-18 NOTE — TELEPHONE ENCOUNTER
Contacted Pt for Endoscopy precall to confirm scheduled procedure(s) Colonoscopy on 10/22/24. Pt did not answer. Left voicemail for pt to call Endoscopy Scheduling to confirm.

## 2024-10-21 ENCOUNTER — TELEPHONE (OUTPATIENT)
Dept: ENDOSCOPY | Facility: HOSPITAL | Age: 64
End: 2024-10-21
Payer: MEDICARE

## 2024-10-21 ENCOUNTER — ANESTHESIA EVENT (OUTPATIENT)
Dept: ENDOSCOPY | Facility: HOSPITAL | Age: 64
End: 2024-10-21
Payer: MEDICARE

## 2024-10-21 NOTE — TELEPHONE ENCOUNTER
Spoke to patient for pre-call to confirm scheduled Colonoscopy and patient verbalized understanding of the following:       Date of Procedure (s)  verified 10/22/24  Arrival Time 6:00 AM verified.  Location of Procedure(s) [facility] 4th Floor verified.  NPO status reinforced. Ok to continue clear liquids up until 2 hours prior to the Endoscopy procedure.     Pt confirmed receipt of prep instructions and Rx prep (if applicable).  Instructions provided to patient via [facility]  Pt confirmed ride home after procedure if procedure requires anesthesia.   Pre-call screening questionnaire reviewed and completed with patient.   Appointment details are tentative, including check-in time.  If the patient begins taking any blood thinning medications, injectable weight loss/diabetes medications (other than insulin), or Adipex (phentermine) patient was instructed to contact the endoscopy scheduling department as soon as possible.  Patient was advised to call the endoscopy scheduling department if any questions or concerns arise.       [facility] Scheduling Department

## 2024-10-22 ENCOUNTER — ANESTHESIA (OUTPATIENT)
Dept: ENDOSCOPY | Facility: HOSPITAL | Age: 64
End: 2024-10-22
Payer: MEDICARE

## 2024-10-22 ENCOUNTER — HOSPITAL ENCOUNTER (OUTPATIENT)
Facility: HOSPITAL | Age: 64
Discharge: HOME | End: 2024-10-22
Attending: STUDENT IN AN ORGANIZED HEALTH CARE EDUCATION/TRAINING PROGRAM | Admitting: STUDENT IN AN ORGANIZED HEALTH CARE EDUCATION/TRAINING PROGRAM
Payer: MEDICARE

## 2024-10-22 VITALS
TEMPERATURE: 98 F | BODY MASS INDEX: 19.49 KG/M2 | HEART RATE: 70 BPM | HEIGHT: 63 IN | WEIGHT: 110 LBS | DIASTOLIC BLOOD PRESSURE: 83 MMHG | RESPIRATION RATE: 17 BRPM | OXYGEN SATURATION: 99 % | SYSTOLIC BLOOD PRESSURE: 133 MMHG

## 2024-10-22 DIAGNOSIS — Z13.9 SCREENING DUE: ICD-10-CM

## 2024-10-22 PROCEDURE — 37000009 HC ANESTHESIA EA ADD 15 MINS: Mod: HCNC | Performed by: STUDENT IN AN ORGANIZED HEALTH CARE EDUCATION/TRAINING PROGRAM

## 2024-10-22 PROCEDURE — 45385 COLONOSCOPY W/LESION REMOVAL: CPT | Mod: PT,HCNC,, | Performed by: STUDENT IN AN ORGANIZED HEALTH CARE EDUCATION/TRAINING PROGRAM

## 2024-10-22 PROCEDURE — 45385 COLONOSCOPY W/LESION REMOVAL: CPT | Mod: PT,HCNC | Performed by: STUDENT IN AN ORGANIZED HEALTH CARE EDUCATION/TRAINING PROGRAM

## 2024-10-22 PROCEDURE — 88305 TISSUE EXAM BY PATHOLOGIST: CPT | Mod: 26,HCNC,, | Performed by: PATHOLOGY

## 2024-10-22 PROCEDURE — 88305 TISSUE EXAM BY PATHOLOGIST: CPT | Mod: HCNC | Performed by: PATHOLOGY

## 2024-10-22 PROCEDURE — E9220 PRA ENDO ANESTHESIA: HCPCS | Mod: PT,HCNC,, | Performed by: NURSE ANESTHETIST, CERTIFIED REGISTERED

## 2024-10-22 PROCEDURE — 37000008 HC ANESTHESIA 1ST 15 MINUTES: Mod: HCNC | Performed by: STUDENT IN AN ORGANIZED HEALTH CARE EDUCATION/TRAINING PROGRAM

## 2024-10-22 PROCEDURE — 27201089 HC SNARE, DISP (ANY): Mod: HCNC | Performed by: STUDENT IN AN ORGANIZED HEALTH CARE EDUCATION/TRAINING PROGRAM

## 2024-10-22 PROCEDURE — 63600175 PHARM REV CODE 636 W HCPCS: Mod: HCNC | Performed by: NURSE ANESTHETIST, CERTIFIED REGISTERED

## 2024-10-22 RX ORDER — PROPOFOL 10 MG/ML
VIAL (ML) INTRAVENOUS
Status: DISCONTINUED | OUTPATIENT
Start: 2024-10-22 | End: 2024-10-22

## 2024-10-22 RX ORDER — PROPOFOL 10 MG/ML
VIAL (ML) INTRAVENOUS CONTINUOUS PRN
Status: DISCONTINUED | OUTPATIENT
Start: 2024-10-22 | End: 2024-10-22

## 2024-10-22 RX ORDER — LIDOCAINE HYDROCHLORIDE 20 MG/ML
INJECTION INTRAVENOUS
Status: DISCONTINUED | OUTPATIENT
Start: 2024-10-22 | End: 2024-10-22

## 2024-10-22 RX ADMIN — LIDOCAINE HYDROCHLORIDE 50 MG: 20 INJECTION INTRAVENOUS at 07:10

## 2024-10-22 RX ADMIN — PROPOFOL 50 MG: 10 INJECTION, EMULSION INTRAVENOUS at 07:10

## 2024-10-22 RX ADMIN — PROPOFOL 150 MCG/KG/MIN: 10 INJECTION, EMULSION INTRAVENOUS at 07:10

## 2024-10-22 NOTE — ANESTHESIA POSTPROCEDURE EVALUATION
Anesthesia Post Evaluation    Patient: Ranjana Dominguez    Procedure(s) Performed: Procedure(s) (LRB):  COLONOSCOPY (N/A)    Final Anesthesia Type: general      Patient location during evaluation: GI PACU  Patient participation: Yes- Able to Participate  Level of consciousness: awake and alert and oriented  Post-procedure vital signs: reviewed and stable  Pain management: adequate  Airway patency: patent    PONV status at discharge: No PONV  Anesthetic complications: no      Cardiovascular status: hemodynamically stable  Respiratory status: unassisted, spontaneous ventilation and room air  Hydration status: euvolemic  Follow-up not needed.              Vitals Value Taken Time   /72 10/22/24 0735   Temp 36.5 °C (97.7 °F) 10/22/24 0735   Pulse 76 10/22/24 0735   Resp 17 10/22/24 0735   SpO2 96 % 10/22/24 0735         No case tracking events are documented in the log.      Pain/Alexsander Score: Alexsander Score: 9 (10/22/2024  7:35 AM)

## 2024-10-22 NOTE — TRANSFER OF CARE
"Anesthesia Transfer of Care Note    Patient: Ranjana Dominguez    Procedure(s) Performed: Procedure(s) (LRB):  COLONOSCOPY (N/A)    Patient location: GI    Anesthesia Type: general    Transport from OR: Transported from OR on room air with adequate spontaneous ventilation    Post pain: adequate analgesia    Post assessment: no apparent anesthetic complications and tolerated procedure well    Post vital signs: stable    Level of consciousness: lethargic    Nausea/Vomiting: no nausea/vomiting    Complications: none    Transfer of care protocol was followed      Last vitals: Visit Vitals  /72 (BP Location: Left arm, Patient Position: Lying)   Pulse 76   Temp 36.5 °C (97.7 °F) (Temporal)   Resp 17   Ht 5' 3" (1.6 m)   Wt 49.9 kg (110 lb)   LMP 07/09/2004   SpO2 96%   Breastfeeding No   BMI 19.49 kg/m²     "

## 2024-10-22 NOTE — ANESTHESIA PREPROCEDURE EVALUATION
10/22/2024  Ranjana Dominguez is a 64 y.o., female.  Past Medical History:   Diagnosis Date    Allergy     Anxiety     Colon polyp, hyperplastic: see colonoscopy 2018    Depression     Family history of colon cancer: father in his 60s 2016    Glucose intolerance (impaired glucose tolerance) 2012    Headache(784.0)     History of bariatric surgery 2018    Hypothyroidism     Lumbar radiculopathy 10/25/2022    Memory loss     Menopause syndrome     Mitral valve disorders(424.0) 2012    Obesity     Other and unspecified hyperlipidemia     Sleep apnea: per sleep study 2015; CPAP at 11 cm recommended 2015    Special screening for malignant neoplasms, colon 2015    Syncope 2024    Thyroid disease        Past Surgical History:   Procedure Laterality Date    APPENDECTOMY  2015    BREAST CYST ASPIRATION       SECTION, CLASSIC      COLONOSCOPY N/A 2018    Procedure: COLONOSCOPY;  Surgeon: Hansel Shell MD;  [facility]  Service: Endoscopy;  Laterality: N/A;    COLONOSCOPY N/A 2021    Procedure: COLONOSCOPY;  Surgeon: Martínez Amin MD;  [facility]  Service: Endoscopy;  Laterality: N/A;  8/10 - LVM about cancelling procedure- sm  pt completed COVID vaccine- see Immunization record in chart-rb    EPIDURAL STEROID INJECTION INTO LUMBAR SPINE Bilateral 2023    Procedure: B/L L4-5 TFESI;  Surgeon: Woo Thomas MD;  [facility];  Service: Pain Management;  Laterality: Bilateral;  20 MINS    EPIDURAL STEROID INJECTION INTO LUMBAR SPINE Left 10/26/2023    Procedure: LT L2-3 TFESI;  Surgeon: Jorge Shipman DO;  [facility];  Service: Pain Management;  Laterality: Left;  15 mins    FRACTURE SURGERY      HERNIA REPAIR      umbilical hernia     NOSE SURGERY      SLEEVE GASTROPLASTY      TRANSFORAMINAL EPIDURAL INJECTION OF STEROID Bilateral 2022    Procedure: TFESI (B/L) L4/5;  Surgeon: Jorge Shipman DO;  Location: [facility] OR;  Service: Pain Management;  Laterality: Bilateral;       Family History   Problem Relation Name Age of Onset    Sleep apnea Mother      Cancer Mother          bladder sarcoma, smoking    Sleep apnea Father      Depression Father          Bipolar    Cancer Father          colon    Colon cancer Father      Depression Sister          suicide    No Known Problems Brother      No Known Problems Daughter Arlen     Cancer Maternal Grandmother          lymphoma    Heart disease Paternal Grandmother      Melanoma Neg Hx      Skin cancer Neg Hx      Breast cancer Neg Hx      Ovarian cancer Neg Hx         Social History     Socioeconomic History    Marital status:     Number of children: 1   Occupational History    Occupation: [employer]   Tobacco Use    Smoking status: Former     Current packs/day: 0.00     Average packs/day: 0.5 packs/day for 7.0 years (3.5 ttl pk-yrs)     Types: Cigarettes     Start date: 1975     Quit date: 1982     Years since quittin.9    Smokeless tobacco: Never   Substance and Sexual Activity    Alcohol use: Yes     Alcohol/week: 0.0 standard drinks of alcohol     Types: 2 - 4 Glasses of wine per week     Comment: weekends    Drug use: No    Sexual activity: Yes     Partners: Male   Other Topics Concern    Are you pregnant or think you may be? No    Breast-feeding No     Social Drivers of Health     Financial Resource Strain: Patient Declined (2024)    Overall Financial Resource Strain (CARDIA)     Difficulty of Paying Living Expenses: Patient declined   Food Insecurity: Patient Declined (2024)    Hunger Vital Sign     Worried About Running Out of Food in the Last Year: Patient declined     Ran Out of Food in the Last Year: Patient declined   Transportation Needs:  Patient Declined (1/12/2024)    PRAPARE - Transportation     Lack of Transportation (Medical): Patient declined     Lack of Transportation (Non-Medical): Patient declined   Physical Activity: Unknown (1/12/2024)    Exercise Vital Sign     Days of Exercise per Week: Patient declined   Recent Concern: Physical Activity - Inactive (10/16/2023)    Exercise Vital Sign     Days of Exercise per Week: 0 days     Minutes of Exercise per Session: 0 min   Stress: Patient Declined (1/12/2024)    [place] [employer] - Occupational Stress Questionnaire     Feeling of Stress : Patient declined   Housing Stability: Patient Declined (1/12/2024)    Housing Stability Vital Sign     Unable to Pay for Housing in the Last Year: Patient declined     Number of Places Lived in the Last Year: 1     Unstable Housing in the Last Year: Patient declined       No current facility-administered medications for this encounter.     Facility-Administered Medications Ordered in Other Encounters   Medication Dose Route Frequency Provider Last Rate Last Admin    dextrose 5 % and 0.45 % NaCl infusion   Intravenous Continuous Hansel Shell MD   New Bag at 08/13/18 0843    dextrose 5 % and 0.45 % NaCl infusion   Intravenous Continuous Hansel Shell MD 20 mL/hr at 08/13/18 0755 New Bag at 08/13/18 0755    sodium chloride 0.9% flush 3 mL  3 mL Intravenous PRN Hansel Shell MD           Review of patient's allergies indicates:   Allergen Reactions    Decongestant tablet     Hydrocodone Itching           Pre-op Assessment    I have reviewed the Patient Summary Reports.     I have reviewed the Nursing Notes. I have reviewed the NPO Status.   I have reviewed the Medications.     Review of Systems  Anesthesia Hx:  No problems with previous Anesthesia   History of prior surgery of interest to airway management or planning:  Previous anesthesia: General        Denies Family Hx of Anesthesia complications.    Denies Personal Hx  of Anesthesia complications.                    Social:  Social Alcohol Use       Hematology/Oncology:  Hematology Normal   Oncology Normal                                   EENT/Dental:  EENT/Dental Normal           Cardiovascular:  Cardiovascular Normal Exercise tolerance: good                                             Pulmonary:        Sleep Apnea                Renal/:  Renal/ Normal                 Hepatic/GI:  Hepatic/GI Normal                    Musculoskeletal:         Spine Disorders: lumbar            Neurological:      Headaches                                 Endocrine:  Endocrine Normal            Dermatological:  Skin Normal    Psych:    depression                Physical Exam  General: Well nourished, Cooperative, Alert and Oriented    Airway:  Mallampati: II / II  Mouth Opening: Normal  TM Distance: Normal  Tongue: Normal  Neck ROM: Normal ROM    Dental:  Intact        Anesthesia Plan  Type of Anesthesia, risks & benefits discussed:    Anesthesia Type: Gen Natural Airway  Intra-op Monitoring Plan: Standard ASA Monitors  Post Op Pain Control Plan: multimodal analgesia and IV/PO Opioids PRN  Induction:  IV  Informed Consent: Informed consent signed with the Patient and all parties understand the risks and agree with anesthesia plan.  All questions answered. Patient consented to blood products? No  ASA Score: 2  Day of Surgery Review of History & Physical: H&P Update referred to the surgeon/provider.    Ready For Surgery From Anesthesia Perspective.     .

## 2024-10-23 LAB
FINAL PATHOLOGIC DIAGNOSIS: NORMAL
GROSS: NORMAL
Lab: NORMAL

## 2024-11-19 ENCOUNTER — HOSPITAL ENCOUNTER (OUTPATIENT)
Dept: CARDIOLOGY | Facility: HOSPITAL | Age: 64
Discharge: HOME | End: 2024-11-19
Attending: INTERNAL MEDICINE
Payer: MEDICARE

## 2024-11-19 VITALS — BODY MASS INDEX: 19.49 KG/M2 | WEIGHT: 110 LBS | HEIGHT: 63 IN

## 2024-11-19 DIAGNOSIS — R55 SYNCOPE AND COLLAPSE: ICD-10-CM

## 2024-11-19 PROCEDURE — 93660 TILT TABLE EVALUATION: CPT | Mod: HCNC

## 2025-02-05 DIAGNOSIS — R73.03 PREDIABETES: ICD-10-CM

## 2025-02-24 DIAGNOSIS — Z00.00 ENCOUNTER FOR MEDICARE ANNUAL WELLNESS EXAM: ICD-10-CM

## 2025-04-01 ENCOUNTER — HOSPITAL ENCOUNTER (OUTPATIENT)
Dept: RADIOLOGY | Facility: HOSPITAL | Age: 65
Discharge: HOME | End: 2025-04-01
Attending: INTERNAL MEDICINE
Payer: MEDICARE

## 2025-04-01 ENCOUNTER — RESULTS FOLLOW-UP (OUTPATIENT)
Dept: FAMILY MEDICINE | Facility: CLINIC | Age: 65
End: 2025-04-01

## 2025-04-01 VITALS — WEIGHT: 110 LBS | HEIGHT: 63 IN | BODY MASS INDEX: 19.49 KG/M2

## 2025-04-01 DIAGNOSIS — Z12.31 SCREENING MAMMOGRAM, ENCOUNTER FOR: ICD-10-CM

## 2025-04-01 PROCEDURE — 77063 BREAST TOMOSYNTHESIS BI: CPT | Mod: 26,,, | Performed by: RADIOLOGY

## 2025-04-01 PROCEDURE — 77067 SCR MAMMO BI INCL CAD: CPT | Mod: TC

## 2025-04-01 PROCEDURE — 77067 SCR MAMMO BI INCL CAD: CPT | Mod: 26,,, | Performed by: RADIOLOGY

## 2025-05-05 ENCOUNTER — PATIENT MESSAGE (OUTPATIENT)
Dept: FAMILY MEDICINE | Facility: CLINIC | Age: 65
End: 2025-05-05
Payer: MEDICARE

## 2025-05-07 ENCOUNTER — OFFICE VISIT (OUTPATIENT)
Dept: FAMILY MEDICINE | Facility: CLINIC | Age: 65
End: 2025-05-07
Payer: MEDICARE

## 2025-05-07 VITALS
DIASTOLIC BLOOD PRESSURE: 60 MMHG | BODY MASS INDEX: 19.14 KG/M2 | WEIGHT: 108 LBS | SYSTOLIC BLOOD PRESSURE: 125 MMHG | HEIGHT: 63 IN

## 2025-05-07 DIAGNOSIS — K57.90 DIVERTICULOSIS: ICD-10-CM

## 2025-05-07 DIAGNOSIS — E03.9 ACQUIRED HYPOTHYROIDISM: ICD-10-CM

## 2025-05-07 DIAGNOSIS — R55 SYNCOPE AND COLLAPSE: ICD-10-CM

## 2025-05-07 DIAGNOSIS — Z14.8 HEMOCHROMATOSIS CARRIER: ICD-10-CM

## 2025-05-07 DIAGNOSIS — Z80.0 FAMILY HISTORY OF COLON CANCER: ICD-10-CM

## 2025-05-07 DIAGNOSIS — E83.19 IRON EXCESS: ICD-10-CM

## 2025-05-07 DIAGNOSIS — E78.2 MIXED HYPERLIPIDEMIA: ICD-10-CM

## 2025-05-07 DIAGNOSIS — D18.09 HEMANGIOMA OF OTHER SITES: ICD-10-CM

## 2025-05-07 DIAGNOSIS — E51.9 VITAMIN B1 DEFICIENCY: ICD-10-CM

## 2025-05-07 DIAGNOSIS — D75.1 ERYTHROCYTOSIS: ICD-10-CM

## 2025-05-07 DIAGNOSIS — M48.07 SPINAL STENOSIS OF LUMBOSACRAL REGION: ICD-10-CM

## 2025-05-07 DIAGNOSIS — M85.89 OSTEOPENIA OF MULTIPLE SITES: ICD-10-CM

## 2025-05-07 DIAGNOSIS — D32.9 MENINGIOMA: ICD-10-CM

## 2025-05-07 DIAGNOSIS — Z00.00 ANNUAL PHYSICAL EXAM: Primary | ICD-10-CM

## 2025-05-07 DIAGNOSIS — K63.5 HYPERPLASTIC COLONIC POLYP, UNSPECIFIED PART OF COLON: ICD-10-CM

## 2025-05-07 DIAGNOSIS — R73.01 IFG (IMPAIRED FASTING GLUCOSE): ICD-10-CM

## 2025-05-07 DIAGNOSIS — Z98.84 HISTORY OF BARIATRIC SURGERY: ICD-10-CM

## 2025-05-07 DIAGNOSIS — R14.0 BLOATING: ICD-10-CM

## 2025-05-07 PROBLEM — K21.9 GASTROESOPHAGEAL REFLUX DISEASE WITHOUT ESOPHAGITIS: Status: ACTIVE | Noted: 2025-04-01

## 2025-05-07 PROBLEM — E53.8 VITAMIN B12 DEFICIENCY: Status: ACTIVE | Noted: 2025-04-01

## 2025-05-07 PROBLEM — E55.9 VITAMIN D DEFICIENCY: Status: ACTIVE | Noted: 2025-04-01

## 2025-05-07 PROCEDURE — 99999 PR PBB SHADOW E&M-EST. PATIENT-LVL V: CPT | Mod: PBBFAC,HCNC,, | Performed by: INTERNAL MEDICINE

## 2025-05-07 RX ORDER — VIT C/E/ZN/COPPR/LUTEIN/ZEAXAN 250MG-90MG
CAPSULE ORAL
Start: 2025-05-07

## 2025-05-07 RX ORDER — LEVOTHYROXINE SODIUM 88 UG/1
88 TABLET ORAL
Qty: 30 TABLET | Refills: 11 | Status: SHIPPED | OUTPATIENT
Start: 2025-05-07 | End: 2026-05-07

## 2025-05-08 NOTE — PROGRESS NOTES
" Patient ID: Ranjana Dominguez is a 64 y.o. female.    Chief Complaint: Annual Exam      Assessment:       1. Annual physical exam    2. Meningioma: seen on CT 1/24    3. Mixed hyperlipidemia    4. Diverticulosis: see colonoscopy 2021, also 2024    5. Hyperplastic colonic polyp, unspecified part of colon    6. Osteopenia of multiple sites: see DEXA 7/21; stable 10/23    7. Hemochromatosis carrier: heterozygous for H63D (10/23)    8. History of bariatric surgery:  Gastric sleeve- 2016    9. Spinal stenosis of lumbosacral region: see MRI 2023    10. Hemangioma of other sites- lumbar spine see MRI 2023    11. Syncope and collapse    12. Family history of colon cancer: father in his 60s    13. Vitamin B1 deficiency    14. Acquired hypothyroidism    15. Erythrocytosis    16. Iron excess    17. Bloating    18. IFG (impaired fasting glucose)       Plan:           1. Annual physical exam    2. Meningioma: seen on CT 1/24:  Repeat head CT for comparison    3. Mixed hyperlipidemia:  Mild hyperlipidemia with very high HDL.  Calculated ASCVD risk score is 3.9%.  Consider coronary CT, although extensive cardiac  workup in the last several years has been normal    4. Diverticulosis: see colonoscopy 2021, also 2024    5. Hyperplastic colonic polyp, unspecified part of colon- repeat in next 5 years given family history of colon cancer    6. Osteopenia of multiple sites: see DEXA 7/21; stable 10/23- exercise, fall prevention, anticipate recheck DEXA within the next 6-12 months  -     DXA Bone Density Axial Skeleton 1 or more sites; Future; Expected date: 05/07/2025    7. Hemochromatosis carrier: heterozygous for H63D (10/23)- needs iron studies  Overview:  Hematology e-consult 10/23:  "Do not suspect iron overload and no further work up is needed other than monitoring Iron panel as clinically indicated.      Contingency: Refer to clinic in case of Iron saturation over 45% and ferritin of over 1000 in which case she " "would need liver/cardiac MRI and liver biopsy and phlebotomy. "        8. History of bariatric surgery:  Gastric sleeve- 2016- needs additional blood work aside from what was done by her bariatric surgeon recently  -     Copper, Serum; Future; Expected date: 05/07/2025  -     Zinc; Future; Expected date: 05/07/2025    9. Spinal stenosis of lumbosacral region: see MRI 2023- stable, follows with outside physicians    10. Hemangioma of other sites- lumbar spine see MRI 2023- stable, follows with outside physicians    11. Syncope and collapse  -     CT Head Without Contrast; Future; Expected date: 05/07/2025    12. Family history of colon cancer: father in his 60s  -     Ambulatory referral/consult to Genetics; Future; Expected date: 05/14/2025    13. Vitamin B1 deficiency  -     Vitamin B1; Future; Expected date: 05/07/2025    14. Acquired hypothyroidism  -     TSH; Future; Expected date: 05/07/2025    15. Erythrocytosis  -     CBC Without Differential; Future; Expected date: 05/07/2025    16. Iron excess  -     Ferritin; Future; Expected date: 05/07/2025  -     Iron and TIBC; Future; Expected date: 05/07/2025    17. Bloating  -     US Abdomen Complete; Future; Expected date: 05/07/2025    18. IFG (impaired fasting glucose)  -     Hemoglobin A1C; Future; Expected date: 05/07/2025    Other orders  -     cholecalciferol, vitamin D3, 125 mcg (5,000 unit) capsule; Take 3 x weekly  -     levothyroxine (SYNTHROID) 88 MCG tablet; Take 1 tablet (88 mcg total) by mouth before breakfast.  Dispense: 30 tablet; Refill: 11       Labs and review  Consider COVID booster  DEXA scan later this year  Reduce Synthroid to 88 mcg and recheck labs in 3 months  Gallbladder issues reviewed, will obtain ultrasound for completeness  Subjective:   History of Present Illness    CHIEF COMPLAINT:  Patient presents today for annual exam.    BACK PAIN:  She reports ongoing back issues affecting L2 through L5. MRI from 2023 showed hemangioma, " significant degenerative changes, and severe spinal stenosis. She requires periodic injections for management. She remains cautious with activities due to fear of exacerbation, though participates in therapeutic workouts with gradual progression of range.  She is doing gentle exercise and range of motion and is under the care of other specialists for this.    CURRENT SYMPTOMS:  She reports itchy and flaky ears. She experiences frequent diarrhea with unpredictable timing, requiring early morning preparation to manage symptoms. She also reports intermittent bloating. She experiences palpitations with current thyroid medication dose.  (we discussed that her thyroid dose is too high and this needs to be adjusted.)    SLEEP:  She gets approximately 8 hours of sleep per night with history of insomnia and anxiety - mild- affecting sleep.    MEDICAL HISTORY:  She underwent sleeve gastrectomy in . She has a history of a fainting episode in  that prompted an extensive workup, including a tilt table test and 30-day monitor. During this evaluation, imaging incidentally found a small meningioma, possibly less than a cm. Bone density testing last year revealed osteopenia.    RECENT PROCEDURES:  She underwent bilateral cataract surgery in the past year with improvement in vision. Recent endoscopy showed normal biopsy results. Colonoscopy last year revealed a 4mm polyp.    FAMILY HISTORY:  Her father  at age 62 from colon cancer and had bipolar disorder. Her mother, a smoker, is  due to bladder sarcoma. Maternal grandmother had lymphoma.    CURRENT MEDICATIONS:  She takes Vitamin D 5000 units twice weekly, nightly Clonazepam for sleep/anxiety, Copper supplements, multivitamin without iron, and thyroid medication.      ROS:  General: -fever, -chills, -fatigue, -weight gain, -weight loss  Eyes: +vision changes, -redness, -discharge  ENT: -ear pain, -nasal congestion, -sore throat, +ear pruritus  Cardiovascular:  -chest pain, -palpitations, -lower extremity edema  Respiratory: -cough, -shortness of breath  Gastrointestinal: -abdominal pain, -nausea, -vomiting, +diarrhea, -constipation, -blood in stool, +bloating  Genitourinary: -dysuria, -hematuria, -frequency  Musculoskeletal: -joint pain, -muscle pain, +back pain  Skin: -rash, -lesion  Neurological: -headache, -dizziness, -numbness, -tingling, +sleep disturbances, +difficulty falling asleep, +difficulty staying asleep  Psychiatric: +anxiety, -depression, +sleep difficulty          Patient Active Problem List  Diagnosis    Anxiety    Glucose intolerance (impaired glucose tolerance)    Mixed hyperlipidemia    Acquired hypothyroidism    Family history of colon cancer: father in his 60s    History of bariatric surgery:  Gastric sleeve- 2016    Colon polyp, hyperplastic 2018; adenoma 2021 also 10/24 repeat 3 years    Osteopenia of multiple sites: see DEXA 7/21; stable 10/23    Diverticulosis: see colonoscopy 2021, also 2024    Lumbar radiculopathy    Copper deficiency    Iron excess    Hemochromatosis carrier: heterozygous for H63D (10/23)    Mild episode of recurrent major depressive disorder    Meningioma    Spinal stenosis of lumbosacral region: see MRI 2023    Hemangioma of other sites- lumbar spine see MRI 2023    Gastroesophageal reflux disease without esophagitis    Vitamin B12 deficiency    Vitamin D deficiency          Objective:      Physical Exam  Vitals and nursing note reviewed.   Constitutional:       Appearance: She is well-developed.   HENT:      Head: Normocephalic and atraumatic.      Right Ear: External ear normal.      Left Ear: External ear normal.      Nose: Nose normal.      Mouth/Throat:      Pharynx: No oropharyngeal exudate.   Eyes:      General: No scleral icterus.     Extraocular Movements: Extraocular movements intact.      Conjunctiva/sclera: Conjunctivae normal.   Neck:      Thyroid: No thyromegaly.      Vascular: No JVD.   Cardiovascular:       Rate and Rhythm: Normal rate and regular rhythm.      Heart sounds: Normal heart sounds. No murmur heard.     No gallop.   Pulmonary:      Effort: Pulmonary effort is normal. No respiratory distress.      Breath sounds: Normal breath sounds. No wheezing.   Abdominal:      General: Bowel sounds are normal. There is no distension.      Palpations: Abdomen is soft. There is no mass.      Tenderness: There is no abdominal tenderness. There is no guarding or rebound.   Musculoskeletal:         General: No tenderness. Normal range of motion.      Cervical back: Normal range of motion and neck supple.   Lymphadenopathy:      Cervical: No cervical adenopathy.   Skin:     General: Skin is warm.      Findings: No erythema or rash.   Neurological:      General: No focal deficit present.      Mental Status: She is alert and oriented to person, place, and time.      Cranial Nerves: No cranial nerve deficit.      Coordination: Coordination normal.   Psychiatric:         Behavior: Behavior normal.         Thought Content: Thought content normal.         Judgment: Judgment normal.             Health Maintenance Due   Topic Date Due    RSV Vaccine (Age 60+ and Pregnant patients) (1 - Risk 60-74 years 1-dose series) Never done    COVID-19 Vaccine (6 - 2024-25 season) 09/01/2024    Hemoglobin A1c (Prediabetes)  09/26/2024        This note was generated with the assistance of ambient listening technology. Verbal consent was obtained by the patient and accompanying visitor(s) for the recording of patient appointment to facilitate this note. I attest to having reviewed and edited the generated note for accuracy, though some syntax or spelling errors may persist. Please contact the author of this note for any clarification.         Answers submitted by the patient for this visit:  Review of Systems Questionnaire (Submitted on 4/30/2025)  activity change: No  unexpected weight change: No  hearing loss: No  trouble swallowing: No  eye  discharge: Yes  visual disturbance: No  chest tightness: No  wheezing: No  chest pain: No  palpitations: No  constipation: Yes  vomiting: No  diarrhea: Yes  polydipsia: No  polyuria: No  difficulty urinating: No  hematuria: No  headaches: No  dysphoric mood: No

## 2025-05-19 ENCOUNTER — TELEPHONE (OUTPATIENT)
Dept: HEMATOLOGY/ONCOLOGY | Facility: CLINIC | Age: 65
End: 2025-05-19
Payer: MEDICARE

## 2025-05-19 ENCOUNTER — HOSPITAL ENCOUNTER (OUTPATIENT)
Dept: RADIOLOGY | Facility: HOSPITAL | Age: 65
Discharge: HOME | End: 2025-05-19
Attending: INTERNAL MEDICINE
Payer: MEDICARE

## 2025-05-19 DIAGNOSIS — R14.0 BLOATING: ICD-10-CM

## 2025-05-19 PROCEDURE — 76700 US EXAM ABDOM COMPLETE: CPT | Mod: TC

## 2025-05-19 PROCEDURE — 76700 US EXAM ABDOM COMPLETE: CPT | Mod: 26,,, | Performed by: RADIOLOGY

## 2025-05-20 ENCOUNTER — RESULTS FOLLOW-UP (OUTPATIENT)
Dept: FAMILY MEDICINE | Facility: CLINIC | Age: 65
End: 2025-05-20
Payer: MEDICARE

## 2025-05-20 ENCOUNTER — PATIENT MESSAGE (OUTPATIENT)
Dept: FAMILY MEDICINE | Facility: CLINIC | Age: 65
End: 2025-05-20
Payer: COMMERCIAL

## 2025-05-20 ENCOUNTER — PATIENT MESSAGE (OUTPATIENT)
Dept: HEMATOLOGY/ONCOLOGY | Facility: CLINIC | Age: 65
End: 2025-05-20
Payer: COMMERCIAL

## 2025-05-20 DIAGNOSIS — K76.0 FATTY LIVER: Primary | ICD-10-CM

## 2025-05-21 ENCOUNTER — HOSPITAL ENCOUNTER (OUTPATIENT)
Dept: RADIOLOGY | Facility: HOSPITAL | Age: 65
Discharge: HOME | End: 2025-05-21
Attending: INTERNAL MEDICINE
Payer: MEDICARE

## 2025-05-21 DIAGNOSIS — R55 SYNCOPE AND COLLAPSE: ICD-10-CM

## 2025-05-21 PROCEDURE — 70450 CT HEAD/BRAIN W/O DYE: CPT | Mod: TC

## 2025-05-21 PROCEDURE — 70450 CT HEAD/BRAIN W/O DYE: CPT | Mod: 26,,, | Performed by: RADIOLOGY

## 2025-05-22 ENCOUNTER — TELEPHONE (OUTPATIENT)
Dept: HEMATOLOGY/ONCOLOGY | Facility: CLINIC | Age: 65
End: 2025-05-22
Payer: MEDICARE

## 2025-05-22 NOTE — TELEPHONE ENCOUNTER
Called the patient and left message to confirm the in office appointment on 5/26/25 and to complete the questionnaire.

## 2025-05-23 NOTE — PROGRESS NOTES
"Cancer Genetics  Department of Hematology and Oncology  [facility]    Date of Service:  25  Visit Provider:  Paul Haines DNP  Collaborating Physician:  Sanna Wesley MD    Patient ID  Name: Ranjana Dominguez    : 1960    MRN: [id number]      Referring Provider  Malena Pinto MD  [address]  [address]  [place] [id number]    Televisit Information  The patient location is: [place], [place]  The chief complaint leading to consultation is: As below    Visit type: audiovisual    Face to Face time with patient: 26 minutes  49 minutes of total time spent on the encounter, which includes face to face time and non-face to face time preparing to see the patient (eg, review of tests), Obtaining and/or reviewing separately obtained history, Documenting clinical information in the electronic or other health record, Independently interpreting results (not separately reported) and communicating results to the patient/family/caregiver, or Care coordination (not separately reported).         Each patient to whom he or she provides medical services by telemedicine is:  (1) informed of the relationship between the physician and patient and the respective role of any other health care provider with respect to management of the patient; and (2) notified that he or she may decline to receive medical services by telemedicine and may withdraw from such care at any time.    Notes:     SUBJECTIVE      Chief Complaint: Genetic Evaluation    History of Present Illness (HPI):  Ranjana Dominguez ("Ranjana"), 64 y.o., assigned female sex at birth, is new to the [facility] of [facility] and to me.  She was referred by Dr. Malena Pinto for cancer-genetic risk assessment given her family cancer history.    ONCOLOGY PEDIGREE  Other than noted, no known family history of cancer, benign tumor/mass/lesion, or colon " polyps.  If this pedigree appears small/illegible on your screen, expand this note window horizontally.      Personal Cancer-Related History  Cancer:  No   Colon polyp:  Yes  12/03/2009 colonoscopy at age 49 at [facility] with Dr. Shell:  No polyps  05/31/2012 colonoscopy at age 51 at [facility] with Dr. Shell:  No polyps  06/18/2015 colonoscopy at age 54 at [facility] with Dr. Shell:  No polyps  08/13/2018 colonoscopy at age 58 at [facility] with Dr. Shell:  (1) sessile 2-mm hyperplastic polyp removed from the rectum  11/17/2021 colonoscopy at age 61 at [facility] with Dr. Amin:  (1) sessile 3-mm tubular adenoma removed from the ascending colon  10/22/2024 colonoscopy at age 64 at [facility] with Dr. Amin:  (1) semi-pedunculated 4-mm tubular adenoma (negative for high-grade dysplasia) removed from the proximal rectum  Additional colonoscopies:  x2 with Dr. Chew and x2 with Dr. 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, heterozygous)]  Mammographic breast composition:  heterogeneously dense   Age at menarche:  8y  Age at first live childbirth:  25y  Menopausal status:  postmenopausal  Age at menopause:  53y  Hormone replacement therapy history:  patch only x9 years (stopped end of 2016)  Thoracic radiation therapy history:  never    Focused Surgical History  Reproductive organs:  Intact  Breast biopsies:  breast aspiration x1 with benign results per patient     Focused Social History  Tobacco Use: Medium Risk (5/7/2025)    Patient History     Smoking Tobacco Use: Former     Smokeless Tobacco Use: Never     Passive Exposure: Not on file     Former cigarette smoker - age 14 till 19, socially only 0.5ppd  TIMEFRAME NUMBER OF YEARS PACKS/DAY PACK-YEARS   Ages 15-20  5 0.5 2.5     Focused Family History  Ashkenazi Episcopal ancestry:  No  Consanguinity:  No  Family history of cancer-genetic testing:  No    Review of Systems  See HPI.       OBJECTIVE     Patient Active Problem List    Diagnosis Date Noted    Fatty liver: seen on u/s 5/25 05/20/2025    Spinal stenosis of lumbosacral region: see MRI 2023 05/07/2025    Hemangioma of other sites- lumbar spine see MRI 2023 05/07/2025    Gastroesophageal reflux disease without esophagitis 04/01/2025    Vitamin B12 deficiency 04/01/2025    Vitamin D deficiency 04/01/2025    Mild episode of recurrent major depressive disorder 01/12/2024    Meningioma: stable on CT 5/25 01/08/2024    Hemochromatosis carrier: heterozygous for H63D (10/23) 10/05/2023    Copper deficiency 10/02/2023    Iron excess 10/02/2023    Lumbar radiculopathy 10/25/2022    Diverticulosis: see colonoscopy 2021, also 2024 05/04/2022    Osteopenia of multiple sites: see DEXA 7/21; stable 10/23 07/28/2021    Colon polyp, hyperplastic 2018; adenoma 2021 also 10/24 repeat 3 years 08/14/2018    History of bariatric surgery:  Gastric sleeve- 2016 07/02/2018    Family history of colon cancer: father in his 60s 05/13/2016    Glucose intolerance (impaired glucose tolerance) 07/09/2012    Mixed hyperlipidemia 07/09/2012    Acquired hypothyroidism 07/09/2012    Anxiety      Past Medical History:   Diagnosis Date    Adenoma of ascending colon 11/17/2021    Adenoma of rectum 10/22/2024    Allergy     Anxiety     Bartholin cyst     Colon polyp, hyperplastic: see colonoscopy 8/18 08/14/2018    Depression     Family history of colon cancer: father in his 60s 05/13/2016    Fatty liver: seen on u/s 5/25 05/20/2025    Glucose intolerance (impaired glucose tolerance) 07/09/2012    Headache(784.0)     History of bariatric surgery 07/02/2018    Hypothyroidism     Lumbar radiculopathy 10/25/2022    Memory loss     Menopause syndrome     Mitral valve disorders(424.0) 05/30/2012    Obesity      "Other and unspecified hyperlipidemia     Sleep apnea: per sleep study June 2015; CPAP at 11 cm recommended 06/29/2015    Special screening for malignant neoplasms, colon 06/18/2015    Syncope 01/08/2024    Thyroid disease      Physical Exam  Significantly limited secondary to the inherent nature of a virtual visit.  Very pleasant patient.  Unaccompanied on this virtual visit.  Constitutional      Appearance:  Appears well developed and well nourished. No distress.   Neurological     Mental Status:  Alert and oriented.  Psychiatric         Mood and Affect:  Normal.     Thought Content:  Normal.     Speech:  Normal.     Behavior:  Normal.     Judgment:  Normal.    Breast Cancer Risk Assessment    None of the 3 above risk scores places Ranjana in a high-risk category for breast cancer risk.    ASSESSMENT / PLAN      Ranjana Dominguez ("Ranjana"), 64 y.o., assigned female sex at birth, is new to the [facility] and to me.  She was referred by Dr. Malena Pinto for cancer-genetic risk assessment given her family cancer history.    PRE-TEST GENETIC COUNSELING    Ranjana's clinical history is not strongly suggestive of a hereditary cancer predisposition syndrome; nonetheless, germline cancer-genetic testing can be considered for Ranjana based on her personal diagnosis of meningioma and mother's cancer history.      Cancer Genetics  Germline cancer-genetic testing is the testing of genes associated with cancer, known as cancer susceptibility genes.  Just as these genes are inherited from the parents--one copy of each gene from each parent--mutations in these genes can be inherited, as well.  A mutation in a cancer susceptibility gene adversely affects the gene's ability to prevent cancer; therefore, carriers of cancer susceptibility gene mutations may be at increased risk for certain cancers.     Causes of Cancer  Only approximately 5%-10% of cancers are caused by an " inherited cancer susceptibility gene mutation; rather, the majority of cancers are sporadic.  Causes of sporadic cancer may include environmental risk factors, lifestyle risk factors, and non-modifiable risk factors.  Family members often share not only genetics but also other risk factors, including environmental and lifestyle risk factors, so cancers can be familial.     Potential Results of Genetic Testing, and Their Implications  Potential results of genetic testing include positive, negative, and variant of unknown significance (VUS).    Positive:  A positive result indicates the presence of at least one clinically significant gene mutation, and the individual's associated cancer risks vary depending upon the cancer susceptibility gene(s) in which there is/are a mutation(s).  With a positive result, depending upon the specific result and the individual's clinical history, modified risk management may be recommended, including measures for risk reduction and/or surveillance; however, there are not always effective strategies for risk management.  There may be reproductive implications of a positive genetic test result, and there may be cancer-treatment implications of a positive genetic test result.  Negative:  A negative result indicates that no clinically significant mutations were identified in the gene(s) tested.  A negative result does not indicate that that individual cannot develop cancer, and, in fact, that individual may even be at increased risk for cancer based on shared risk factors with affected relatives.  The ability to interpret the meaning of a negative genetic testing result is limited in an individual unaffected with cancer whose affected relatives have not first undergone such testing.  The most informative family member to undergo testing for hereditary cancer syndromes first are those who have personally had cancer.    VUS:  A VUS is a genetic change for which there is not presently enough  data for the laboratory to make a determination as to whether the genetic variant is clinically significant.  VUSs are not typically acted upon clinically.       Genetic Mutation Inheritance  When an individual tests positive for a gene mutation, their first-degree relatives each have a 50% chance of carrying the same mutation, and other, more distant blood relatives may also be at risk of carrying the same mutation.      Genetic Discrimination  Genetic discrimination occurs when an individual is discriminated against on the basis of their genetic information.  The Genetic Information Nondiscrimination Act of 2008 (TAMERA) is [place] federal legislation that provides some protections against the use of an individual's genetic information by their health insurer and by their employer.  Title I of TAMERA prohibits most health insurers from utilizing an individual's genetic information to make decisions regarding insurance eligibility or premium charges; this protection does not apply to health insurance obtained through a job with the , and it may not apply to health insurance obtained through the Federal Employees Health Benefits Plan.  Title II of TAMERA prohibits covered entities, in many cases, from requesting or requiring the genetic information of employees and applicants and from using said information to make employment decisions; this protection does not apply to employers with fewer than 15 employees or to the .  TAMERA does not protect individuals from genetic discrimination by any other type of policy or entity, including but not limited to life insurance, disability insurance, long-term care insurance,  benefits, and  Health Services benefits.    Genetic Testing Logistics  An outside laboratory would perform the testing after a blood sample is collected at [facility].  The test is expected to take approximately three weeks to result.  The patient may incur out-of-pocket costs related to  genetic testing.  Post-test genetic counseling can be conducted once the genetic testing results are available.    INFORMATION TO FOLLOW YOUR CANCER-GENETICS VISIT    Health Maintenance / Cancer Risks and Risk Management    Only a small percentage (approximately 5%-10%) of cancers are hereditary, meaning caused by an inherited gene mutation; rather, most cancers are sporadic.  Environmental factors, lifestyle factors, and even factors beyond our control play a significant role in the development of many cancers.  Even if an individual has negative genetic testing, they can still be at increased risk for certain cancers, as they may independently have risk factors for those cancers and/or may share risk factors with family members affected by cancer.  It is strongly recommended that you ensure that your healthcare providers are aware of and up-to-date as to your personal and family history so that your cancer screenings can be based in part off of this information.      With regard to cancer risk reduction in general, it is recommended to avoid tobacco use; be physically active; maintain a healthy weight; eat a diet rich in fruits, vegetables, and whole grains and low in saturated/trans fat, red meat, and processed meat; limit alcohol consumption (zero is best); protect against sexually transmitted infections; protect against the sun, and avoid tanning beds; and get regular screenings for cancers as recommended by your healthcare team.    The below is general guidance regarding cancer screenings and is not to be considered exhaustive.  If you believe you need to see any of the below specialists, notify your cancer-genetics specialist promptly.     Cancer in general:  Attend an annual visit with a primary care provider (PCP) for history review, physical exam, and age-appropriate testing.  Additionally, beyond those cancer screenings listed herein, undergo screenings/surveillance as recommended by your healthcare  "providers.      Skin cancer:  Undergo total-body skin examination (TBSE) with a dermatology provider at least annually.    Colorectal cancer:  Based on your family history of colorectal cancer, colonoscopy is the recommended colorectal cancer (CRC) screening modality for you; repeat as recommended by your gastroenterology (GI) provider.  With regard to family history, advise your GI provider or cancer-genetics specialist if any of the following applies, as such could impact colorectal cancer screening recommendations for you:  (a) Any blood relative of yours has been diagnosed with colorectal cancer; (b) a 1st-degree relative of yours has a colorectal polyp history including any of the following characteristics:  adenoma with high-grade dysplasia, adenoma or sessile serrated polyp/adenoma 1 cm or larger in size, villous or tubulovillous adenoma, traditional serrated adenoma (TSA), sessile serrated lesion/polyp/adenoma with any dysplasia, cumulatively 10 or more polyps.    Breast cancer:  If you have never had breast cancer, there are tools that can be used to "calculate" your risk of breast cancer; ask your gynecology provider, breast healthcare specialist, or cancer-genetic specialist to calculate your breast cancer risks at least annually.  Undergo breast cancer risk-reduction counseling at least annually, undergo clinical breast examination (CBE) annually, and practice breast awareness, meaning being familiar with your breasts and promptly reporting any changes/concerns to your healthcare provider; starting at age 40, annual 3-D screening mammogram is recommended (shared decision-making is encouraged based on individuals' values and preferences).  Given that you have heterogeneously dense breast tissue on your most recent mammogram, be under the care of a breast healthcare specialist for age- and risk-appropriate breast cancer risk-reduction counseling, CBE, and screening imaging, including mammography and " potentially other modalities.      Gynecologic cancers:  Attend an annual visit with your gynecology provider, which may include pelvic exam and/or Pap smear/HPV testing.    Pancreatic cancer:  If you have at least two blood relatives (on the same side of the family) who have had pancreatic cancer, you may be a candidate for pancreatic cancer screening, which should be performed under the care of a pancreas specialist; notify your cancer-genetics specialist if this family history applies to you.    Lung cancer:  If you have a 20-pack-year smoking history or greater and are at least 50 years of age, shared patient/provider decision-making regarding low-dose CT scan (LDCT) for lung cancer screening is recommended; if you have at least a 20-pack-year smoking history or are unsure, discuss with your healthcare provider whether lung cancer screening is right for you.    Regarding Your Relatives    Your relatives also may be at increased risk for certain cancers, depending upon their own personal and family history; therefore, it is important that they, too, ensure that their own healthcare providers are aware of and up-to-date as to their personal and family history so that their medical management, including cancer screenings, can be based in part off of this information.      Additionally, it is possible for your relatives to have hereditary cancer susceptibility gene mutations in addition to and/or other than those identified in you (or if/when you have negative genetic testing).    Your relatives should speak with a healthcare provider about their cancer risks and whether genetic counseling and potentially testing may be indicated for them.  Anyone interested in pursuing cancer genetic counseling/testing may contact the [facility] at [phone] to schedule an appointment or may visit [email or web address] to locate a cancer-genetic specialist near them.    Follow-up    Please continue to follow up with  all healthcare providers as directed.    Moving forward, please update me with any changes to--or new information learned about--your personal or family history and with any relative's genetic testing results.  Barring any such changes, please follow up with me in 3 years for determination as to whether updated genetic testing is indicated for you at that time.  If at any point you wish to pursue hereditary cancer genetic testing, please reach out so I can facilitate that for you; otherwise, please follow up with me in 3 years to re-discuss the matter.  In the meantime, please don't hesitate to reach out with any questions or concerns, or follow up anytime sooner than planned as you wish.           DIAGNOSES AND ORDERS FOR THIS ENCOUNTER    ICD-10-CM ICD-9-CM   1. Encounter for nonprocreative genetic counseling  Z71.83 V26.33   2. Meningioma  D32.9 225.2   3. Family history of thyroid cancer  Z80.8 V16.8   4. Family history of bladder cancer  Z80.52 V16.52   5. Family history of colon cancer in father  Z80.0 V16.0   6. History of adenomatous polyp of colon  Z86.0101 V12.72   7. Hemochromatosis carrier  Z14.8 V83.89   8. Heterogeneously dense tissue of both breasts on mammography  R92.333 793.82     1. Encounter for nonprocreative genetic counseling  - Ranjana's clinical history is not strongly suggestive of a hereditary cancer predisposition syndrome; nonetheless, germline cancer-genetic testing can be considered for Ranjana based on her personal diagnosis of meningioma and mother's cancer history.  Pre-test genetic counseling was conducted.  Offered Ranjana options of proceeding with hereditary cancer susceptibility gene testing at this time versus delaying/declining such.  Ranjana elects to defer genetic testing for hereditary cancer syndromes at this time, citing wishing to discuss with her , and was advised to notify me if she desires to proceed in the future; she has my contact information.  For in  case Ranjana opts to proceed in the future, provided germline cancer genetic test options of customized panel versus broad panel.     2. Meningioma  - Ranjana is under the care of Dr. Malena Pinto    3. Family history of thyroid cancer  - See #1    4. Family history of bladder cancer  - Sarcoma -- See #1    5. Family history of colon cancer in father  - Ambulatory referral/consult to Genetics:  Completed  - Colonoscopy is recommended colorectal cancer screening modality for Ranjana; most recent was about 7 months ago; Ranjana is to repeat as recommended by her GI provider    6. History of adenomatous polyp of colon  - See #6    7. Hemochromatosis carrier  - Post-visit, offered Ranjana referral to General Genetics for genetic counseling - Will await reply    8. Heterogeneously dense tissue of both breasts on mammography  - Offered Ranjana referral to breast specialist - Ranjana declined      Follow-up:  Follow up in about 3 years (around 5/26/2028) for re-discussion regarding genetic testing.  Follow up sooner as needed/desired, such as if genetic testing is desired.    Questions were encouraged and answered to the patient's satisfaction, and she verbalized understanding of the information and agreement with the plan.  Advised Ranjana that pertinent information will be accessible in this visit note for her review.        Paul Haines, DNP, APRN, FNP-BC, AOCNP, CGRA  Nurse Practitioner, Cancer Genetics  Department of Hematology and Oncology  [facility]        CC:  Dr. Malena Pinto         Routed to Cancer Genetics Staff:  - Please place recall for 3 years.        Portions of the record may have been created with voice-recognition software.  Occasional wrong-word or sound-a-like substitutions may have occurred due to the inherent limitations of voice-recognition software.  Read the chart carefully, and recognize, using context,  where substitutions have occurred.

## 2025-05-26 ENCOUNTER — OFFICE VISIT (OUTPATIENT)
Dept: HEMATOLOGY/ONCOLOGY | Facility: CLINIC | Age: 65
End: 2025-05-26
Payer: COMMERCIAL

## 2025-05-26 DIAGNOSIS — D32.9 MENINGIOMA: ICD-10-CM

## 2025-05-26 DIAGNOSIS — R92.333 HETEROGENEOUSLY DENSE TISSUE OF BOTH BREASTS ON MAMMOGRAPHY: ICD-10-CM

## 2025-05-26 DIAGNOSIS — Z80.8 FAMILY HISTORY OF THYROID CANCER: ICD-10-CM

## 2025-05-26 DIAGNOSIS — Z71.83 ENCOUNTER FOR NONPROCREATIVE GENETIC COUNSELING: Primary | ICD-10-CM

## 2025-05-26 DIAGNOSIS — Z80.0 FAMILY HISTORY OF COLON CANCER IN FATHER: ICD-10-CM

## 2025-05-26 DIAGNOSIS — Z14.8 HEMOCHROMATOSIS CARRIER: ICD-10-CM

## 2025-05-26 DIAGNOSIS — Z86.0101 HISTORY OF ADENOMATOUS POLYP OF COLON: ICD-10-CM

## 2025-05-26 DIAGNOSIS — Z80.52 FAMILY HISTORY OF BLADDER CANCER: ICD-10-CM

## 2025-06-02 ENCOUNTER — TELEPHONE (OUTPATIENT)
Dept: FAMILY MEDICINE | Facility: CLINIC | Age: 65
End: 2025-06-02

## 2025-06-02 ENCOUNTER — OFFICE VISIT (OUTPATIENT)
Dept: FAMILY MEDICINE | Facility: CLINIC | Age: 65
End: 2025-06-02
Payer: MEDICARE

## 2025-06-02 DIAGNOSIS — G32.89: Primary | ICD-10-CM

## 2025-06-02 DIAGNOSIS — E83.19 IRON EXCESS: ICD-10-CM

## 2025-06-02 DIAGNOSIS — R79.89 HIGH SERUM PARATHYROID HORMONE (PTH): ICD-10-CM

## 2025-06-02 DIAGNOSIS — E53.8 VITAMIN B12 DEFICIENCY: ICD-10-CM

## 2025-06-02 DIAGNOSIS — E03.9 ACQUIRED HYPOTHYROIDISM: ICD-10-CM

## 2025-06-02 DIAGNOSIS — E56.9: Primary | ICD-10-CM

## 2025-06-02 DIAGNOSIS — E61.0 COPPER DEFICIENCY: ICD-10-CM

## 2025-06-02 DIAGNOSIS — R73.02 GLUCOSE INTOLERANCE (IMPAIRED GLUCOSE TOLERANCE): ICD-10-CM

## 2025-06-02 DIAGNOSIS — Z98.84 HISTORY OF BARIATRIC SURGERY: ICD-10-CM

## 2025-06-02 DIAGNOSIS — D32.9 MENINGIOMA: ICD-10-CM

## 2025-06-02 DIAGNOSIS — E55.9 VITAMIN D DEFICIENCY: ICD-10-CM

## 2025-06-02 DIAGNOSIS — I67.89 CEREBRAL MICROVASCULAR DISEASE: ICD-10-CM

## 2025-06-02 DIAGNOSIS — Z14.8 HEMOCHROMATOSIS CARRIER: ICD-10-CM

## 2025-06-09 ENCOUNTER — LAB VISIT (OUTPATIENT)
Dept: LAB | Facility: HOSPITAL | Age: 65
End: 2025-06-09
Attending: INTERNAL MEDICINE
Payer: MEDICARE

## 2025-06-09 DIAGNOSIS — E51.9 VITAMIN B1 DEFICIENCY: ICD-10-CM

## 2025-06-09 DIAGNOSIS — E56.9: ICD-10-CM

## 2025-06-09 DIAGNOSIS — D75.1 ERYTHROCYTOSIS: ICD-10-CM

## 2025-06-09 DIAGNOSIS — E03.9 ACQUIRED HYPOTHYROIDISM: ICD-10-CM

## 2025-06-09 DIAGNOSIS — Z98.84 HISTORY OF BARIATRIC SURGERY: ICD-10-CM

## 2025-06-09 DIAGNOSIS — I67.89 CEREBRAL MICROVASCULAR DISEASE: ICD-10-CM

## 2025-06-09 DIAGNOSIS — R73.01 IFG (IMPAIRED FASTING GLUCOSE): ICD-10-CM

## 2025-06-09 DIAGNOSIS — R79.89 HIGH SERUM PARATHYROID HORMONE (PTH): ICD-10-CM

## 2025-06-09 DIAGNOSIS — G32.89: ICD-10-CM

## 2025-06-09 DIAGNOSIS — E83.19 IRON EXCESS: ICD-10-CM

## 2025-06-09 LAB
25(OH)D3+25(OH)D2 SERPL-MCNC: 50 NG/ML (ref 30–96)
ALBUMIN SERPL BCP-MCNC: 4.2 G/DL (ref 3.5–5.2)
ALP SERPL-CCNC: 55 UNIT/L (ref 40–150)
ALT SERPL W/O P-5'-P-CCNC: 21 UNIT/L (ref 10–44)
ANION GAP (OHS): 11 MMOL/L (ref 8–16)
AST SERPL-CCNC: 25 UNIT/L (ref 11–45)
BILIRUB SERPL-MCNC: 0.5 MG/DL (ref 0.1–1)
BUN SERPL-MCNC: 13 MG/DL (ref 8–23)
CALCIUM SERPL-MCNC: 9.2 MG/DL (ref 8.7–10.5)
CHLORIDE SERPL-SCNC: 103 MMOL/L (ref 95–110)
CHOLEST SERPL-MCNC: 214 MG/DL (ref 120–199)
CHOLEST/HDLC SERPL: 2.8 {RATIO} (ref 2–5)
CO2 SERPL-SCNC: 28 MMOL/L (ref 23–29)
CREAT SERPL-MCNC: 0.8 MG/DL (ref 0.5–1.4)
EAG (OHS): 94 MG/DL (ref 68–131)
ERYTHROCYTE [DISTWIDTH] IN BLOOD BY AUTOMATED COUNT: 12 % (ref 11.5–14.5)
FERRITIN SERPL-MCNC: 157 NG/ML (ref 20–300)
GFR SERPLBLD CREATININE-BSD FMLA CKD-EPI: >60 ML/MIN/1.73/M2
GLUCOSE SERPL-MCNC: 82 MG/DL (ref 70–110)
HBA1C MFR BLD: 4.9 % (ref 4–5.6)
HCT VFR BLD AUTO: 43.8 % (ref 37–48.5)
HDLC SERPL-MCNC: 76 MG/DL (ref 40–75)
HDLC SERPL: 35.5 % (ref 20–50)
HGB BLD-MCNC: 14.4 GM/DL (ref 12–16)
IRON SATN MFR SERPL: 36 % (ref 20–50)
IRON SERPL-MCNC: 107 UG/DL (ref 30–160)
LDLC SERPL CALC-MCNC: 122 MG/DL (ref 63–159)
MCH RBC QN AUTO: 32.1 PG (ref 27–31)
MCHC RBC AUTO-ENTMCNC: 32.9 G/DL (ref 32–36)
MCV RBC AUTO: 98 FL (ref 82–98)
NONHDLC SERPL-MCNC: 138 MG/DL
PLATELET # BLD AUTO: 274 K/UL (ref 150–450)
PMV BLD AUTO: 10.9 FL (ref 9.2–12.9)
POTASSIUM SERPL-SCNC: 4.4 MMOL/L (ref 3.5–5.1)
PROT SERPL-MCNC: 6.3 GM/DL (ref 6–8.4)
PTH-INTACT SERPL-MCNC: 63.3 PG/ML (ref 9–77)
RBC # BLD AUTO: 4.49 M/UL (ref 4–5.4)
SODIUM SERPL-SCNC: 142 MMOL/L (ref 136–145)
TIBC SERPL-MCNC: 297 UG/DL (ref 250–450)
TRANSFERRIN SERPL-MCNC: 201 MG/DL (ref 200–375)
TRIGL SERPL-MCNC: 80 MG/DL (ref 30–150)
TSH SERPL-ACNC: 1.33 UIU/ML (ref 0.4–4)
VIT B12 SERPL-MCNC: 1041 PG/ML (ref 210–950)
WBC # BLD AUTO: 3.78 K/UL (ref 3.9–12.7)

## 2025-06-09 PROCEDURE — 84466 ASSAY OF TRANSFERRIN: CPT | Mod: HCNC

## 2025-06-09 PROCEDURE — 84207 ASSAY OF VITAMIN B-6: CPT | Mod: HCNC

## 2025-06-09 PROCEDURE — 80061 LIPID PANEL: CPT | Mod: HCNC

## 2025-06-09 PROCEDURE — 83036 HEMOGLOBIN GLYCOSYLATED A1C: CPT | Mod: HCNC

## 2025-06-09 PROCEDURE — 84630 ASSAY OF ZINC: CPT | Mod: HCNC

## 2025-06-09 PROCEDURE — 84443 ASSAY THYROID STIM HORMONE: CPT | Mod: HCNC

## 2025-06-09 PROCEDURE — 85027 COMPLETE CBC AUTOMATED: CPT | Mod: HCNC

## 2025-06-09 PROCEDURE — 84425 ASSAY OF VITAMIN B-1: CPT | Mod: HCNC

## 2025-06-09 PROCEDURE — 82728 ASSAY OF FERRITIN: CPT | Mod: HCNC

## 2025-06-09 PROCEDURE — 36415 COLL VENOUS BLD VENIPUNCTURE: CPT | Mod: HCNC,PO

## 2025-06-09 PROCEDURE — 82525 ASSAY OF COPPER: CPT | Mod: HCNC

## 2025-06-09 PROCEDURE — 82607 VITAMIN B-12: CPT | Mod: HCNC

## 2025-06-09 PROCEDURE — 83970 ASSAY OF PARATHORMONE: CPT | Mod: HCNC

## 2025-06-09 PROCEDURE — 82306 VITAMIN D 25 HYDROXY: CPT | Mod: HCNC

## 2025-06-09 PROCEDURE — 82247 BILIRUBIN TOTAL: CPT | Mod: HCNC

## 2025-06-12 LAB
W VITAMIN B1: 76 UG/L
W VITAMIN B6: 7 UG/L
W ZINC: 90 UG/DL

## 2025-06-13 LAB — W COPPER: 940 UG/L

## 2025-07-18 ENCOUNTER — PATIENT MESSAGE (OUTPATIENT)
Dept: HEMATOLOGY/ONCOLOGY | Facility: CLINIC | Age: 65
End: 2025-07-18
Payer: MEDICARE

## 2025-07-18 ENCOUNTER — TELEPHONE (OUTPATIENT)
Dept: HEMATOLOGY/ONCOLOGY | Facility: CLINIC | Age: 65
End: 2025-07-18
Payer: MEDICARE

## 2025-07-18 DIAGNOSIS — Z80.9 FAMILY HISTORY OF CANCER: ICD-10-CM

## 2025-07-18 DIAGNOSIS — D32.9 MENINGIOMA: Primary | ICD-10-CM

## 2025-07-18 NOTE — PROGRESS NOTES
ICD-10-CM ICD-9-CM   1. Meningioma  D32.9 225.2   2. Family history of cancer  Z80.9 V16.9     1. Meningioma  - [facility] - Hereditary Cancer with RNA; Future    2. Family history of cancer  - [facility] - Hereditary Cancer with RNA; Future

## 2025-07-18 NOTE — TELEPHONE ENCOUNTER
----- Message from Paul Haines DNP sent at 7/18/2025  1:39 PM CDT -----  Dotty, please contact pt to schedule [facility] xG+ blood work at [place].  Please schedule 30-min post-test virtual for approx. 4 weeks after sample collection date.  Thank you!

## 2025-07-18 NOTE — Clinical Note
Dotty, please contact pt to schedule [facility] xG+ blood work at [place].  Please schedule 30-min post-test virtual for approx. 4 weeks after sample collection date.  Thank you!

## 2025-07-21 ENCOUNTER — TELEPHONE (OUTPATIENT)
Dept: HEMATOLOGY/ONCOLOGY | Facility: CLINIC | Age: 65
End: 2025-07-21
Payer: MEDICARE

## 2025-07-21 NOTE — ED NOTES
Pt passed PO challenge and ambulated to bathroom with steady gait. Denies lightheadedness and dizziness.   Transitional Care Management Visit      Admission Date: 07/07/2025   Discharge Date: 07/11/2025 External Hospital  This visit is 8 days after discharge.    Lit Figueroa is a 89 year old here for Hospital F/U (7/7/25-7/11/25 at [facility] for SOB and leg swelling and pneumonia )    The discharge summary was reviewed. It documents that the patient was hospitalized for shortness of breath, leg swelling, pleural effusions, pneumonia. .  The patient is here today for transitional care management. She was admitted to the hospital and discharged on 07/11/2025 with increased swelling and shortness of breath. She was demonstrated to have bilateral pleural effusion. She did have a left-sided thoracentesis which was negative for malignancy. She was treated for possible pneumonia. BNP was actually normal. On echocardiogram, she had preserved ejection fraction with severely elevated pulmonary pressure. She had a fall on the left side prior to her admission. It was suspected she also had a type 2 non-STEMI with elevated troponin levels. She was discharged home with home nursing. She has been losing weight due to poor appetite. Weight today was 115 pounds. Blood pressure 104/64, pulse 81.    Her daughter has been monitoring her blood pressure and weight, noting a loss of 4 pounds. She expresses concern about the potential overuse of diuretics. She reports no current swelling but experiences shortness of breath during physical activity. Her appetite remains low, with occasional stomach discomfort. She has been supplementing her diet with Boost protein shakes. Her diet yesterday included oatmeal, banana, Greek yogurt, and a small amount of bean soup. She reports that food does not taste good to her anymore and she does not feel like eating. She has difficulty consuming meat but will eat yogurt and cottage cheese. She also enjoys peaches, squash, and sweet potatoes. The home health nurse noted some redness in her bottom, for  which a cream is applied twice daily. She is currently on furosemide twice daily and spironolactone once daily in the morning. Her fluid intake is low, resulting in infrequent urination. She experiences bowel and bladder incontinence, particularly in the evenings.    Diet: She has been supplementing her diet with Boost protein shakes. Her diet yesterday included oatmeal, banana, Greek yogurt, and a small amount of bean soup.    PAST SURGICAL HISTORY:  Left-sided thoracentesis    Medication list changes include: see above.  Pertinent hospital labs and tests: were reviewed.  Durable Medical Equipment/Assistive devices ordered: walker     Review of Systems  As documented above.    Advance care planning documents on file - yes    Objective   Vitals:    07/19/25 1110 07/19/25 1111   BP:  104/64   Pulse:  81   Resp:  16   Temp:  97.6 °F (36.4 °C)   TempSrc:  Temporal   SpO2:  96%   Weight - Patient Reported: 115 lb 6.4 oz (52.3 kg)      Physical Exam        Respiratory: Clear to auscultation, no wheezing, rales or rhonchi  Irreg + M   No edema   ASSESSMENT AND PLAN        1. Transitional care management.  - Admitted to the hospital and discharged on 07/11/2025 with increased swelling and shortness of breath. Demonstrated bilateral pleural effusion and underwent left-sided thoracentesis, negative for malignancy. Treated for possible pneumonia. BNP was normal, echocardiogram showed preserved ejection fraction with severely elevated pulmonary pressure. Suspected type 2 non-STEMI with elevated troponin levels.  - Discharged home with home nursing. Losing weight due to poor appetite. Weight today is 115 pounds. Blood pressure is 104/64, pulse is 81. Kidney function is normal, sodium levels slightly low, likely due to diuretic use.  - Dosage of furosemide reduced to once daily in the morning, spironolactone administered once daily in the morning. Prescription for mirtazapine provided to stimulate appetite.  - Advised to  continue consuming Boost protein shakes and eat appealing foods. If sudden weight gain of 5 pounds occurs, indicating fluid retention, diuretic dosage can be temporarily increased for 3 days.  1. RAMIREZ (dyspnea on exertion)  2. Edema, unspecified type  3. Pleural effusion  4. Pneumonia of both lungs due to infectious organism, unspecified part of lung  5. Pulmonary hypertension  (CMD)  6. Permanent atrial fibrillation  (CMD)  7. History of non-ST elevation myocardial infarction (NSTEMI)  8. Hyperlipidemia associated with type 2 diabetes mellitus  (CMD)  9. Sick sinus syndrome  (CMD)  10. Cardiac pacemaker in situ  11. Fall, sequela  12. Presence of Watchman left atrial appendage closure device  13. Type 2 diabetes mellitus with stage 3a chronic kidney disease, without long-term current use of insulin  (CMD)  14. Essential hypertension, benign  Other orders  -     mirtazapine (REMERON) 7.5 MG tablet; Take 1 tablet by mouth nightly.      Discharge medications were reviewed and updated with patient/family: fully compliant with the medication regimen prescribed at the time of discharge     Adherence to discharge treatment plan was assessed: fully adherent with the entire discharge treatment plan.         Future Appointments        JUL 21 2025   01:00 PM - Follow up (Arrived)  [facility] [place] [address] CNP           SEP 10    2025   11:30 AM - Office Visit  [facility] [place] [address] MD           DEC 11    2025   11:40 AM - Office Visit  [facility] [place] [address] Joni Aviles MD                Electronically signed by: Tawanna Aviles MD  7/21/2025

## 2025-07-24 ENCOUNTER — LAB VISIT (OUTPATIENT)
Dept: LAB | Facility: HOSPITAL | Age: 65
End: 2025-07-24
Payer: MEDICARE

## 2025-07-24 DIAGNOSIS — Z80.9 FAMILY HISTORY OF CANCER: ICD-10-CM

## 2025-07-24 DIAGNOSIS — D32.9 MENINGIOMA: ICD-10-CM

## 2025-07-24 PROCEDURE — 36415 COLL VENOUS BLD VENIPUNCTURE: CPT | Mod: HCNC

## 2025-07-30 ENCOUNTER — OFFICE VISIT (OUTPATIENT)
Facility: CLINIC | Age: 65
End: 2025-07-30
Payer: MEDICARE

## 2025-07-30 VITALS
WEIGHT: 112.63 LBS | SYSTOLIC BLOOD PRESSURE: 106 MMHG | HEART RATE: 76 BPM | HEIGHT: 63 IN | BODY MASS INDEX: 19.96 KG/M2 | DIASTOLIC BLOOD PRESSURE: 73 MMHG

## 2025-07-30 DIAGNOSIS — D32.9 MENINGIOMA: Primary | ICD-10-CM

## 2025-07-30 DIAGNOSIS — R20.9 SKIN SENSATION DISTURBANCE: ICD-10-CM

## 2025-07-30 DIAGNOSIS — I67.89 CEREBRAL MICROVASCULAR DISEASE: ICD-10-CM

## 2025-07-30 PROCEDURE — 99999 PR PBB SHADOW E&M-EST. PATIENT-LVL IV: CPT | Mod: PBBFAC,HCNC,, | Performed by: PSYCHIATRY & NEUROLOGY

## 2025-07-30 NOTE — PROGRESS NOTES
Subjective:       Patient ID: Ranjana Dominguez is a 65 y.o. female.    Chief Complaint: Consult    History  History of Present Illness    CHIEF COMPLAINT:  Patient presents today for follow up of abnormal head CT showing meningioma.    NEUROLOGIC:  She reports chronic balance issues persisting for approximately 13 years with significant difficulty characterized by frequent wobbling and falling. She experiences intermittent hand twitching for over a year, occurring primarily when holding objects like a phone or attempting precise tasks such as applying lip liner. She describes phantom hand sensations, predominantly in the left hand but occasionally in the right, occurring daily. Phantom sensations last 1-2 minutes and involve feeling phantom objects like toenail clippers, with complete resolution upon moving her physical hands. She denies family history of tremors. No associated pain or additional neurological symptoms reported.    MEDICAL HISTORY:  She has a history of syncope diagnosed in the past, currently asymptomatic with no recent fainting episodes. Brain MRI was performed in 2012 to evaluate potential multiple sclerosis and balance concerns.    SOCIAL HISTORY:  She has a remote history of smoking with no current tobacco use.       Past Medical History:   Diagnosis Date    Adenoma of ascending colon 11/17/2021    Adenoma of rectum 10/22/2024    Allergy     Anxiety     Bartholin cyst     Colon polyp, hyperplastic: see colonoscopy 8/18 08/14/2018    Depression     Family history of colon cancer: father in his 60s 05/13/2016    Fatty liver: seen on u/s 5/25 05/20/2025    Glucose intolerance (impaired glucose tolerance) 07/09/2012    Headache(784.0)     History of bariatric surgery 07/02/2018    Hypothyroidism     Lumbar radiculopathy 10/25/2022    Memory loss     Menopause syndrome     Mitral valve disorders(424.0) 05/30/2012    Obesity     Other and unspecified hyperlipidemia     Sleep apnea: per  sleep study 2015; CPAP at 11 cm recommended 2015    Special screening for malignant neoplasms, colon 2015    Syncope 2024    Thyroid disease       Current Medications[1]   Past Surgical History:   Procedure Laterality Date    APPENDECTOMY  2015    BREAST CYST ASPIRATION      CATARACT EXTRACTION Bilateral      SECTION, CLASSIC      COLONOSCOPY N/A 2018    Procedure: COLONOSCOPY;  Surgeon: Hansel Shell MD;  [facility]  Service: Endoscopy;  Laterality: N/A;    COLONOSCOPY N/A 2021    Procedure: COLONOSCOPY;  Surgeon: Martínez Amin MD;  Location: [facility] ([facility]);  Service: Endoscopy;  Laterality: N/A;  8/10 - LVM about cancelling procedure- sm  pt completed COVID vaccine- see Immunization record in chart-rb    COLONOSCOPY N/A 10/22/2024    Procedure: COLONOSCOPY;  Surgeon: Martínez Amin MD;  [facility]  Service: Endoscopy;  Laterality: N/A;  Ref by NP OMAR Miller, pt rquest Dr Amin, Suprep, portal - PC  10/15 attempted precall/prep instructions sent via portal-CD  10/18-lvm for precall-Kpvt  10/21-precall complete-Kpvt    EPIDURAL STEROID INJECTION INTO LUMBAR SPINE Bilateral 2023    Procedure: B/L L4-5 TFESI;  Surgeon: Woo Thomas MD;  Location: [facility] PAIN [facility]EMENT;  Service: Pain Management;  Laterality: Bilateral;  20 MINS    EPIDURAL STEROID INJECTION INTO LUMBAR SPINE Left 10/26/2023    Procedure: LT L2-3 TFESI;  Surgeon: Jorge Shipman DO;  Location: [facility] PAIN [facility];  Service: Pain Management;  Laterality: Left;  15 mins    FRACTURE SURGERY      HERNIA REPAIR      umbilical hernia    NOSE SURGERY      SLEEVE GASTROPLASTY      TRANSFORAMINAL EPIDURAL INJECTION OF STEROID Bilateral 2022    Procedure: TFESI (B/L) L4/5;  Surgeon: Jorge Shipman DO;  Location: [facility] OR;  Service: Pain Management;  Laterality: Bilateral;      Family History   Problem Relation Name Age of  "Onset    Sarcoma of bladder Mother Dinorah 64        tx: chemo, XRT    Thyroid cancer Mother Dinorah 35        subtype unknown (received liquid XRT as tx)    Smoking history Mother Dinorah     Sleep apnea Mother Dinorah     Colonic polyp Father Roberto     Sleep apnea Father Roberto     Depression Father Roberto         Bipolar    Colon cancer Father Roberto 62         w/in 5 weeks    Skin lesion Father Roberto         Father and his siblings with history of excisions of giant, painful, cyst-like lesions of scalp (father called them "talla lumps")    Colonic polyp Sister Christine     Suicide Sister Christine     Depression Sister Christine         suicide    Colonic polyp Brother Guanaco     Stroke Maternal Grandmother Tanesha         many    Heart disease Maternal Grandmother Tanesha     Cancer Maternal Grandmother Tanesha 74 - 79        lymphoma    Heart disease Paternal Grandmother      No Known Problems Daughter Arlen     Alzheimer's disease Other      Thyroid disease Other          "lots" in family    Melanoma Neg Hx      Skin cancer Neg Hx      Breast cancer Neg Hx      Ovarian cancer Neg Hx        Social History[2]   Review of Systems        Objective:      Vitals:    25 1522   BP: 106/73   Pulse: 76        NEUROLOGICAL EXAMINATION:     MENTAL STATUS   Attention: normal. Concentration: normal.   Speech: speech is normal   Level of consciousness: alert    CRANIAL NERVES   Cranial nerves II through XII intact.     MOTOR EXAM   Muscle bulk: normal  Overall muscle tone: normal    Strength   Strength 5/5 throughout.     SENSORY EXAM   Light touch normal.     GAIT AND COORDINATION     Gait  Gait: normal     Coordination   Finger to nose coordination: normal       High-frequency, low-amplitude tremor of left hand with posture and action. No rest tremor.     MRI of the brain from  was personally visualized. This shows a small thickening of the dura in the area of the calcification seen on recent CT of the head.    CT of the head from " 5/2025 was personally visualized. This shows a calcification in the right frontal convexity, consistent with calcified meningioma.     Assessment:       Problem List Items Addressed This Visit          Neuro    Cerebral microvascular disease: seen on CT 5/25       Oncology    Meningioma: stable on CT 5/25 - Primary    Relevant Orders    MRI Brain W WO Contrast    Creatinine, serum     Other Visit Diagnoses         Skin sensation disturbance                Plan:       Ranjana Dominguez is a 65 y.o. female with an abnormal CT head consistent with small calcified meningioma presenting for initial evaluation.    Meningioma  Reviewed CT showing small meningioma, likely benign and slow-growing based on comparison with 2012 MRI.  Explained meningioma as a small, generally benign tumor growing from the brain lining (meninges).  Ordered MRI Brain to assess meningioma growth rate, potential brain compression, and potential impact on brain.    OTHER DISTURBANCES OF SKIN SENSATION:  Ruled out parietal lobe seizure for phantom hand sensation due to bilateral occurrence.  Opted for watchful waiting approach regarding phantom hand sensation rather than immediate EEG or anti-seizure medication trial.    BRAIN VOLUME REDUCTION:  Noted mild brain volume reduction on CT, potentially age-related or due to past smoking history.  Patient to avoid alcohol and smoking to prevent further brain volume reduction.    FOLLOW-UP:  Follow up to discuss MRI results and further evaluate symptoms.               45 minutes of total time spent on the encounter, which includes face to face time and non-face to face time preparing to see the patient (eg, review of tests), Obtaining and/or reviewing separately obtained history, Documenting clinical information in the electronic or other health record, Independently interpreting results (not separately reported) and communicating results to the patient/family/caregiver, or Care coordination  (not separately reported).     Tahira Morelos MD  Department of Neurology  Neuro-Oncology Section           [1]   Current Outpatient Medications   Medication Sig Dispense Refill    BIOFREEZE, MENTHOL, TOP Apply topically daily as needed (Pain).      calcium carbonate (CALCIUM 500) 500 mg calcium (1,250 mg) chewable tablet Take 2 tablets by mouth every evening.      cetirizine (ZYRTEC) 10 MG tablet Take 10 mg by mouth once daily.      cholecalciferol, vitamin D3, 125 mcg (5,000 unit) capsule Take 3 x weekly      clonazePAM (KLONOPIN) 0.5 MG tablet Take 1 tablet (0.5 mg total) by mouth once daily. (Patient taking differently: Take 0.5 mg by mouth every evening.) 90 tablet 0    copper gluconate 2 mg Cap Take 2 mg by mouth once daily. 90 capsule 3    cyanocobalamin (VITAMIN B-12) 1000 MCG tablet Take 3 x weekly (Patient taking differently: Take 2 tablets by mouth once daily. Take 3 x weekly) 12 tablet 12    cycloSPORINE (RESTASIS) 0.05 % ophthalmic emulsion Place 1 drop into both eyes 2 (two) times daily.      lanolin/mineral oil/petrolatum (ARTIFICIAL TEARS OPHT) Place 2 drops into both eyes daily as needed (Dry eyes).      levothyroxine (SYNTHROID) 88 MCG tablet Take 1 tablet (88 mcg total) by mouth before breakfast. 30 tablet 11    multivit-min/iron/folic acid/K (BARIATRIC MULTIVITAMINS ORAL) Take 1 tablet by mouth once daily.      mupirocin (BACTROBAN) 2 % ointment Apply topically 3 (three) times daily. (Patient taking differently: Apply topically as needed.) 30 g 0    naproxen (NAPROSYN) 500 MG tablet Take 500 mg by mouth 2 (two) times daily as needed (Pain).      UNABLE TO FIND Medical Marijuana - 3 puffs by mouth daily as needed for pain.      venlafaxine (EFFEXOR) 50 MG Tab TK 1 T PO   tablet 3     No current facility-administered medications for this visit.     Facility-Administered Medications Ordered in Other Visits   Medication Dose Route Frequency Provider Last Rate Last Admin    dextrose 5 %  and 0.45 % NaCl infusion   Intravenous Continuous Hansel Shell MD   New Bag at 18 0843    dextrose 5 % and 0.45 % NaCl infusion   Intravenous Continuous Hansel Shell MD 20 mL/hr at 18 0755 New Bag at 18 0755    sodium chloride 0.9% flush 3 mL  3 mL Intravenous PRN Hansel Shell MD       [2]   Social History  Tobacco Use    Smoking status: Former     Current packs/day: 0.00     Average packs/day: 0.5 packs/day for 5.0 years (2.5 ttl pk-yrs)     Types: Cigarettes     Start date: 8/15/1974     Quit date: 8/15/1979     Years since quittin.9    Smokeless tobacco: Never   Substance Use Topics    Alcohol use: Yes     Alcohol/week: 0.0 standard drinks of alcohol     Types: 2 - 4 Glasses of wine per week     Comment: weekends    Drug use: No

## 2025-08-08 ENCOUNTER — PATIENT MESSAGE (OUTPATIENT)
Dept: HEMATOLOGY/ONCOLOGY | Facility: CLINIC | Age: 65
End: 2025-08-08
Payer: MEDICARE

## 2025-09-03 ENCOUNTER — OFFICE VISIT (OUTPATIENT)
Facility: CLINIC | Age: 65
End: 2025-09-03
Payer: MEDICARE

## 2025-09-03 ENCOUNTER — HOSPITAL ENCOUNTER (OUTPATIENT)
Dept: RADIOLOGY | Facility: HOSPITAL | Age: 65
Discharge: HOME | End: 2025-09-03
Attending: PSYCHIATRY & NEUROLOGY
Payer: MEDICARE

## 2025-09-03 VITALS
DIASTOLIC BLOOD PRESSURE: 79 MMHG | WEIGHT: 111.44 LBS | BODY MASS INDEX: 19.75 KG/M2 | SYSTOLIC BLOOD PRESSURE: 123 MMHG | HEART RATE: 67 BPM | HEIGHT: 63 IN

## 2025-09-03 DIAGNOSIS — D32.9 MENINGIOMA: Primary | ICD-10-CM

## 2025-09-03 DIAGNOSIS — R90.82 WHITE MATTER DISEASE: ICD-10-CM

## 2025-09-03 DIAGNOSIS — D32.9 MENINGIOMA: ICD-10-CM

## 2025-09-03 PROCEDURE — 3008F BODY MASS INDEX DOCD: CPT | Mod: CPTII,HCNC,S$GLB, | Performed by: PSYCHIATRY & NEUROLOGY

## 2025-09-03 PROCEDURE — 3044F HG A1C LEVEL LT 7.0%: CPT | Mod: CPTII,HCNC,S$GLB, | Performed by: PSYCHIATRY & NEUROLOGY

## 2025-09-03 PROCEDURE — 3078F DIAST BP <80 MM HG: CPT | Mod: CPTII,HCNC,S$GLB, | Performed by: PSYCHIATRY & NEUROLOGY

## 2025-09-03 PROCEDURE — 3074F SYST BP LT 130 MM HG: CPT | Mod: CPTII,HCNC,S$GLB, | Performed by: PSYCHIATRY & NEUROLOGY

## 2025-09-03 PROCEDURE — 70553 MRI BRAIN STEM W/O & W/DYE: CPT | Mod: 26,HCNC,, | Performed by: RADIOLOGY

## 2025-09-03 PROCEDURE — 1101F PT FALLS ASSESS-DOCD LE1/YR: CPT | Mod: CPTII,HCNC,S$GLB, | Performed by: PSYCHIATRY & NEUROLOGY

## 2025-09-03 PROCEDURE — A9585 GADOBUTROL INJECTION: HCPCS | Mod: HCNC | Performed by: PSYCHIATRY & NEUROLOGY

## 2025-09-03 PROCEDURE — G2211 COMPLEX E/M VISIT ADD ON: HCPCS | Mod: HCNC,S$GLB,, | Performed by: PSYCHIATRY & NEUROLOGY

## 2025-09-03 PROCEDURE — 25500020 PHARM REV CODE 255: Mod: HCNC | Performed by: PSYCHIATRY & NEUROLOGY

## 2025-09-03 PROCEDURE — 99999 PR PBB SHADOW E&M-EST. PATIENT-LVL III: CPT | Mod: PBBFAC,HCNC,, | Performed by: PSYCHIATRY & NEUROLOGY

## 2025-09-03 PROCEDURE — 70553 MRI BRAIN STEM W/O & W/DYE: CPT | Mod: TC,HCNC

## 2025-09-03 PROCEDURE — 3288F FALL RISK ASSESSMENT DOCD: CPT | Mod: CPTII,HCNC,S$GLB, | Performed by: PSYCHIATRY & NEUROLOGY

## 2025-09-03 PROCEDURE — 99215 OFFICE O/P EST HI 40 MIN: CPT | Mod: HCNC,S$GLB,, | Performed by: PSYCHIATRY & NEUROLOGY

## 2025-09-03 PROCEDURE — 1159F MED LIST DOCD IN RCRD: CPT | Mod: CPTII,HCNC,S$GLB, | Performed by: PSYCHIATRY & NEUROLOGY

## 2025-09-03 PROCEDURE — 1126F AMNT PAIN NOTED NONE PRSNT: CPT | Mod: CPTII,HCNC,S$GLB, | Performed by: PSYCHIATRY & NEUROLOGY

## 2025-09-03 RX ORDER — GADOBUTROL 604.72 MG/ML
6 INJECTION INTRAVENOUS
Status: COMPLETED | OUTPATIENT
Start: 2025-09-03 | End: 2025-09-03

## 2025-09-03 RX ADMIN — GADOBUTROL 6 ML: 604.72 INJECTION INTRAVENOUS at 08:09

## 2025-09-04 ENCOUNTER — TELEPHONE (OUTPATIENT)
Dept: HEMATOLOGY/ONCOLOGY | Facility: CLINIC | Age: 65
End: 2025-09-04
Payer: MEDICARE

## (undated) DEVICE — BANDAGE ADHESIVE

## (undated) DEVICE — COVER PROXIMA MAYO STAND

## (undated) DEVICE — CHLORAPREP 10.5 ML APPLICATOR

## (undated) DEVICE — KIT NERVE BLOCK PREP BAPTIST

## (undated) DEVICE — GLOVE BIOGEL SKINSENSE PI 7.5

## (undated) DEVICE — TOWEL OR DISP STRL BLUE 4/PK

## (undated) DEVICE — MARKER SKIN STND TIP BLUE BARR